# Patient Record
Sex: MALE | Race: BLACK OR AFRICAN AMERICAN | URBAN - METROPOLITAN AREA
[De-identification: names, ages, dates, MRNs, and addresses within clinical notes are randomized per-mention and may not be internally consistent; named-entity substitution may affect disease eponyms.]

---

## 2017-02-17 ENCOUNTER — DOCTOR'S OFFICE (OUTPATIENT)
Dept: URBAN - METROPOLITAN AREA CLINIC 127 | Facility: CLINIC | Age: 47
Setting detail: OPHTHALMOLOGY
End: 2017-02-17
Payer: COMMERCIAL

## 2017-02-17 ENCOUNTER — RX ONLY (RX ONLY)
Age: 47
End: 2017-02-17

## 2017-02-17 DIAGNOSIS — H50.15: ICD-10-CM

## 2017-02-17 DIAGNOSIS — E11.9: ICD-10-CM

## 2017-02-17 DIAGNOSIS — H04.123: ICD-10-CM

## 2017-02-17 DIAGNOSIS — H01.001: ICD-10-CM

## 2017-02-17 DIAGNOSIS — H40.1232: ICD-10-CM

## 2017-02-17 PROCEDURE — 65855 TRABECULOPLASTY LASER SURG: CPT | Performed by: OPHTHALMOLOGY

## 2017-02-17 PROCEDURE — 92133 CPTRZD OPH DX IMG PST SGM ON: CPT | Performed by: OPHTHALMOLOGY

## 2017-02-17 PROCEDURE — 92014 COMPRE OPH EXAM EST PT 1/>: CPT | Performed by: OPHTHALMOLOGY

## 2017-02-17 PROCEDURE — 92083 EXTENDED VISUAL FIELD XM: CPT | Performed by: OPHTHALMOLOGY

## 2017-02-17 PROCEDURE — 92020 GONIOSCOPY: CPT | Performed by: OPHTHALMOLOGY

## 2017-02-17 ASSESSMENT — REFRACTION_MANIFEST
OD_VA1: 20/
OD_VA3: 20/
OS_VA1: 20/
OU_VA: 20/
OD_VA2: 20/
OD_VA2: 20/
OS_VA1: 20/
OD_VA1: 20/
OS_VA2: 20/
OD_VA3: 20/
OS_VA1: 20/
OU_VA: 20/
OS_VA3: 20/
OU_VA: 20/
OS_VA3: 20/
OD_VA3: 20/
OS_VA2: 20/
OD_VA2: 20/
OS_VA3: 20/
OS_VA2: 20/
OD_VA1: 20/

## 2017-02-17 ASSESSMENT — SUPERFICIAL PUNCTATE KERATITIS (SPK)
OD_SPK: 1+
OS_SPK: 1+

## 2017-02-17 ASSESSMENT — REFRACTION_CURRENTRX
OS_AXIS: 93
OS_OVR_VA: 20/
OS_OVR_VA: 20/
OS_SPHERE: +1.25
OD_OVR_VA: 20/
OD_SPHERE: +0.75
OS_OVR_VA: 20/
OD_OVR_VA: 20/
OD_AXIS: 175
OD_CYLINDER: +0.75
OD_ADD: +1.50
OS_CYLINDER: +0.75
OS_ADD: +1.50
OD_OVR_VA: 20/

## 2017-02-17 ASSESSMENT — TEAR BREAK UP TIME (TBUT)
OS_TBUT: 1+
OD_TBUT: 1+

## 2017-02-17 ASSESSMENT — CONFRONTATIONAL VISUAL FIELD TEST (CVF)
OS_FINDINGS: FULL
OD_FINDINGS: FULL

## 2017-02-17 ASSESSMENT — LID EXAM ASSESSMENTS
OS_BLEPHARITIS: 3+
OD_BLEPHARITIS: 3+

## 2017-02-17 ASSESSMENT — VISUAL ACUITY
OS_BCVA: 20/30
OD_BCVA: 20/30

## 2017-04-14 ENCOUNTER — RX ONLY (RX ONLY)
Age: 47
End: 2017-04-14

## 2017-04-14 ENCOUNTER — DOCTOR'S OFFICE (OUTPATIENT)
Dept: URBAN - METROPOLITAN AREA CLINIC 127 | Facility: CLINIC | Age: 47
Setting detail: OPHTHALMOLOGY
End: 2017-04-14
Payer: COMMERCIAL

## 2017-04-14 DIAGNOSIS — E11.9: ICD-10-CM

## 2017-04-14 DIAGNOSIS — H50.15: ICD-10-CM

## 2017-04-14 DIAGNOSIS — H01.001: ICD-10-CM

## 2017-04-14 DIAGNOSIS — H40.1232: ICD-10-CM

## 2017-04-14 DIAGNOSIS — H04.123: ICD-10-CM

## 2017-04-14 PROCEDURE — 92250 FUNDUS PHOTOGRAPHY W/I&R: CPT | Performed by: OPHTHALMOLOGY

## 2017-04-14 PROCEDURE — 65855 TRABECULOPLASTY LASER SURG: CPT | Performed by: OPHTHALMOLOGY

## 2017-04-14 PROCEDURE — 92014 COMPRE OPH EXAM EST PT 1/>: CPT | Performed by: OPHTHALMOLOGY

## 2017-04-14 ASSESSMENT — REFRACTION_MANIFEST
OS_VA1: 20/
OS_VA2: 20/
OU_VA: 20/
OD_VA2: 20/
OD_VA1: 20/
OD_VA3: 20/
OU_VA: 20/
OS_VA3: 20/
OD_VA3: 20/
OS_VA1: 20/
OD_VA2: 20/
OS_VA3: 20/
OS_VA2: 20/
OD_VA1: 20/

## 2017-04-14 ASSESSMENT — REFRACTION_CURRENTRX
OD_OVR_VA: 20/
OS_ADD: +1.50
OD_SPHERE: +0.75
OS_OVR_VA: 20/
OS_OVR_VA: 20/
OS_SPHERE: +1.25
OS_AXIS: 93
OD_AXIS: 175
OS_CYLINDER: +0.75
OD_CYLINDER: +0.75
OD_OVR_VA: 20/
OS_OVR_VA: 20/
OD_OVR_VA: 20/
OD_ADD: +1.50

## 2017-04-14 ASSESSMENT — LID EXAM ASSESSMENTS
OD_BLEPHARITIS: 3+
OS_BLEPHARITIS: 3+

## 2017-04-14 ASSESSMENT — REFRACTION_OUTSIDERX
OS_CYLINDER: +0.75
OD_VA1: 20/20
OD_ADD: +2.00
OS_VA2: 20/
OU_VA: 20/
OS_SPHERE: +1.00
OS_ADD: +2.00
OD_VA3: 20/
OS_VA1: 20/25
OD_VA2: 20/
OD_SPHERE: +0.50
OD_AXIS: 180
OS_VA3: 20/
OD_CYLINDER: +0.25
OS_AXIS: 105

## 2017-04-14 ASSESSMENT — VISUAL ACUITY
OS_BCVA: 20/30
OD_BCVA: 20/30

## 2017-04-14 ASSESSMENT — SUPERFICIAL PUNCTATE KERATITIS (SPK)
OS_SPK: 1+
OD_SPK: 1+

## 2017-04-14 ASSESSMENT — TEAR BREAK UP TIME (TBUT)
OD_TBUT: 1+
OS_TBUT: 1+

## 2017-04-14 ASSESSMENT — CONFRONTATIONAL VISUAL FIELD TEST (CVF)
OS_FINDINGS: FULL
OD_FINDINGS: FULL

## 2017-08-11 ENCOUNTER — DOCTOR'S OFFICE (OUTPATIENT)
Dept: URBAN - METROPOLITAN AREA CLINIC 127 | Facility: CLINIC | Age: 47
Setting detail: OPHTHALMOLOGY
End: 2017-08-11
Payer: COMMERCIAL

## 2017-08-11 DIAGNOSIS — H01.001: ICD-10-CM

## 2017-08-11 DIAGNOSIS — H50.15: ICD-10-CM

## 2017-08-11 DIAGNOSIS — H40.1232: ICD-10-CM

## 2017-08-11 DIAGNOSIS — E11.9: ICD-10-CM

## 2017-08-11 DIAGNOSIS — D23.11: ICD-10-CM

## 2017-08-11 PROCEDURE — 67840 REMOVE EYELID LESION: CPT | Performed by: OPHTHALMOLOGY

## 2017-08-11 PROCEDURE — 92014 COMPRE OPH EXAM EST PT 1/>: CPT | Performed by: OPHTHALMOLOGY

## 2017-08-11 ASSESSMENT — CONFRONTATIONAL VISUAL FIELD TEST (CVF)
OS_FINDINGS: FULL
OD_FINDINGS: FULL

## 2017-08-11 ASSESSMENT — VISUAL ACUITY
OD_BCVA: 20/30
OS_BCVA: 20/30

## 2017-08-11 ASSESSMENT — SUPERFICIAL PUNCTATE KERATITIS (SPK)
OS_SPK: 1+
OD_SPK: 1+

## 2017-08-11 ASSESSMENT — REFRACTION_MANIFEST
OD_VA2: 20/
OS_VA3: 20/
OS_VA2: 20/
OD_VA2: 20/
OS_VA1: 20/
OU_VA: 20/
OS_VA1: 20/
OD_VA1: 20/
OD_VA1: 20/
OD_VA3: 20/
OS_VA3: 20/
OU_VA: 20/
OD_VA3: 20/
OS_VA2: 20/

## 2017-08-11 ASSESSMENT — REFRACTION_OUTSIDERX
OS_CYLINDER: +0.75
OD_SPHERE: +0.50
OS_VA1: 20/25
OD_VA1: 20/20
OU_VA: 20/
OS_AXIS: 105
OS_VA3: 20/
OS_ADD: +2.00
OD_ADD: +2.00
OD_VA2: 20/
OD_AXIS: 180
OS_SPHERE: +1.00
OD_VA3: 20/
OD_CYLINDER: +0.25
OS_VA2: 20/

## 2017-08-11 ASSESSMENT — REFRACTION_CURRENTRX
OS_OVR_VA: 20/
OS_OVR_VA: 20/
OD_SPHERE: +0.75
OD_OVR_VA: 20/
OD_AXIS: 175
OS_ADD: +1.50
OS_CYLINDER: +0.75
OD_OVR_VA: 20/
OD_OVR_VA: 20/
OS_AXIS: 93
OS_OVR_VA: 20/
OS_SPHERE: +1.25
OD_ADD: +1.50
OD_CYLINDER: +0.75

## 2017-08-11 ASSESSMENT — LID EXAM ASSESSMENTS
OS_BLEPHARITIS: 3+
OD_BLEPHARITIS: 3+

## 2017-08-11 ASSESSMENT — TEAR BREAK UP TIME (TBUT)
OD_TBUT: 1+
OS_TBUT: 1+

## 2017-12-15 ENCOUNTER — DOCTOR'S OFFICE (OUTPATIENT)
Dept: URBAN - METROPOLITAN AREA CLINIC 127 | Facility: CLINIC | Age: 47
Setting detail: OPHTHALMOLOGY
End: 2017-12-15
Payer: COMMERCIAL

## 2017-12-15 DIAGNOSIS — H04.123: ICD-10-CM

## 2017-12-15 DIAGNOSIS — H50.15: ICD-10-CM

## 2017-12-15 DIAGNOSIS — H01.001: ICD-10-CM

## 2017-12-15 DIAGNOSIS — H40.1232: ICD-10-CM

## 2017-12-15 DIAGNOSIS — E11.9: ICD-10-CM

## 2017-12-15 DIAGNOSIS — D31.11: ICD-10-CM

## 2017-12-15 PROCEDURE — 92014 COMPRE OPH EXAM EST PT 1/>: CPT | Performed by: OPHTHALMOLOGY

## 2017-12-15 PROCEDURE — 65450 TREATMENT OF CORNEAL LESION: CPT | Performed by: OPHTHALMOLOGY

## 2017-12-15 ASSESSMENT — LID EXAM ASSESSMENTS
OS_BLEPHARITIS: 3+
OD_BLEPHARITIS: 3+

## 2017-12-15 ASSESSMENT — REFRACTION_CURRENTRX
OD_CYLINDER: +0.75
OS_OVR_VA: 20/
OD_SPHERE: +0.75
OS_OVR_VA: 20/
OS_SPHERE: +1.25
OS_CYLINDER: +0.75
OD_AXIS: 175
OS_OVR_VA: 20/
OD_OVR_VA: 20/
OD_ADD: +1.50
OS_ADD: +1.50
OD_OVR_VA: 20/
OD_OVR_VA: 20/
OS_AXIS: 93

## 2017-12-15 ASSESSMENT — REFRACTION_MANIFEST
OD_SPHERE: +2.50
OD_VA1: 20/
OS_VA1: 20/
OD_VA2: 20/
OD_VA2: 20/
OS_VA3: 20/
OS_VA2: 20/
OS_VA2: 20/
OS_SPHERE: +2.50
OD_VA3: 20/
OU_VA: 20/
OS_VA1: 20/
OU_VA: 20/
OD_VA3: 20/
OS_VA3: 20/
OD_VA1: 20/

## 2017-12-15 ASSESSMENT — REFRACTION_OUTSIDERX
OD_VA1: 20/20
OD_CYLINDER: +0.25
OS_VA2: 20/
OD_AXIS: 180
OS_CYLINDER: +0.75
OS_VA1: 20/25
OS_SPHERE: +1.00
OU_VA: 20/
OD_SPHERE: +0.50
OS_VA3: 20/
OD_VA2: 20/
OD_ADD: +2.00
OS_ADD: +2.00
OD_VA3: 20/
OS_AXIS: 105

## 2017-12-15 ASSESSMENT — CONFRONTATIONAL VISUAL FIELD TEST (CVF)
OD_FINDINGS: FULL
OS_FINDINGS: FULL

## 2017-12-15 ASSESSMENT — VISUAL ACUITY
OD_BCVA: 20/20
OS_BCVA: 20/20

## 2017-12-15 ASSESSMENT — SUPERFICIAL PUNCTATE KERATITIS (SPK)
OD_SPK: 1+
OS_SPK: 1+

## 2017-12-15 ASSESSMENT — TEAR BREAK UP TIME (TBUT)
OD_TBUT: 1+
OS_TBUT: 1+

## 2018-04-13 ENCOUNTER — DOCTOR'S OFFICE (OUTPATIENT)
Dept: URBAN - METROPOLITAN AREA CLINIC 127 | Facility: CLINIC | Age: 48
Setting detail: OPHTHALMOLOGY
End: 2018-04-13
Payer: COMMERCIAL

## 2018-04-13 DIAGNOSIS — E11.9: ICD-10-CM

## 2018-04-13 DIAGNOSIS — H04.123: ICD-10-CM

## 2018-04-13 DIAGNOSIS — H50.15: ICD-10-CM

## 2018-04-13 DIAGNOSIS — H40.1232: ICD-10-CM

## 2018-04-13 DIAGNOSIS — H01.001: ICD-10-CM

## 2018-04-13 PROCEDURE — 92014 COMPRE OPH EXAM EST PT 1/>: CPT | Performed by: OPHTHALMOLOGY

## 2018-04-13 PROCEDURE — 65855 TRABECULOPLASTY LASER SURG: CPT | Performed by: OPHTHALMOLOGY

## 2018-04-13 PROCEDURE — 92133 CPTRZD OPH DX IMG PST SGM ON: CPT | Performed by: OPHTHALMOLOGY

## 2018-04-13 PROCEDURE — 92083 EXTENDED VISUAL FIELD XM: CPT | Performed by: OPHTHALMOLOGY

## 2018-04-13 ASSESSMENT — REFRACTION_OUTSIDERX
OD_CYLINDER: +0.25
OD_SPHERE: +0.50
OS_SPHERE: +1.00
OS_VA3: 20/
OS_VA2: 20/
OD_VA3: 20/
OS_VA1: 20/25
OS_AXIS: 105
OS_ADD: +2.00
OS_CYLINDER: +0.75
OU_VA: 20/
OD_VA2: 20/
OD_AXIS: 180
OD_ADD: +2.00
OD_VA1: 20/20

## 2018-04-13 ASSESSMENT — REFRACTION_CURRENTRX
OS_ADD: +1.50
OS_OVR_VA: 20/
OD_SPHERE: +0.75
OD_CYLINDER: +0.75
OS_OVR_VA: 20/
OD_OVR_VA: 20/
OD_ADD: +1.50
OD_OVR_VA: 20/
OD_OVR_VA: 20/
OD_AXIS: 175
OS_OVR_VA: 20/
OS_CYLINDER: +0.75
OS_SPHERE: +1.25
OS_AXIS: 93

## 2018-04-13 ASSESSMENT — REFRACTION_MANIFEST
OS_VA1: 20/
OD_VA3: 20/
OS_VA1: 20/
OS_VA2: 20/
OS_SPHERE: +2.50
OD_VA1: 20/
OS_VA3: 20/
OU_VA: 20/
OD_SPHERE: +2.50
OD_VA1: 20/
OU_VA: 20/
OS_VA2: 20/
OD_VA2: 20/
OS_VA3: 20/
OD_VA3: 20/
OD_VA2: 20/

## 2018-04-13 ASSESSMENT — LID EXAM ASSESSMENTS
OD_BLEPHARITIS: 2+
OS_BLEPHARITIS: 2+

## 2018-04-13 ASSESSMENT — VISUAL ACUITY
OS_BCVA: 20/20
OD_BCVA: 20/25+2

## 2018-04-13 ASSESSMENT — CONFRONTATIONAL VISUAL FIELD TEST (CVF)
OD_FINDINGS: FULL
OS_FINDINGS: FULL

## 2018-04-13 ASSESSMENT — SUPERFICIAL PUNCTATE KERATITIS (SPK)
OD_SPK: 1+
OS_SPK: 1+

## 2018-04-13 ASSESSMENT — TEAR BREAK UP TIME (TBUT)
OS_TBUT: 1+
OD_TBUT: 1+

## 2018-08-24 ENCOUNTER — DOCTOR'S OFFICE (OUTPATIENT)
Dept: URBAN - METROPOLITAN AREA CLINIC 127 | Facility: CLINIC | Age: 48
Setting detail: OPHTHALMOLOGY
End: 2018-08-24
Payer: COMMERCIAL

## 2018-08-24 DIAGNOSIS — E11.9: ICD-10-CM

## 2018-08-24 DIAGNOSIS — H40.1232: ICD-10-CM

## 2018-08-24 DIAGNOSIS — H50.15: ICD-10-CM

## 2018-08-24 DIAGNOSIS — H01.001: ICD-10-CM

## 2018-08-24 DIAGNOSIS — H04.123: ICD-10-CM

## 2018-08-24 PROCEDURE — 65855 TRABECULOPLASTY LASER SURG: CPT | Performed by: OPHTHALMOLOGY

## 2018-08-24 PROCEDURE — 92014 COMPRE OPH EXAM EST PT 1/>: CPT | Performed by: OPHTHALMOLOGY

## 2018-08-24 ASSESSMENT — REFRACTION_MANIFEST
OD_VA1: 20/
OD_VA1: 20/
OS_VA1: 20/
OD_VA2: 20/
OD_VA3: 20/
OS_VA1: 20/
OS_VA3: 20/
OS_SPHERE: +2.50
OS_VA3: 20/
OS_VA2: 20/
OD_SPHERE: +2.50
OS_VA2: 20/
OU_VA: 20/
OD_VA2: 20/
OD_VA3: 20/
OU_VA: 20/

## 2018-08-24 ASSESSMENT — REFRACTION_OUTSIDERX
OU_VA: 20/
OS_ADD: +2.00
OS_VA2: 20/
OD_CYLINDER: +0.25
OS_VA3: 20/
OD_VA3: 20/
OS_SPHERE: +1.00
OD_VA1: 20/20
OS_CYLINDER: +0.75
OD_SPHERE: +0.50
OD_VA2: 20/
OD_AXIS: 180
OD_ADD: +2.00
OS_VA1: 20/25
OS_AXIS: 105

## 2018-08-24 ASSESSMENT — REFRACTION_CURRENTRX
OD_OVR_VA: 20/
OD_OVR_VA: 20/
OS_OVR_VA: 20/
OD_SPHERE: +0.75
OS_CYLINDER: +0.75
OD_CYLINDER: +0.75
OD_ADD: +1.50
OS_OVR_VA: 20/
OS_SPHERE: +1.25
OS_ADD: +1.50
OS_AXIS: 93
OD_OVR_VA: 20/
OS_OVR_VA: 20/
OD_AXIS: 175

## 2018-08-24 ASSESSMENT — CONFRONTATIONAL VISUAL FIELD TEST (CVF)
OD_FINDINGS: FULL
OS_FINDINGS: FULL

## 2018-08-24 ASSESSMENT — SUPERFICIAL PUNCTATE KERATITIS (SPK)
OS_SPK: 1+
OD_SPK: 1+

## 2018-08-24 ASSESSMENT — LID EXAM ASSESSMENTS
OS_BLEPHARITIS: 2+
OD_BLEPHARITIS: 2+

## 2018-08-24 ASSESSMENT — TEAR BREAK UP TIME (TBUT)
OS_TBUT: 1+
OD_TBUT: 1+

## 2018-08-24 ASSESSMENT — VISUAL ACUITY
OS_BCVA: 20/20-2
OD_BCVA: 20/25+2

## 2019-02-15 ENCOUNTER — DOCTOR'S OFFICE (OUTPATIENT)
Dept: URBAN - METROPOLITAN AREA CLINIC 127 | Facility: CLINIC | Age: 49
Setting detail: OPHTHALMOLOGY
End: 2019-02-15
Payer: COMMERCIAL

## 2019-02-15 DIAGNOSIS — D23.122: ICD-10-CM

## 2019-02-15 DIAGNOSIS — H50.15: ICD-10-CM

## 2019-02-15 DIAGNOSIS — H01.001: ICD-10-CM

## 2019-02-15 DIAGNOSIS — E11.9: ICD-10-CM

## 2019-02-15 DIAGNOSIS — H04.123: ICD-10-CM

## 2019-02-15 DIAGNOSIS — H40.1232: ICD-10-CM

## 2019-02-15 PROCEDURE — 92020 GONIOSCOPY: CPT | Performed by: OPHTHALMOLOGY

## 2019-02-15 PROCEDURE — 67840 REMOVE EYELID LESION: CPT | Performed by: OPHTHALMOLOGY

## 2019-02-15 PROCEDURE — 92014 COMPRE OPH EXAM EST PT 1/>: CPT | Performed by: OPHTHALMOLOGY

## 2019-02-15 ASSESSMENT — REFRACTION_MANIFEST
OS_VA2: 20/
OS_SPHERE: +2.50
OD_CYLINDER: +0.25
OD_SPHERE: +2.50
OD_ADD: +2.00
OS_SPHERE: +1.00
OS_VA3: 20/
OS_CYLINDER: +0.75
OU_VA: 20/
OS_VA3: 20/
OS_ADD: +2.00
OD_AXIS: 180
OS_AXIS: 105
OD_VA3: 20/
OD_VA2: 20/
OS_VA2: 20/
OD_SPHERE: +0.50
OD_VA1: 20/20
OS_VA1: 20/25
OD_VA3: 20/
OD_VA1: 20/
OS_VA1: 20/
OD_VA2: 20/
OU_VA: 20/

## 2019-02-15 ASSESSMENT — REFRACTION_CURRENTRX
OD_OVR_VA: 20/
OS_OVR_VA: 20/
OD_ADD: +1.50
OS_ADD: +1.50
OD_OVR_VA: 20/
OS_OVR_VA: 20/
OS_AXIS: 93
OS_OVR_VA: 20/
OD_SPHERE: +0.75
OS_SPHERE: +1.25
OD_OVR_VA: 20/
OD_AXIS: 175
OS_CYLINDER: +0.75
OD_CYLINDER: +0.75

## 2019-02-15 ASSESSMENT — SPHEQUIV_DERIVED
OS_SPHEQUIV: 1.375
OD_SPHEQUIV: 0.625

## 2019-02-15 ASSESSMENT — SUPERFICIAL PUNCTATE KERATITIS (SPK)
OD_SPK: 1+
OS_SPK: 1+

## 2019-02-15 ASSESSMENT — LID EXAM ASSESSMENTS
OS_BLEPHARITIS: 2+
OD_BLEPHARITIS: 2+

## 2019-02-15 ASSESSMENT — TEAR BREAK UP TIME (TBUT)
OS_TBUT: 1+
OD_TBUT: 1+

## 2019-02-15 ASSESSMENT — CONFRONTATIONAL VISUAL FIELD TEST (CVF)
OS_FINDINGS: FULL
OD_FINDINGS: FULL

## 2019-02-15 ASSESSMENT — VISUAL ACUITY
OS_BCVA: 20/20
OD_BCVA: 20/25

## 2019-08-09 ENCOUNTER — DOCTOR'S OFFICE (OUTPATIENT)
Dept: URBAN - METROPOLITAN AREA CLINIC 127 | Facility: CLINIC | Age: 49
Setting detail: OPHTHALMOLOGY
End: 2019-08-09
Payer: COMMERCIAL

## 2019-08-09 DIAGNOSIS — E11.9: ICD-10-CM

## 2019-08-09 DIAGNOSIS — H50.15: ICD-10-CM

## 2019-08-09 DIAGNOSIS — H40.1232: ICD-10-CM

## 2019-08-09 DIAGNOSIS — H40.1212: ICD-10-CM

## 2019-08-09 DIAGNOSIS — H01.001: ICD-10-CM

## 2019-08-09 DIAGNOSIS — H04.123: ICD-10-CM

## 2019-08-09 PROCEDURE — 92083 EXTENDED VISUAL FIELD XM: CPT | Performed by: OPHTHALMOLOGY

## 2019-08-09 PROCEDURE — 92133 CPTRZD OPH DX IMG PST SGM ON: CPT | Performed by: OPHTHALMOLOGY

## 2019-08-09 PROCEDURE — 92014 COMPRE OPH EXAM EST PT 1/>: CPT | Performed by: OPHTHALMOLOGY

## 2019-08-09 PROCEDURE — 65855 TRABECULOPLASTY LASER SURG: CPT | Performed by: OPHTHALMOLOGY

## 2019-08-09 ASSESSMENT — SUPERFICIAL PUNCTATE KERATITIS (SPK)
OD_SPK: 1+
OS_SPK: 1+

## 2019-08-09 ASSESSMENT — REFRACTION_CURRENTRX
OS_SPHERE: +1.25
OD_OVR_VA: 20/
OD_OVR_VA: 20/
OS_CYLINDER: +0.75
OS_AXIS: 93
OS_OVR_VA: 20/
OD_CYLINDER: +0.75
OD_OVR_VA: 20/
OD_SPHERE: +0.75
OD_ADD: +1.50
OS_OVR_VA: 20/
OS_ADD: +1.50
OD_AXIS: 175
OS_OVR_VA: 20/

## 2019-08-09 ASSESSMENT — LID EXAM ASSESSMENTS
OD_BLEPHARITIS: 2+
OS_BLEPHARITIS: 2+

## 2019-08-09 ASSESSMENT — CONFRONTATIONAL VISUAL FIELD TEST (CVF)
OS_FINDINGS: FULL
OD_FINDINGS: FULL

## 2019-08-09 ASSESSMENT — REFRACTION_MANIFEST
OU_VA: 20/
OD_CYLINDER: +0.25
OD_AXIS: 165
OS_SPHERE: +1.00
OS_VA3: 20/
OD_VA1: 20/
OD_VA2: 20/
OS_VA3: 20/
OD_SPHERE: +0.75
OS_VA1: 20/
OS_AXIS: 95
OS_ADD: +2.25
OS_VA2: 20/
OS_VA1: 20/25
OD_VA3: 20/
OD_ADD: +2.25
OU_VA: 20/
OS_CYLINDER: +0.75
OS_SPHERE: +2.50
OS_VA2: 20/
OD_SPHERE: +2.50
OD_VA2: 20/
OD_VA1: 20/20
OD_VA3: 20/

## 2019-08-09 ASSESSMENT — TEAR BREAK UP TIME (TBUT)
OD_TBUT: 1+
OS_TBUT: 1+

## 2019-08-09 ASSESSMENT — SPHEQUIV_DERIVED
OD_SPHEQUIV: 0.875
OS_SPHEQUIV: 1.375

## 2019-08-09 ASSESSMENT — VISUAL ACUITY
OS_BCVA: 20/20-1
OD_BCVA: 20/25

## 2020-02-07 ENCOUNTER — DOCTOR'S OFFICE (OUTPATIENT)
Dept: URBAN - METROPOLITAN AREA CLINIC 127 | Facility: CLINIC | Age: 50
Setting detail: OPHTHALMOLOGY
End: 2020-02-07
Payer: COMMERCIAL

## 2020-02-07 DIAGNOSIS — H40.1222: ICD-10-CM

## 2020-02-07 DIAGNOSIS — E11.9: ICD-10-CM

## 2020-02-07 DIAGNOSIS — H40.1232: ICD-10-CM

## 2020-02-07 DIAGNOSIS — H50.15: ICD-10-CM

## 2020-02-07 DIAGNOSIS — H01.001: ICD-10-CM

## 2020-02-07 DIAGNOSIS — H04.123: ICD-10-CM

## 2020-02-07 PROCEDURE — 65855 TRABECULOPLASTY LASER SURG: CPT | Performed by: OPHTHALMOLOGY

## 2020-02-07 PROCEDURE — 92014 COMPRE OPH EXAM EST PT 1/>: CPT | Performed by: OPHTHALMOLOGY

## 2020-02-07 PROCEDURE — 92250 FUNDUS PHOTOGRAPHY W/I&R: CPT | Performed by: OPHTHALMOLOGY

## 2020-02-07 ASSESSMENT — CONFRONTATIONAL VISUAL FIELD TEST (CVF)
OD_FINDINGS: FULL
OS_FINDINGS: FULL

## 2020-02-07 ASSESSMENT — SUPERFICIAL PUNCTATE KERATITIS (SPK)
OD_SPK: 1+
OS_SPK: 1+

## 2020-02-07 ASSESSMENT — REFRACTION_MANIFEST
OD_SPHERE: +2.50
OD_AXIS: 165
OS_VA3: 20/
OS_VA2: 20/
OD_CYLINDER: +0.25
OS_VA1: 20/25
OS_VA3: 20/
OD_SPHERE: +0.75
OU_VA: 20/
OD_VA3: 20/
OS_SPHERE: +1.00
OS_CYLINDER: +0.75
OS_VA1: 20/
OD_VA2: 20/
OU_VA: 20/
OS_ADD: +2.25
OD_VA3: 20/
OD_ADD: +2.25
OS_AXIS: 95
OD_VA1: 20/
OS_VA2: 20/
OD_VA2: 20/
OS_SPHERE: +2.50
OD_VA1: 20/20

## 2020-02-07 ASSESSMENT — REFRACTION_CURRENTRX
OS_AXIS: 93
OD_AXIS: 175
OS_CYLINDER: +0.75
OD_CYLINDER: +0.75
OD_ADD: +1.50
OD_SPHERE: +0.75
OS_ADD: +1.50
OS_SPHERE: +1.25
OD_OVR_VA: 20/
OS_OVR_VA: 20/

## 2020-02-07 ASSESSMENT — LID EXAM ASSESSMENTS
OS_BLEPHARITIS: 2+
OD_BLEPHARITIS: 2+

## 2020-02-07 ASSESSMENT — VISUAL ACUITY
OD_BCVA: 20/25
OS_BCVA: 20/25

## 2020-02-07 ASSESSMENT — SPHEQUIV_DERIVED
OS_SPHEQUIV: 1.375
OD_SPHEQUIV: 0.875

## 2020-02-07 ASSESSMENT — TEAR BREAK UP TIME (TBUT)
OS_TBUT: 1+
OD_TBUT: 1+

## 2020-08-14 ENCOUNTER — DOCTOR'S OFFICE (OUTPATIENT)
Dept: URBAN - METROPOLITAN AREA CLINIC 127 | Facility: CLINIC | Age: 50
Setting detail: OPHTHALMOLOGY
End: 2020-08-14
Payer: COMMERCIAL

## 2020-08-14 DIAGNOSIS — E11.9: ICD-10-CM

## 2020-08-14 DIAGNOSIS — H40.1212: ICD-10-CM

## 2020-08-14 DIAGNOSIS — H01.001: ICD-10-CM

## 2020-08-14 DIAGNOSIS — H40.1232: ICD-10-CM

## 2020-08-14 DIAGNOSIS — H50.15: ICD-10-CM

## 2020-08-14 DIAGNOSIS — H04.123: ICD-10-CM

## 2020-08-14 PROCEDURE — 92014 COMPRE OPH EXAM EST PT 1/>: CPT | Performed by: OPHTHALMOLOGY

## 2020-08-14 PROCEDURE — 92133 CPTRZD OPH DX IMG PST SGM ON: CPT | Performed by: OPHTHALMOLOGY

## 2020-08-14 PROCEDURE — 65855 TRABECULOPLASTY LASER SURG: CPT | Performed by: OPHTHALMOLOGY

## 2020-08-14 PROCEDURE — 92083 EXTENDED VISUAL FIELD XM: CPT | Performed by: OPHTHALMOLOGY

## 2020-08-14 ASSESSMENT — TEAR BREAK UP TIME (TBUT)
OS_TBUT: 1+
OD_TBUT: 1+

## 2020-08-14 ASSESSMENT — REFRACTION_MANIFEST
OD_SPHERE: +0.75
OD_VA1: 20/20
OS_AXIS: 95
OD_CYLINDER: +0.25
OS_VA1: 20/25
OD_SPHERE: +2.50
OS_CYLINDER: +0.75
OD_AXIS: 165
OD_ADD: +2.25
OS_ADD: +2.25
OS_SPHERE: +2.50
OS_SPHERE: +1.00

## 2020-08-14 ASSESSMENT — REFRACTION_CURRENTRX
OS_OVR_VA: 20/
OD_AXIS: 175
OD_SPHERE: +0.75
OS_AXIS: 93
OS_CYLINDER: +0.75
OD_CYLINDER: +0.75
OS_ADD: +1.50
OD_OVR_VA: 20/
OS_SPHERE: +1.25
OD_ADD: +1.50

## 2020-08-14 ASSESSMENT — VISUAL ACUITY
OS_BCVA: 20/20
OD_BCVA: 20/25-1

## 2020-08-14 ASSESSMENT — SUPERFICIAL PUNCTATE KERATITIS (SPK)
OS_SPK: 1+
OD_SPK: 1+

## 2020-08-14 ASSESSMENT — SPHEQUIV_DERIVED
OS_SPHEQUIV: 1.375
OD_SPHEQUIV: 0.875

## 2020-08-14 ASSESSMENT — CONFRONTATIONAL VISUAL FIELD TEST (CVF)
OS_FINDINGS: FULL
OD_FINDINGS: FULL

## 2020-08-14 ASSESSMENT — LID EXAM ASSESSMENTS
OS_BLEPHARITIS: 2+
OD_BLEPHARITIS: 2+

## 2021-02-19 ENCOUNTER — DOCTOR'S OFFICE (OUTPATIENT)
Dept: URBAN - METROPOLITAN AREA CLINIC 127 | Facility: CLINIC | Age: 51
Setting detail: OPHTHALMOLOGY
End: 2021-02-19
Payer: COMMERCIAL

## 2021-02-19 DIAGNOSIS — H35.373: ICD-10-CM

## 2021-02-19 DIAGNOSIS — H40.1222: ICD-10-CM

## 2021-02-19 DIAGNOSIS — H40.1232: ICD-10-CM

## 2021-02-19 DIAGNOSIS — H04.123: ICD-10-CM

## 2021-02-19 DIAGNOSIS — E11.9: ICD-10-CM

## 2021-02-19 DIAGNOSIS — H50.15: ICD-10-CM

## 2021-02-19 DIAGNOSIS — H01.001: ICD-10-CM

## 2021-02-19 PROCEDURE — 65855 TRABECULOPLASTY LASER SURG: CPT | Performed by: OPHTHALMOLOGY

## 2021-02-19 PROCEDURE — 92134 CPTRZ OPH DX IMG PST SGM RTA: CPT | Performed by: OPHTHALMOLOGY

## 2021-02-19 PROCEDURE — 92133 CPTRZD OPH DX IMG PST SGM ON: CPT | Performed by: OPHTHALMOLOGY

## 2021-02-19 PROCEDURE — 92014 COMPRE OPH EXAM EST PT 1/>: CPT | Performed by: OPHTHALMOLOGY

## 2021-02-19 ASSESSMENT — REFRACTION_CURRENTRX
OS_AXIS: 93
OD_SPHERE: +0.75
OS_CYLINDER: +0.75
OD_AXIS: 175
OS_ADD: +1.50
OS_OVR_VA: 20/
OD_OVR_VA: 20/
OD_ADD: +1.50
OD_CYLINDER: +0.75
OS_SPHERE: +1.25

## 2021-02-19 ASSESSMENT — REFRACTION_MANIFEST
OS_VA1: 20/25
OS_AXIS: 95
OD_SPHERE: +2.50
OS_SPHERE: +2.50
OD_VA1: 20/20
OD_SPHERE: +0.75
OD_AXIS: 165
OD_CYLINDER: +0.25
OS_ADD: +2.25
OD_ADD: +2.25
OS_SPHERE: +1.00
OS_CYLINDER: +0.75

## 2021-02-19 ASSESSMENT — PACHYMETRY
OD_CT_CORRECTION: 4
OD_CT_UM: 490
OS_CT_CORRECTION: 4
OS_CT_UM: 491

## 2021-02-19 ASSESSMENT — CONFRONTATIONAL VISUAL FIELD TEST (CVF)
OS_FINDINGS: FULL
OD_FINDINGS: FULL

## 2021-02-19 ASSESSMENT — SPHEQUIV_DERIVED
OS_SPHEQUIV: 1.375
OD_SPHEQUIV: 0.875

## 2021-02-19 ASSESSMENT — VISUAL ACUITY
OS_BCVA: 20/25
OD_BCVA: 20/30+2

## 2021-02-19 ASSESSMENT — TONOMETRY
OS_IOP_MMHG: 21
OD_IOP_MMHG: 19

## 2021-02-19 ASSESSMENT — LID EXAM ASSESSMENTS
OS_BLEPHARITIS: 2+
OD_BLEPHARITIS: 2+

## 2021-02-19 ASSESSMENT — SUPERFICIAL PUNCTATE KERATITIS (SPK)
OS_SPK: 1+
OD_SPK: 1+

## 2021-02-19 ASSESSMENT — TEAR BREAK UP TIME (TBUT)
OD_TBUT: 1+
OS_TBUT: 1+

## 2021-08-06 ENCOUNTER — HOSPITAL ENCOUNTER (OUTPATIENT)
Facility: HOSPITAL | Age: 51
Setting detail: OBSERVATION
Discharge: HOME | End: 2021-08-10
Attending: EMERGENCY MEDICINE | Admitting: STUDENT IN AN ORGANIZED HEALTH CARE EDUCATION/TRAINING PROGRAM
Payer: COMMERCIAL

## 2021-08-06 ENCOUNTER — APPOINTMENT (OUTPATIENT)
Dept: RADIOLOGY | Facility: HOSPITAL | Age: 51
Setting detail: OBSERVATION
End: 2021-08-06
Attending: EMERGENCY MEDICINE
Payer: COMMERCIAL

## 2021-08-06 ENCOUNTER — APPOINTMENT (OUTPATIENT)
Dept: RADIOLOGY | Facility: HOSPITAL | Age: 51
Setting detail: OBSERVATION
End: 2021-08-06
Attending: STUDENT IN AN ORGANIZED HEALTH CARE EDUCATION/TRAINING PROGRAM
Payer: COMMERCIAL

## 2021-08-06 ENCOUNTER — APPOINTMENT (OUTPATIENT)
Dept: RADIOLOGY | Facility: HOSPITAL | Age: 51
Setting detail: OBSERVATION
End: 2021-08-06
Attending: PSYCHIATRY & NEUROLOGY
Payer: COMMERCIAL

## 2021-08-06 ENCOUNTER — APPOINTMENT (EMERGENCY)
Dept: RADIOLOGY | Facility: HOSPITAL | Age: 51
End: 2021-08-06
Attending: EMERGENCY MEDICINE
Payer: COMMERCIAL

## 2021-08-06 ENCOUNTER — APPOINTMENT (OUTPATIENT)
Dept: NEUROLOGY | Facility: HOSPITAL | Age: 51
Setting detail: OBSERVATION
End: 2021-08-06
Attending: PSYCHIATRY & NEUROLOGY
Payer: COMMERCIAL

## 2021-08-06 DIAGNOSIS — K56.2 SIGMOID VOLVULUS (CMS/HCC): ICD-10-CM

## 2021-08-06 DIAGNOSIS — R00.0 TACHYCARDIA: ICD-10-CM

## 2021-08-06 DIAGNOSIS — R41.82 ALTERED MENTAL STATUS, UNSPECIFIED ALTERED MENTAL STATUS TYPE: Primary | ICD-10-CM

## 2021-08-06 PROBLEM — I10 ESSENTIAL HYPERTENSION: Status: ACTIVE | Noted: 2021-08-06

## 2021-08-06 PROBLEM — G93.40 ENCEPHALOPATHY: Status: ACTIVE | Noted: 2021-08-06

## 2021-08-06 PROBLEM — E11.9 TYPE 2 DIABETES MELLITUS WITHOUT COMPLICATION, WITHOUT LONG-TERM CURRENT USE OF INSULIN (CMS/HCC): Status: ACTIVE | Noted: 2021-08-06

## 2021-08-06 PROBLEM — H40.9 GLAUCOMA: Status: ACTIVE | Noted: 2021-08-06

## 2021-08-06 LAB
ALBUMIN SERPL-MCNC: 4 G/DL (ref 3.4–5)
ALP SERPL-CCNC: 58 IU/L (ref 35–126)
ALT SERPL-CCNC: 16 IU/L (ref 16–63)
AMPHET UR QL SCN: NOT DETECTED
ANION GAP SERPL CALC-SCNC: 10 MEQ/L (ref 3–15)
AST SERPL-CCNC: 17 IU/L (ref 15–41)
BACTERIA URNS QL MICRO: ABNORMAL /HPF
BARBITURATES UR QL SCN: NOT DETECTED
BASOPHILS # BLD: 0.03 K/UL (ref 0.01–0.1)
BASOPHILS NFR BLD: 0.5 %
BENZODIAZ UR QL SCN: NOT DETECTED
BILIRUB SERPL-MCNC: 0.5 MG/DL (ref 0.3–1.2)
BILIRUB UR QL STRIP.AUTO: 1 MG/DL
BUN SERPL-MCNC: 12 MG/DL (ref 8–20)
CALCIUM SERPL-MCNC: 10 MG/DL (ref 8.9–10.3)
CANNABINOIDS UR QL SCN: NOT DETECTED
CHLORIDE SERPL-SCNC: 102 MEQ/L (ref 98–109)
CK SERPL-CCNC: 61 U/L (ref 16–300)
CLARITY UR REFRACT.AUTO: CLEAR
CO2 SERPL-SCNC: 28 MEQ/L (ref 22–32)
COCAINE UR QL SCN: NOT DETECTED
COLOR UR AUTO: YELLOW
CREAT SERPL-MCNC: 1.2 MG/DL (ref 0.8–1.3)
CRP SERPL-MCNC: <6 MG/L
DIFFERENTIAL METHOD BLD: ABNORMAL
EOSINOPHIL # BLD: 0.12 K/UL (ref 0.04–0.54)
EOSINOPHIL NFR BLD: 2.2 %
ERYTHROCYTE [DISTWIDTH] IN BLOOD BY AUTOMATED COUNT: 12.5 % (ref 11.6–14.4)
EST. AVERAGE GLUCOSE BLD GHB EST-MCNC: 177 MG/DL
FERRITIN SERPL-MCNC: 21 NG/ML (ref 24–250)
FOLATE SERPL-MCNC: >20 NG/ML
GFR SERPL CREATININE-BSD FRML MDRD: >60 ML/MIN/1.73M*2
GLUCOSE BLD-MCNC: 101 MG/DL (ref 70–99)
GLUCOSE BLD-MCNC: 141 MG/DL (ref 70–99)
GLUCOSE BLD-MCNC: 168 MG/DL (ref 70–99)
GLUCOSE BLD-MCNC: 176 MG/DL (ref 70–99)
GLUCOSE SERPL-MCNC: 177 MG/DL (ref 70–99)
GLUCOSE UR STRIP.AUTO-MCNC: NEGATIVE MG/DL
HBA1C MFR BLD HPLC: 7.8 %
HCT VFR BLDCO AUTO: 46 % (ref 40.1–51)
HGB BLD-MCNC: 14.5 G/DL (ref 13.7–17.5)
HGB UR QL STRIP.AUTO: NEGATIVE
HYALINE CASTS #/AREA URNS LPF: ABNORMAL /LPF
IMM GRANULOCYTES # BLD AUTO: 0.01 K/UL (ref 0–0.08)
IMM GRANULOCYTES NFR BLD AUTO: 0.2 %
IRON SATN MFR SERPL: 29 % (ref 15–45)
IRON SERPL-MCNC: 116 UG/DL (ref 35–150)
KETONES UR STRIP.AUTO-MCNC: 2 MG/DL
LACTATE SERPL-SCNC: 1.6 MMOL/L (ref 0.4–2)
LACTATE SERPL-SCNC: 2.2 MMOL/L (ref 0.4–2)
LEUKOCYTE ESTERASE UR QL STRIP.AUTO: NEGATIVE
LYMPHOCYTES # BLD: 1.38 K/UL (ref 1.2–3.5)
LYMPHOCYTES NFR BLD: 25.1 %
MAGNESIUM SERPL-MCNC: 1.5 MG/DL (ref 1.8–2.5)
MCH RBC QN AUTO: 28 PG (ref 28–33.2)
MCHC RBC AUTO-ENTMCNC: 31.5 G/DL (ref 32.2–36.5)
MCV RBC AUTO: 88.8 FL (ref 83–98)
MONOCYTES # BLD: 0.36 K/UL (ref 0.3–1)
MONOCYTES NFR BLD: 6.5 %
MUCOUS THREADS URNS QL MICRO: 1 /LPF
NEUTROPHILS # BLD: 3.6 K/UL (ref 1.7–7)
NEUTS SEG NFR BLD: 65.5 %
NITRITE UR QL STRIP.AUTO: NEGATIVE
NRBC BLD-RTO: 0 %
OPIATES UR QL SCN: NOT DETECTED
PCP UR QL SCN: NOT DETECTED
PDW BLD AUTO: 10.2 FL (ref 9.4–12.4)
PH UR STRIP.AUTO: 6 [PH]
PLATELET # BLD AUTO: 220 K/UL (ref 150–350)
POCT TEST: ABNORMAL
POTASSIUM SERPL-SCNC: 4.4 MEQ/L (ref 3.6–5.1)
PROT SERPL-MCNC: 6.7 G/DL (ref 6–8.2)
PROT UR QL STRIP.AUTO: NEGATIVE
RBC # BLD AUTO: 5.18 M/UL (ref 4.5–5.8)
RBC #/AREA URNS HPF: ABNORMAL /HPF
SARS-COV-2 RNA RESP QL NAA+PROBE: NEGATIVE
SODIUM SERPL-SCNC: 140 MEQ/L (ref 136–144)
SP GR UR REFRACT.AUTO: 1.02
SQUAMOUS URNS QL MICRO: 1 /HPF
TIBC SERPL-MCNC: 395 UG/DL (ref 270–460)
TROPONIN I SERPL-MCNC: <0.03 NG/ML
TSH SERPL DL<=0.05 MIU/L-ACNC: 1.57 MIU/L (ref 0.34–5.6)
UIBC SERPL-MCNC: 279 UG/DL (ref 180–360)
UROBILINOGEN UR STRIP-ACNC: 1 EU/DL
VIT B12 SERPL-MCNC: 184 PG/ML (ref 180–914)
WBC # BLD AUTO: 5.5 K/UL (ref 3.8–10.5)
WBC #/AREA URNS HPF: ABNORMAL /HPF

## 2021-08-06 PROCEDURE — 71045 X-RAY EXAM CHEST 1 VIEW: CPT

## 2021-08-06 PROCEDURE — 83036 HEMOGLOBIN GLYCOSYLATED A1C: CPT | Performed by: STUDENT IN AN ORGANIZED HEALTH CARE EDUCATION/TRAINING PROGRAM

## 2021-08-06 PROCEDURE — 74177 CT ABD & PELVIS W/CONTRAST: CPT | Mod: MG

## 2021-08-06 PROCEDURE — 83605 ASSAY OF LACTIC ACID: CPT | Performed by: EMERGENCY MEDICINE

## 2021-08-06 PROCEDURE — 96361 HYDRATE IV INFUSION ADD-ON: CPT

## 2021-08-06 PROCEDURE — 99285 EMERGENCY DEPT VISIT HI MDM: CPT | Mod: 25

## 2021-08-06 PROCEDURE — 63700000 HC SELF-ADMINISTRABLE DRUG: Performed by: STUDENT IN AN ORGANIZED HEALTH CARE EDUCATION/TRAINING PROGRAM

## 2021-08-06 PROCEDURE — G0378 HOSPITAL OBSERVATION PER HR: HCPCS

## 2021-08-06 PROCEDURE — 80307 DRUG TEST PRSMV CHEM ANLYZR: CPT | Performed by: EMERGENCY MEDICINE

## 2021-08-06 PROCEDURE — G1004 CDSM NDSC: HCPCS

## 2021-08-06 PROCEDURE — 85025 COMPLETE CBC W/AUTO DIFF WBC: CPT | Performed by: EMERGENCY MEDICINE

## 2021-08-06 PROCEDURE — 84443 ASSAY THYROID STIM HORMONE: CPT | Performed by: EMERGENCY MEDICINE

## 2021-08-06 PROCEDURE — 96365 THER/PROPH/DIAG IV INF INIT: CPT | Mod: 59

## 2021-08-06 PROCEDURE — 3E0337Z INTRODUCTION OF ELECTROLYTIC AND WATER BALANCE SUBSTANCE INTO PERIPHERAL VEIN, PERCUTANEOUS APPROACH: ICD-10-PCS | Performed by: EMERGENCY MEDICINE

## 2021-08-06 PROCEDURE — 63600105 HC IODINE BASED CONTRAST: Mod: JW | Performed by: EMERGENCY MEDICINE

## 2021-08-06 PROCEDURE — 25800000 HC PHARMACY IV SOLUTIONS: Performed by: STUDENT IN AN ORGANIZED HEALTH CARE EDUCATION/TRAINING PROGRAM

## 2021-08-06 PROCEDURE — 80053 COMPREHEN METABOLIC PANEL: CPT | Performed by: EMERGENCY MEDICINE

## 2021-08-06 PROCEDURE — 95816 EEG AWAKE AND DROWSY: CPT | Mod: 26 | Performed by: PSYCHIATRY & NEUROLOGY

## 2021-08-06 PROCEDURE — 95816 EEG AWAKE AND DROWSY: CPT

## 2021-08-06 PROCEDURE — 82746 ASSAY OF FOLIC ACID SERUM: CPT | Performed by: PSYCHIATRY & NEUROLOGY

## 2021-08-06 PROCEDURE — 82550 ASSAY OF CK (CPK): CPT | Performed by: HOSPITALIST

## 2021-08-06 PROCEDURE — U0002 COVID-19 LAB TEST NON-CDC: HCPCS | Performed by: EMERGENCY MEDICINE

## 2021-08-06 PROCEDURE — 74018 RADEX ABDOMEN 1 VIEW: CPT

## 2021-08-06 PROCEDURE — 36415 COLL VENOUS BLD VENIPUNCTURE: CPT | Performed by: EMERGENCY MEDICINE

## 2021-08-06 PROCEDURE — 86140 C-REACTIVE PROTEIN: CPT | Performed by: PSYCHIATRY & NEUROLOGY

## 2021-08-06 PROCEDURE — 96365 THER/PROPH/DIAG IV INF INIT: CPT | Performed by: INTERNAL MEDICINE

## 2021-08-06 PROCEDURE — 84484 ASSAY OF TROPONIN QUANT: CPT | Performed by: EMERGENCY MEDICINE

## 2021-08-06 PROCEDURE — 96366 THER/PROPH/DIAG IV INF ADDON: CPT

## 2021-08-06 PROCEDURE — 83735 ASSAY OF MAGNESIUM: CPT | Performed by: EMERGENCY MEDICINE

## 2021-08-06 PROCEDURE — 82728 ASSAY OF FERRITIN: CPT | Performed by: HOSPITALIST

## 2021-08-06 PROCEDURE — 93005 ELECTROCARDIOGRAM TRACING: CPT | Performed by: EMERGENCY MEDICINE

## 2021-08-06 PROCEDURE — 99220 PR INITIAL OBSERVATION CARE/DAY 70 MINUTES: CPT | Performed by: STUDENT IN AN ORGANIZED HEALTH CARE EDUCATION/TRAINING PROGRAM

## 2021-08-06 PROCEDURE — 96366 THER/PROPH/DIAG IV INF ADDON: CPT | Performed by: INTERNAL MEDICINE

## 2021-08-06 PROCEDURE — A9585 GADOBUTROL INJECTION: HCPCS | Performed by: PSYCHIATRY & NEUROLOGY

## 2021-08-06 PROCEDURE — 81001 URINALYSIS AUTO W/SCOPE: CPT | Mod: 59 | Performed by: EMERGENCY MEDICINE

## 2021-08-06 PROCEDURE — 3E033GC INTRODUCTION OF OTHER THERAPEUTIC SUBSTANCE INTO PERIPHERAL VEIN, PERCUTANEOUS APPROACH: ICD-10-PCS | Performed by: EMERGENCY MEDICINE

## 2021-08-06 PROCEDURE — 63600000 HC DRUGS/DETAIL CODE: Performed by: STUDENT IN AN ORGANIZED HEALTH CARE EDUCATION/TRAINING PROGRAM

## 2021-08-06 PROCEDURE — 83550 IRON BINDING TEST: CPT | Performed by: HOSPITALIST

## 2021-08-06 PROCEDURE — 70553 MRI BRAIN STEM W/O & W/DYE: CPT | Mod: MG

## 2021-08-06 PROCEDURE — 99205 OFFICE O/P NEW HI 60 MIN: CPT | Performed by: PSYCHIATRY & NEUROLOGY

## 2021-08-06 PROCEDURE — 25800000 HC PHARMACY IV SOLUTIONS: Performed by: EMERGENCY MEDICINE

## 2021-08-06 PROCEDURE — 96361 HYDRATE IV INFUSION ADD-ON: CPT | Performed by: INTERNAL MEDICINE

## 2021-08-06 PROCEDURE — 82607 VITAMIN B-12: CPT | Performed by: PSYCHIATRY & NEUROLOGY

## 2021-08-06 RX ORDER — IBUPROFEN 200 MG
16-32 TABLET ORAL AS NEEDED
Status: DISCONTINUED | OUTPATIENT
Start: 2021-08-06 | End: 2021-08-08 | Stop reason: SDUPTHER

## 2021-08-06 RX ORDER — DEXTROSE 40 %
15-30 GEL (GRAM) ORAL AS NEEDED
Status: DISCONTINUED | OUTPATIENT
Start: 2021-08-06 | End: 2021-08-08 | Stop reason: SDUPTHER

## 2021-08-06 RX ORDER — ATORVASTATIN CALCIUM 10 MG/1
10 TABLET, FILM COATED ORAL DAILY
Status: DISCONTINUED | OUTPATIENT
Start: 2021-08-06 | End: 2021-08-10 | Stop reason: HOSPADM

## 2021-08-06 RX ORDER — NIFEDIPINE 30 MG/1
30 TABLET, FILM COATED, EXTENDED RELEASE ORAL DAILY
Status: DISCONTINUED | OUTPATIENT
Start: 2021-08-06 | End: 2021-08-10 | Stop reason: HOSPADM

## 2021-08-06 RX ORDER — TIMOLOL MALEATE 5 MG/ML
1 SOLUTION/ DROPS OPHTHALMIC DAILY
COMMUNITY
Start: 2021-07-01

## 2021-08-06 RX ORDER — GADOBUTROL 604.72 MG/ML
0.1 INJECTION INTRAVENOUS ONCE
Status: COMPLETED | OUTPATIENT
Start: 2021-08-06 | End: 2021-08-06

## 2021-08-06 RX ORDER — CYANOCOBALAMIN 1000 UG/ML
1000 INJECTION, SOLUTION INTRAMUSCULAR; SUBCUTANEOUS ONCE
Status: COMPLETED | OUTPATIENT
Start: 2021-08-07 | End: 2021-08-07

## 2021-08-06 RX ORDER — SIMVASTATIN 20 MG/1
20 TABLET, FILM COATED ORAL NIGHTLY
COMMUNITY
Start: 2021-06-15 | End: 2021-12-27 | Stop reason: SDUPTHER

## 2021-08-06 RX ORDER — LATANOPROST 50 UG/ML
1 SOLUTION/ DROPS OPHTHALMIC NIGHTLY
COMMUNITY
Start: 2021-05-05

## 2021-08-06 RX ORDER — LANOLIN ALCOHOL/MO/W.PET/CERES
400 CREAM (GRAM) TOPICAL 2 TIMES DAILY
Status: DISCONTINUED | OUTPATIENT
Start: 2021-08-06 | End: 2021-08-10 | Stop reason: HOSPADM

## 2021-08-06 RX ORDER — ACETAMINOPHEN 500 MG
2 TABLET ORAL DAILY
COMMUNITY

## 2021-08-06 RX ORDER — LATANOPROST 50 UG/ML
1 SOLUTION/ DROPS OPHTHALMIC NIGHTLY
Status: DISCONTINUED | OUTPATIENT
Start: 2021-08-06 | End: 2021-08-10 | Stop reason: HOSPADM

## 2021-08-06 RX ORDER — METFORMIN HYDROCHLORIDE 1000 MG/1
1000 TABLET ORAL
COMMUNITY
Start: 2021-05-15 | End: 2022-01-17 | Stop reason: SDUPTHER

## 2021-08-06 RX ORDER — GLIPIZIDE 5 MG/1
10 TABLET, FILM COATED, EXTENDED RELEASE ORAL 2 TIMES DAILY WITH MEALS
COMMUNITY
Start: 2021-06-15 | End: 2021-11-12 | Stop reason: ENTERED-IN-ERROR

## 2021-08-06 RX ORDER — TIMOLOL MALEATE 5 MG/ML
1 SOLUTION/ DROPS OPHTHALMIC DAILY
Status: DISCONTINUED | OUTPATIENT
Start: 2021-08-06 | End: 2021-08-10 | Stop reason: HOSPADM

## 2021-08-06 RX ORDER — INSULIN ASPART 100 [IU]/ML
3-5 INJECTION, SOLUTION INTRAVENOUS; SUBCUTANEOUS
Status: DISCONTINUED | OUTPATIENT
Start: 2021-08-06 | End: 2021-08-08

## 2021-08-06 RX ORDER — POLYETHYLENE GLYCOL 3350 17 G/17G
17 POWDER, FOR SOLUTION ORAL DAILY
Status: DISCONTINUED | OUTPATIENT
Start: 2021-08-06 | End: 2021-08-08

## 2021-08-06 RX ORDER — IBUPROFEN/PSEUDOEPHEDRINE HCL 200MG-30MG
3 TABLET ORAL NIGHTLY
Status: DISCONTINUED | OUTPATIENT
Start: 2021-08-06 | End: 2021-08-10 | Stop reason: HOSPADM

## 2021-08-06 RX ORDER — ACETAMINOPHEN 325 MG/1
650 TABLET ORAL EVERY 4 HOURS PRN
Status: DISCONTINUED | OUTPATIENT
Start: 2021-08-06 | End: 2021-08-10 | Stop reason: HOSPADM

## 2021-08-06 RX ORDER — DEXTROSE 50 % IN WATER (D50W) INTRAVENOUS SYRINGE
25 AS NEEDED
Status: DISCONTINUED | OUTPATIENT
Start: 2021-08-06 | End: 2021-08-08 | Stop reason: SDUPTHER

## 2021-08-06 RX ADMIN — MAGNESIUM OXIDE TAB 400 MG (241.3 MG ELEMENTAL MG) 400 MG: 400 (241.3 MG) TAB at 21:12

## 2021-08-06 RX ADMIN — MAGNESIUM SULFATE HEPTAHYDRATE 2 G: 40 INJECTION, SOLUTION INTRAVENOUS at 16:26

## 2021-08-06 RX ADMIN — LATANOPROST 1 DROP: 50 SOLUTION/ DROPS OPHTHALMIC at 21:45

## 2021-08-06 RX ADMIN — SODIUM CHLORIDE 1000 ML: 900 INJECTION, SOLUTION INTRAVENOUS at 18:37

## 2021-08-06 RX ADMIN — SODIUM CHLORIDE 1000 ML: 9 INJECTION, SOLUTION INTRAVENOUS at 11:08

## 2021-08-06 RX ADMIN — GADOBUTROL 7 MMOL: 604.72 INJECTION INTRAVENOUS at 20:33

## 2021-08-06 RX ADMIN — IOHEXOL 80 ML: 350 INJECTION, SOLUTION INTRAVENOUS at 12:52

## 2021-08-06 RX ADMIN — NIFEDIPINE 30 MG: 30 TABLET, FILM COATED, EXTENDED RELEASE ORAL at 21:12

## 2021-08-06 RX ADMIN — Medication 3 MG: at 21:12

## 2021-08-06 RX ADMIN — POLYETHYLENE GLYCOL 3350 17 G: 17 POWDER, FOR SOLUTION ORAL at 21:12

## 2021-08-06 RX ADMIN — ATORVASTATIN CALCIUM 10 MG: 10 TABLET, FILM COATED ORAL at 16:27

## 2021-08-06 ASSESSMENT — COGNITIVE AND FUNCTIONAL STATUS - GENERAL
HELP NEEDED FOR PERSONAL GROOMING: 4 - NONE
DRESSING REGULAR LOWER BODY CLOTHING: 4 - NONE
EATING MEALS: 4 - NONE
WALKING IN HOSPITAL ROOM: 4 - NONE
HELP NEEDED FOR BATHING: 4 - NONE
TOILETING: 4 - NONE
STANDING UP FROM CHAIR USING ARMS: 4 - NONE
DRESSING REGULAR UPPER BODY CLOTHING: 4 - NONE
CLIMB 3 TO 5 STEPS WITH RAILING: 4 - NONE
MOVING TO AND FROM BED TO CHAIR: 4 - NONE

## 2021-08-06 ASSESSMENT — ENCOUNTER SYMPTOMS
DECREASED APPETITE: 1
ABDOMINAL PAIN: 0
CHILLS: 0
FREQUENCY: 0
CHEST TIGHTNESS: 0
CONFUSION: 1
VISUAL CHANGE: 0
DYSURIA: 0
ALTERED MENTAL STATUS: 1
HEADACHES: 0
EYE DEVIATION: 0
NAUSEA: 0
SORE THROAT: 0
WEAKNESS: 0
FEVER: 0
RHINORRHEA: 0
PALPITATIONS: 0
VOMITING: 0
LIGHT-HEADEDNESS: 0
SLURRED SPEECH: 1
SPEECH DIFFICULTY: 1
SHORTNESS OF BREATH: 0

## 2021-08-06 ASSESSMENT — PATIENT HEALTH QUESTIONNAIRE - PHQ9: SUM OF ALL RESPONSES TO PHQ9 QUESTIONS 1 & 2: 0

## 2021-08-06 NOTE — ASSESSMENT & PLAN NOTE
- Intial CT with possible volvulus. However, low concern per GI. Pt with only mild abdominal pain. +BM  - GI following - Flex/sig 8/10 was normal  - Ok to resume regular diet.
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
## 2021-08-06 NOTE — ASSESSMENT & PLAN NOTE
A1c 7.8  Home regimen is metformin and glipizide - hold as inpatient and restart at time of discharge   ISS while inpatient
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
## 2021-08-06 NOTE — PROGRESS NOTES
Spoke with patient and confirmed with medication list from [facility] and/or patient's own pharmacy to complete the medication reconciliation.     Prior to admission medication list:    Current Outpatient Medications:   •  Lactobacillus acidophilus (PROBIOTIC) 10 billion cell capsule, Take 2 capsules by mouth daily., Disp: , Rfl:   •  glipiZIDE (GLUCOTROL XL) 5 mg 24 hr tablet, Take 10 mg by mouth 2 (two) times a day with meals.  , Disp: , Rfl:   •  latanoprost (XALATAN) 0.005 % ophthalmic solution, Administer 1 drop into both eyes nightly., Disp: , Rfl:   •  metFORMIN (GLUCOPHAGE) 1,000 mg tablet, Take 1,000 mg by mouth 2 (two) times a day., Disp: , Rfl:   •  simvastatin (ZOCOR) 20 mg tablet, Take 20 mg by mouth nightly.  , Disp: , Rfl:   •  timolol (TIMOPTIC) 0.5 % ophthalmic solution, Administer 1 drop into both eyes daily.  , Disp: , Rfl:         Compliance:   No compliance concerns

## 2021-08-06 NOTE — ASSESSMENT & PLAN NOTE
51M with NIDDM2, former smoker (1ppd x4yrs, quit age 19), HLD, glaucoma, [place] descent, GERD not on long-acting acid suppression, who presents with three weeks of worsening confusion/memory/speech issues, early satiety/abd bloating, in the setting of 20lbs unintentional WL and intermittent (2x/wk) night sweats over 6 months, found to have a markedly redundant relatively distended sigmoid colon with some twisting of the mesentery on CT concerning for sigmoid volvulus.    # Possible sigmoid volvulus on CT imaging (+mesenteric swirling)  # Relative obstipation + constipation, now self-resolved  # Heme-negative, semi-liquid yellow stool  # Abnormal imaging of GI tract   # Unintentional 20lbs WL (6mo), intermittent nondrenching night sweats (2x/wk), anorexia, early satiety, abd bloating, generalized/suprapubic pain  # Lactic acidosis, resolved with IVF    Impression: Overall he auto-decompressed himself with a markedly improved exam following his voluminous rectal output of gas/stool in the ED, suggesting that the sigmoid volvulus was a self-limited phenomenon likely secondary to pseudo-obstruction, possibly as a paraneoplastic phenomenon or secondary effect of whatever primary hypermetabolic systemic process (intracranial/CNS vs malignancy vs infection vs autoimmune/rheumatologic) could be occurring. Clinically he remains vitally and biochemically stable without evidence of systemic toxicity, and there is minimal concern for evolving bowel ischemia at this time, especially with resolution of mild lactic acidosis following fluid resuscitation. He does merit further non-urgent colonoscopic evaluation to rule out a mechanical obstructing lesion that could have precipitated this episode of sigmoid volvulus. However, with his heme-negative stool, and more pressing neurologic / encephalopathic issues at this time, this can occur in the outpatient setting once patient has recovered from his acute phase  illness.    Recommendations:  -CLD  - Bowel purge today for colonoscopy tomorrow  -Colonoscopy 8/20   -NPO MN   -Hgb >8, Plt >50, INR <1.5   -Ensure glucose is well controlled  - Avoid anticholinergic, opioids, antimotility meds

## 2021-08-06 NOTE — ASSESSMENT & PLAN NOTE
Not on home meds for this  May be partially anxiety mediated  Improving now on nifedipine started here

## 2021-08-07 LAB
ALBUMIN SERPL-MCNC: 3.4 G/DL (ref 3.4–5)
ALP SERPL-CCNC: 52 IU/L (ref 35–126)
ALT SERPL-CCNC: 12 IU/L (ref 16–63)
ANION GAP SERPL CALC-SCNC: 12 MEQ/L (ref 3–15)
AST SERPL-CCNC: 13 IU/L (ref 15–41)
ATRIAL RATE: 106
ATRIAL RATE: 81
BASOPHILS # BLD: 0.02 K/UL (ref 0.01–0.1)
BASOPHILS NFR BLD: 0.4 %
BILIRUB SERPL-MCNC: 0.9 MG/DL (ref 0.3–1.2)
BUN SERPL-MCNC: 6 MG/DL (ref 8–20)
CALCIUM SERPL-MCNC: 8.8 MG/DL (ref 8.9–10.3)
CHLORIDE SERPL-SCNC: 103 MEQ/L (ref 98–109)
CHOLEST SERPL-MCNC: 108 MG/DL
CO2 SERPL-SCNC: 24 MEQ/L (ref 22–32)
CREAT SERPL-MCNC: 1.1 MG/DL (ref 0.8–1.3)
DIFFERENTIAL METHOD BLD: ABNORMAL
EOSINOPHIL # BLD: 0.08 K/UL (ref 0.04–0.54)
EOSINOPHIL NFR BLD: 1.5 %
ERYTHROCYTE [DISTWIDTH] IN BLOOD BY AUTOMATED COUNT: 12.5 % (ref 11.6–14.4)
ERYTHROCYTE [SEDIMENTATION RATE] IN BLOOD BY WESTERGREN METHOD: 11 MM/HR
GFR SERPL CREATININE-BSD FRML MDRD: >60 ML/MIN/1.73M*2
GLUCOSE BLD-MCNC: 152 MG/DL (ref 70–99)
GLUCOSE BLD-MCNC: 186 MG/DL (ref 70–99)
GLUCOSE BLD-MCNC: 208 MG/DL (ref 70–99)
GLUCOSE BLD-MCNC: 259 MG/DL (ref 70–99)
GLUCOSE SERPL-MCNC: 137 MG/DL (ref 70–99)
HCT VFR BLDCO AUTO: 41.3 % (ref 40.1–51)
HDLC SERPL-MCNC: 30 MG/DL
HDLC SERPL: 3.6 {RATIO}
HGB BLD-MCNC: 13.5 G/DL (ref 13.7–17.5)
IMM GRANULOCYTES # BLD AUTO: 0.02 K/UL (ref 0–0.08)
IMM GRANULOCYTES NFR BLD AUTO: 0.4 %
LDLC SERPL CALC-MCNC: 69 MG/DL
LYMPHOCYTES # BLD: 1.55 K/UL (ref 1.2–3.5)
LYMPHOCYTES NFR BLD: 29.2 %
MCH RBC QN AUTO: 28.2 PG (ref 28–33.2)
MCHC RBC AUTO-ENTMCNC: 32.7 G/DL (ref 32.2–36.5)
MCV RBC AUTO: 86.4 FL (ref 83–98)
MONOCYTES # BLD: 0.44 K/UL (ref 0.3–1)
MONOCYTES NFR BLD: 8.3 %
NEUTROPHILS # BLD: 3.2 K/UL (ref 1.7–7)
NEUTS SEG NFR BLD: 60.2 %
NONHDLC SERPL-MCNC: 78 MG/DL
NRBC BLD-RTO: 0 %
P AXIS: 42
P AXIS: 62
PDW BLD AUTO: 10.2 FL (ref 9.4–12.4)
PLATELET # BLD AUTO: 189 K/UL (ref 150–350)
POCT TEST: ABNORMAL
POTASSIUM SERPL-SCNC: 3.9 MEQ/L (ref 3.6–5.1)
PR INTERVAL: 164
PR INTERVAL: 180
PROT SERPL-MCNC: 5.4 G/DL (ref 6–8.2)
QRS DURATION: 102
QRS DURATION: 98
QT INTERVAL: 352
QT INTERVAL: 362
QTC CALCULATION(BAZETT): 420
QTC CALCULATION(BAZETT): 467
R AXIS: 34
R AXIS: 57
RBC # BLD AUTO: 4.78 M/UL (ref 4.5–5.8)
SODIUM SERPL-SCNC: 139 MEQ/L (ref 136–144)
T WAVE AXIS: 14
T WAVE AXIS: 39
TRIGL SERPL-MCNC: 45 MG/DL (ref 30–149)
VENTRICULAR RATE: 106
VENTRICULAR RATE: 81
WBC # BLD AUTO: 5.31 K/UL (ref 3.8–10.5)

## 2021-08-07 PROCEDURE — 63700000 HC SELF-ADMINISTRABLE DRUG: Performed by: STUDENT IN AN ORGANIZED HEALTH CARE EDUCATION/TRAINING PROGRAM

## 2021-08-07 PROCEDURE — 84155 ASSAY OF PROTEIN SERUM: CPT | Performed by: STUDENT IN AN ORGANIZED HEALTH CARE EDUCATION/TRAINING PROGRAM

## 2021-08-07 PROCEDURE — 80061 LIPID PANEL: CPT | Performed by: STUDENT IN AN ORGANIZED HEALTH CARE EDUCATION/TRAINING PROGRAM

## 2021-08-07 PROCEDURE — 85652 RBC SED RATE AUTOMATED: CPT | Performed by: PSYCHIATRY & NEUROLOGY

## 2021-08-07 PROCEDURE — 93010 ELECTROCARDIOGRAM REPORT: CPT | Mod: 76 | Performed by: INTERNAL MEDICINE

## 2021-08-07 PROCEDURE — 96372 THER/PROPH/DIAG INJ SC/IM: CPT

## 2021-08-07 PROCEDURE — 82374 ASSAY BLOOD CARBON DIOXIDE: CPT | Performed by: STUDENT IN AN ORGANIZED HEALTH CARE EDUCATION/TRAINING PROGRAM

## 2021-08-07 PROCEDURE — 93010 ELECTROCARDIOGRAM REPORT: CPT | Performed by: INTERNAL MEDICINE

## 2021-08-07 PROCEDURE — 99226 PR SBSQ OBSERVATION CARE/DAY 35 MINUTES: CPT | Performed by: STUDENT IN AN ORGANIZED HEALTH CARE EDUCATION/TRAINING PROGRAM

## 2021-08-07 PROCEDURE — G0378 HOSPITAL OBSERVATION PER HR: HCPCS

## 2021-08-07 PROCEDURE — 93005 ELECTROCARDIOGRAM TRACING: CPT | Performed by: STUDENT IN AN ORGANIZED HEALTH CARE EDUCATION/TRAINING PROGRAM

## 2021-08-07 PROCEDURE — 63600000 HC DRUGS/DETAIL CODE: Performed by: STUDENT IN AN ORGANIZED HEALTH CARE EDUCATION/TRAINING PROGRAM

## 2021-08-07 PROCEDURE — 99221 1ST HOSP IP/OBS SF/LOW 40: CPT | Performed by: PSYCHIATRY & NEUROLOGY

## 2021-08-07 PROCEDURE — 99214 OFFICE O/P EST MOD 30 MIN: CPT | Performed by: PSYCHIATRY & NEUROLOGY

## 2021-08-07 PROCEDURE — 36415 COLL VENOUS BLD VENIPUNCTURE: CPT | Performed by: PSYCHIATRY & NEUROLOGY

## 2021-08-07 PROCEDURE — 85025 COMPLETE CBC W/AUTO DIFF WBC: CPT | Performed by: STUDENT IN AN ORGANIZED HEALTH CARE EDUCATION/TRAINING PROGRAM

## 2021-08-07 PROCEDURE — 83921 ORGANIC ACID SINGLE QUANT: CPT | Performed by: STUDENT IN AN ORGANIZED HEALTH CARE EDUCATION/TRAINING PROGRAM

## 2021-08-07 PROCEDURE — 96372 THER/PROPH/DIAG INJ SC/IM: CPT | Mod: 59 | Performed by: INTERNAL MEDICINE

## 2021-08-07 PROCEDURE — 63600000 HC DRUGS/DETAIL CODE: Performed by: PSYCHIATRY & NEUROLOGY

## 2021-08-07 RX ORDER — CYANOCOBALAMIN 1000 UG/ML
1000 INJECTION, SOLUTION INTRAMUSCULAR; SUBCUTANEOUS ONCE
Status: COMPLETED | OUTPATIENT
Start: 2021-08-08 | End: 2021-08-08

## 2021-08-07 RX ORDER — MIRTAZAPINE 15 MG/1
15 TABLET, FILM COATED ORAL NIGHTLY
Status: DISCONTINUED | OUTPATIENT
Start: 2021-08-07 | End: 2021-08-10 | Stop reason: HOSPADM

## 2021-08-07 RX ADMIN — INSULIN ASPART 3 UNITS: 100 INJECTION, SOLUTION INTRAVENOUS; SUBCUTANEOUS at 17:29

## 2021-08-07 RX ADMIN — MAGNESIUM OXIDE TAB 400 MG (241.3 MG ELEMENTAL MG) 400 MG: 400 (241.3 MG) TAB at 20:21

## 2021-08-07 RX ADMIN — INSULIN ASPART 3 UNITS: 100 INJECTION, SOLUTION INTRAVENOUS; SUBCUTANEOUS at 12:48

## 2021-08-07 RX ADMIN — POLYETHYLENE GLYCOL 3350 17 G: 17 POWDER, FOR SOLUTION ORAL at 08:30

## 2021-08-07 RX ADMIN — Medication 3 MG: at 21:45

## 2021-08-07 RX ADMIN — MAGNESIUM OXIDE TAB 400 MG (241.3 MG ELEMENTAL MG) 400 MG: 400 (241.3 MG) TAB at 08:29

## 2021-08-07 RX ADMIN — MIRTAZAPINE 15 MG: 15 TABLET, FILM COATED ORAL at 21:45

## 2021-08-07 RX ADMIN — INSULIN ASPART 3 UNITS: 100 INJECTION, SOLUTION INTRAVENOUS; SUBCUTANEOUS at 21:45

## 2021-08-07 RX ADMIN — NIFEDIPINE 30 MG: 30 TABLET, FILM COATED, EXTENDED RELEASE ORAL at 08:29

## 2021-08-07 RX ADMIN — LATANOPROST 1 DROP: 50 SOLUTION/ DROPS OPHTHALMIC at 21:45

## 2021-08-07 RX ADMIN — TIMOLOL MALEATE 1 DROP: 5 SOLUTION/ DROPS OPHTHALMIC at 08:31

## 2021-08-07 RX ADMIN — ATORVASTATIN CALCIUM 10 MG: 10 TABLET, FILM COATED ORAL at 08:29

## 2021-08-07 RX ADMIN — CYANOCOBALAMIN 1000 MCG: 1000 INJECTION, SOLUTION INTRAMUSCULAR; SUBCUTANEOUS at 08:34
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
## 2021-08-07 NOTE — PROGRESS NOTES
NEUROLOGY PROGRESS NOTE    Interval History: No acute events overnight.  The patient remained clinically stable, without any new or worsening neurological symptoms overnight.    In fact, the patient has had significant improvement in his overall neurological/psychiatric condition, and reports having significant improvement in his ongoing cognitive symptoms.  He reports feeling much more reassured in his cognitive abilities  with the ongoing medical management, and notes having had a detailed conversation about his ongoing symptoms and stressors with his wife - which has also provided significant symptomatic benefit.      Review of Systems: Pertinent items are noted in HPI.      Current Facility-Administered Medications   Medication Dose Route Frequency Provider Last Rate Last Admin   • acetaminophen (TYLENOL) tablet 650 mg  650 mg oral q4h PRN Lesli Aguilar MD       • atorvastatin (LIPITOR) tablet 10 mg  10 mg oral Daily Lesli Aguilar MD   10 mg at 08/07/21 0829   • [START ON 8/8/2021] cyanocobalamin (VITAMIN B-12) injection 1,000 mcg  1,000 mcg intramuscular Once Efrain Gore MD       • glucose chewable tablet 16-32 g of dextrose  16-32 g of dextrose oral PRN Lesli Aguilar MD        Or   • dextrose 40 % oral gel 15-30 g of dextrose  15-30 g of dextrose oral PRN Lesli Aguilar MD        Or   • glucagon (GLUCAGEN) injection 1 mg  1 mg intramuscular PRN Lesli Aguilar MD        Or   • dextrose in water injection 12.5 g  25 mL intravenous PRN Lesli Aguilar MD       • insulin aspart U-100 (NovoLOG) pen 3-5 Units  3-5 Units subcutaneous With meals & nightly Lesli Aguilar MD   3 Units at 08/07/21 1729   • latanoprost (XALATAN) 0.005 % ophthalmic solution 1 drop  1 drop Both Eyes Nightly Lesli Aguilar MD   1 drop at 08/06/21 2145   • magnesium oxide (MAG-OX) tablet 400 mg  400 mg oral BID Lesli Aguilar MD   400 mg at 08/07/21 0829   • melatonin ODT 3 mg  3 mg oral Nightly Lesli Aguilar MD   3 mg at 08/06/21 2112   •  "mirtazapine (REMERON) tablet 15 mg  15 mg oral Nightly Lesli Aguilar MD       • NIFEdipine (ADALAT CC, PROCARDIA XL) tablet extended release 30 mg  30 mg oral Daily Lesli Aguilar MD   30 mg at 08/07/21 0829   • polyethylene glycol (MIRALAX) 17 gram packet 17 g  17 g oral Daily Lesli Aguilar MD   17 g at 08/07/21 0830   • timolol (TIMOPTIC) 0.5 % ophthalmic solution 1 drop  1 drop Both Eyes Daily Lesli Aguilar MD   1 drop at 08/07/21 0831       Patient has no known allergies.       Objective     Vital signs in last 24 hours:  Temp:  [36.6 °C (97.8 °F)-37.2 °C (99 °F)] 37 °C (98.6 °F)  Heart Rate:  [] 84  Resp:  [16-18] 16  BP: (143-192)/() 144/85      Physical Exam  General Appearance: Middle-aged man, age appropriate appearance without any acute clinical distress  Cardiac:  Borderline tachycardia, with heart rate ~100s;  regular rhythm, no peripheral edema  Skin: Warm; anterior chest keloid lesion is noted    Neurologic Exam:  Head/Neck: No scalp tenderness on palpation  Face: Slightly up slanted palpebral fissures are noted; otherwise no facial dysmorphic features are evident    Mental Status: The patient's detailed neurocognitive assessment revealed no significant abnormalities today, with complete resolution of all cognitive/psychiatric dysfunction seen yesterday.  Details are noted as following:    - Level of Consciousness: Well Awake  - Alertness: Alert in response to voice with appropriately timed responses  - Attention: Normal - Able to spell JV backwards quickly and without any problems; able to recite months of the year backwards.  - Orientation: Intact to person/self; Intact to time (day/date/month/year); Intact to location; Intact to situation appropriately  - Neglect - No unilateral visual or spatial neglect noted on exam  - Mood/Affect -\"better\" mood with much more quintero, euthymic appearing affect compared to yesterday  - Behavior/Cooperation -  Pleasant and calmer in his overall " demeanor; remains very polite and respectful of the examiner; cooperates well with the current examination.    - Language: Speech is fluent with normal prosody, without any abnormalities noted today   1. Naming - Names all simple objects to confrontation; able to name parts of objects  2. Comprehension - Able to follow simple commands well; Able to follow complex 4 step commands; able to perform commands crossing Right-Left without any difficulties  3. Repetition - Able to repeat very complex  sentences without difficulty  - Calculations: Able to perform simple calculations - Able to calculate quarters in $3.75 correctly after prolonged pauses  - No Ideomotor apraxia  - No left-right confusion    Cranial Nerves:  CN II: Visual Fields: Intact to confrontation in all quadrants bilaterally without any neglect on double simultaneous stimulation; Pupils are equal, round and reactive to light bilaterally  CN III/IV/VI: Extraocular movements are intact bilaterally in all directions without any nystagmus; Normal smooth pursuit; No ptosis bilaterally  CN V: Normal facial sensation bilaterally  CN VII: Normal symmetric facial movement at rest, with smiling and with speech in both upper and lower facial distributions  CN IX/X: Symmetric palate elevation, No uvular deviation, No dysarthria noted  CN XI: Normal 5/5 shoulder shrug bilaterally  CN XII: Normal/midline tongue protrusion    Motor:  Bulk: No focal atrophy noted on visual inspection of the extremities  Tone: Normal tone without any spasticity or hypotonia noted on exam  Bradykinesia: Absent  Abnormal movements: Absent    Strength:  Action/Muscle  Shoulder abduction/Deltoids - 5/5 bilaterally  Elbow Flexion/Biceps - 5/5 bilaterally  Elbow Extension/Triceps - 5/5 bilaterally  Wrist Flexion - 5/5 bilaterally  Wrist Extension - 5/5 bilaterally   strength/Interosseous - 5/5 bilaterally  Hip Flexion/Iliopsoas - 5/5 bilaterally  Hip Abduction - 5/5 bilaterally  Hip  Adduction - 5/5 bilaterally  Knee Flexion/Hamstrings - 5/5 bilaterally  Knee Extension/Quadriceps - 5/5 bilaterally  Ankle Dorsiflexion/Tibialis Ant - 5/5 bilaterally  Ankle Planter flexion/Gastroc - 5/5 bilaterally      Sensory:  Sensation intact to light touch, pinprick in both upper and lower extremities bilaterally  No neglect on double simultaneous stimulation    Reflexes:   Reflex Name: Right Left  Biceps                2+   2+  Brachioradialis   2+   2+  Knee Jerk          2+   2+  Ankle Jerk         2+   2+   Degroot     Absent Absent  Babinski     Absent   Absent  Clonus          None   None     Frontal release signs:  Grasp reflex: Absent  Palmomental reflex: Absent  Snout reflex: Absent  Glabellar response: Normal      Coordination:  No truncal ataxia  No dysmetria on Finger to nose bilaterally          LABS:  I have reviewed the patient's lab results, and noted pertinent findings. I have reviewed the labs as noted below:    Admission on 08/06/2021   Component Date Value   • POCT Bedside Glucose 08/06/2021 168*   • POC Test 08/06/2021 POC    • Lactate 08/06/2021 2.2*   • WBC 08/06/2021 5.50    • RBC 08/06/2021 5.18    • Hemoglobin 08/06/2021 14.5    • Hematocrit 08/06/2021 46.0    • MCV 08/06/2021 88.8    • MCH 08/06/2021 28.0    • MCHC 08/06/2021 31.5*   • RDW 08/06/2021 12.5    • Platelets 08/06/2021 220    • MPV 08/06/2021 10.2    • Differential Type 08/06/2021 Auto    • nRBC 08/06/2021 0.0    • Immature Granulocytes 08/06/2021 0.2    • Neutrophils 08/06/2021 65.5    • Lymphocytes 08/06/2021 25.1    • Monocytes 08/06/2021 6.5    • Eosinophils 08/06/2021 2.2    • Basophils 08/06/2021 0.5    • Immature Granulocytes, A* 08/06/2021 0.01    • Neutrophils, Absolute 08/06/2021 3.60    • Lymphocytes, Absolute 08/06/2021 1.38    • Monocytes, Absolute 08/06/2021 0.36    • Eosinophils, Absolute 08/06/2021 0.12    • Basophils, Absolute 08/06/2021 0.03    • Sodium 08/06/2021 140    • Potassium 08/06/2021 4.4    •  Chloride 08/06/2021 102    • CO2 08/06/2021 28    • BUN 08/06/2021 12    • Creatinine 08/06/2021 1.2    • Glucose 08/06/2021 177*   • Calcium 08/06/2021 10.0    • AST (SGOT) 08/06/2021 17    • ALT (SGPT) 08/06/2021 16    • Alkaline Phosphatase 08/06/2021 58    • Total Protein 08/06/2021 6.7    • Albumin 08/06/2021 4.0    • Bilirubin, Total 08/06/2021 0.5    • eGFR 08/06/2021 >60.0    • Anion Gap 08/06/2021 10    • Troponin I 08/06/2021 <0.03    • Ventricular rate 08/06/2021 81    • Atrial rate 08/06/2021 81    • SD Interval 08/06/2021 164    • QRS duration 08/06/2021 98    • QT Interval 08/06/2021 362    • QTC Calculation(Bazett) 08/06/2021 420    • P Axis 08/06/2021 42    • R Axis 08/06/2021 34    • T Wave Axis 08/06/2021 14    • Color, Urine 08/06/2021 Yellow    • Clarity, Urine 08/06/2021 Clear    • Specific Gravity, Urine 08/06/2021 1.025    • pH, Urine 08/06/2021 6.0    • Leukocyte Esterase 08/06/2021 Negative    • Nitrite, Urine 08/06/2021 Negative    • Protein, Urine 08/06/2021 Negative    • Glucose, Urine 08/06/2021 Negative    • Ketones, Urine 08/06/2021 +2*   • Urobilinogen, Urine 08/06/2021 1.0    • Bilirubin, Urine 08/06/2021 +1*   • Blood, Urine 08/06/2021 Negative    • Lactate 08/06/2021 1.6    • Magnesium 08/06/2021 1.5*   • TSH 08/06/2021 1.57    • PCP Scrn, Ur 08/06/2021 Not Detected    • Benzodiazepine Ur Qual 08/06/2021 Not Detected    • Cocaine Screen, Urine 08/06/2021 Not Detected    • Amphetamine+Methamphetam* 08/06/2021 Not Detected    • Cannabinoid Screen, Urine 08/06/2021 Not Detected    • Opiate Scrn, Ur 08/06/2021 Not Detected    • Barbiturate Screen, Ur 08/06/2021 Not Detected    • SARS-CoV-2 (COVID-19) 08/06/2021 Negative    • Hemoglobin A1C 08/06/2021 7.8*   • Estimated Ave Glucose 08/06/2021 177    • Iron 08/06/2021 116    • TIBC 08/06/2021 395    • UIBC 08/06/2021 279    • Iron Saturation 08/06/2021 29    • Ferritin 08/06/2021 21*   • RBC, Urine 08/06/2021 0 TO 4    • WBC, Urine  08/06/2021 0 TO 3    • Squamous Epithelial 08/06/2021 +1*   • Hyaline Cast 08/06/2021 0 TO 2*   • Bacteria, Urine 08/06/2021 None Seen    • MUCUSUA 08/06/2021 +1*   • Total CK 08/06/2021 61    • POCT Bedside Glucose 08/06/2021 101*   • POC Test 08/06/2021 POC    • POCT Bedside Glucose 08/06/2021 176*   • POC Test 08/06/2021 POC    • POCT Bedside Glucose 08/06/2021 141*   • POC Test 08/06/2021 POC    • CRP 08/06/2021 <6.00    • Vitamin B-12 08/06/2021 184    • Folate 08/06/2021 >20.0    • Sodium 08/07/2021 139    • Potassium 08/07/2021 3.9    • Chloride 08/07/2021 103    • CO2 08/07/2021 24    • BUN 08/07/2021 6*   • Creatinine 08/07/2021 1.1    • Glucose 08/07/2021 137*   • Calcium 08/07/2021 8.8*   • AST (SGOT) 08/07/2021 13*   • ALT (SGPT) 08/07/2021 12*   • Alkaline Phosphatase 08/07/2021 52    • Total Protein 08/07/2021 5.4*   • Albumin 08/07/2021 3.4    • Bilirubin, Total 08/07/2021 0.9    • eGFR 08/07/2021 >60.0    • Anion Gap 08/07/2021 12    • WBC 08/07/2021 5.31    • RBC 08/07/2021 4.78    • Hemoglobin 08/07/2021 13.5*   • Hematocrit 08/07/2021 41.3    • MCV 08/07/2021 86.4    • MCH 08/07/2021 28.2    • MCHC 08/07/2021 32.7    • RDW 08/07/2021 12.5    • Platelets 08/07/2021 189    • MPV 08/07/2021 10.2    • Differential Type 08/07/2021 Auto    • nRBC 08/07/2021 0.0    • Immature Granulocytes 08/07/2021 0.4    • Neutrophils 08/07/2021 60.2    • Lymphocytes 08/07/2021 29.2    • Monocytes 08/07/2021 8.3    • Eosinophils 08/07/2021 1.5    • Basophils 08/07/2021 0.4    • Immature Granulocytes, A* 08/07/2021 0.02    • Neutrophils, Absolute 08/07/2021 3.20    • Lymphocytes, Absolute 08/07/2021 1.55    • Monocytes, Absolute 08/07/2021 0.44    • Eosinophils, Absolute 08/07/2021 0.08    • Basophils, Absolute 08/07/2021 0.02    • Triglycerides 08/07/2021 45    • Cholesterol 08/07/2021 108    • HDL 08/07/2021 30*   • LDL Calculated 08/07/2021 69    • Non-HDL, Calculated 08/07/2021 78    • RISK 08/07/2021 3.6    • Sed  Rate 08/07/2021 11    • POCT Bedside Glucose 08/07/2021 152*   • POC Test 08/07/2021 POC    • Ventricular rate 08/07/2021 106    • Atrial rate 08/07/2021 106    • SD Interval 08/07/2021 180    • QRS duration 08/07/2021 102    • QT Interval 08/07/2021 352    • QTC Calculation(Bazett) 08/07/2021 467    • P Axis 08/07/2021 62    • R Axis 08/07/2021 57    • T Wave Axis 08/07/2021 39    • POCT Bedside Glucose 08/07/2021 259*   • POC Test 08/07/2021 POC    • POCT Bedside Glucose 08/07/2021 186*   • POC Test 08/07/2021 POC               IMAGING:  I have personally reviewed the images for the NeuroImaging studies listed below. My interpretation is noted as following:     MRI Brain with and without contrast: 8/6/2021  No significant intracranial abnormalities identified, including no evidence of acute intracranial ischemia.  No abnormal contrast-enhancement to suggest any acute intracranial inflammatory, malignant/neoplastic or infectious abnormalities.        Assessment:  The patient's overall clinical presentation, with detailed serial neurological examinations, contrast enhanced MRI Brain as well as EEG monitoring, has failed to reveal any significant primary neurological abnormalities.  There is no clear primary neurological abnormality evident at this time.     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        Recommendations:  --- The patient is noted to have severe B12 deficiency, with B12 level 184, potentially related to his ongoing GI dysfunction. The patient is strongly recommended to supplement B12 with 1000 mcg IM injections monthly, for 6 months with his Primary care provider, with repeat B12 level there after. Long term B12 supplementation can be determined based on his clinical course, with suggested goal B12 level > 500.    --- Otherwise, the patient does not  require any further neurological work-up or management at this time. He is encouraged to continue optimal psychiatric management for long term management.    Other acute medical/GI management is deferred to the patient's Primary team.     The patient has been counseled extensively on the Impression/Recommendations noted above.  All questions are answered.  The patient's case/recommendations have been discussed with Dr. Aguilar/primary hospitalist team.    The Inpatient Neurology team will sign off at this time, but remains available in case any new/worsening neurologic symptoms or concerns arise. Please contact us whenever we can be of any assistance.

## 2021-08-07 NOTE — CONSULTS
"  Neurology Consult Note    Reason for Consultation: Progressive cognitive dysfunction  Chief Complaint: \" I had trouble working\"    History of Presenting Illness:  Stephane Kirby is a 51 y.o. Right-handed male with medical history most notable for Diabetes, who presented to the ER today with ~3-week history of progressively worsening cognitive complaints.    History is obtained from the patient.  In addition, clinical information is obtained from Dr. Muñiz/ER team and from Dr. Aguilar/admitting Medicine team.    The patient describes having difficulty while working in the past 3 weeks or so, with his symptoms reportedly becoming more intense over the past few days.  He describes his symptoms as a difficulty with keeping track of his work-related tasks, especially with tasks involving numbers, making frequent mistakes and requiring supervision from other colleagues.  The patient notes being \"hypervigilant\" about his symptoms, which has allowed him to keep working for the most part, despite significant difficulties.  Outside of work, the patient reports having periods of confusion and difficulty with speech, which can occur randomly.  The patient describes his speech symptoms as difficulty with saying a particular word, eventually leading to significant stuttering in the process. The patient eventually sought medical care earlier today, when he reportedly visited his Primary Care, but was in turn referred to the ER.    The patient describes having abdominal bloating with pain, difficulty with constipation, loss of appetite, and significant weight loss (as much as 20 pounds in the past few weeks), along with his cognitive complaints noted above.    The patient reports that his cognitive symptoms appear to have started in the aftermath of an accident, which resulted in the death of one of his coworkers, with whom he was friends with and had worked for \"19 years together\".  The patient acknowledges being \"extremely " "stressed\" after the sudden death of his friend, while also noting that he usually does not share the stress with his family or others.    The patient denies having any significant history of neurological or psychiatric disease in the past.  He denies any recent infectious symptoms.  He denies any changes in his medications.  He denies any illicit drug use.  He denies any significant alcohol use.  He denies any recent travel.    Review of Systems  All other systems reviewed and negative except as noted in the HPI.    Allergies: Patient has no known allergies.    Current Inpatient Medications   Medication Dose Route Frequency Provider Last Rate Last Admin   • acetaminophen (TYLENOL) tablet 650 mg  650 mg oral q4h PRN Lesli Aguilar MD       • atorvastatin (LIPITOR) tablet 10 mg  10 mg oral Daily Lesli Aguilar MD   10 mg at 08/06/21 1627   • glucose chewable tablet 16-32 g of dextrose  16-32 g of dextrose oral PRN Lesli Aguilar MD        Or   • dextrose 40 % oral gel 15-30 g of dextrose  15-30 g of dextrose oral PRN Lesli Aguilar MD        Or   • glucagon (GLUCAGEN) injection 1 mg  1 mg intramuscular PRN Lesli Aguilar MD        Or   • dextrose in water injection 12.5 g  25 mL intravenous PRN Lesli Aguilar MD       • insulin aspart U-100 (NovoLOG) pen 3-5 Units  3-5 Units subcutaneous With meals & nightly Lesli Aguilar MD       • latanoprost (XALATAN) 0.005 % ophthalmic solution 1 drop  1 drop Both Eyes Nightly Lesli Aguilar MD   1 drop at 08/06/21 2145   • magnesium oxide (MAG-OX) tablet 400 mg  400 mg oral BID Lesli Aguilar MD   400 mg at 08/06/21 2112   • melatonin ODT 3 mg  3 mg oral Nightly Lesli Aguilar MD   3 mg at 08/06/21 2112   • NIFEdipine (ADALAT CC, PROCARDIA XL) tablet extended release 30 mg  30 mg oral Daily Lesli Aguilar MD   30 mg at 08/06/21 2112   • polyethylene glycol (MIRALAX) 17 gram packet 17 g  17 g oral Daily Lesli Aguilar MD   17 g at 08/06/21 2112   • timolol (TIMOPTIC) 0.5 % ophthalmic " solution 1 drop  1 drop Both Eyes Daily Lesli Aguilar MD           Medical History:   Past Medical History:   Diagnosis Date   • Glaucoma    • Lipid disorder    • Type 2 diabetes mellitus (CMS/HCC)        Surgical History: History reviewed. No pertinent surgical history.    Social History:   Social History     Socioeconomic History   • Marital status: Single     Spouse name: None   • Number of children: None   • Years of education: None   • Highest education level: None   Occupational History   • None   Tobacco Use   • Smoking status: Former Smoker   • Smokeless tobacco: Never Used   Substance and Sexual Activity   • Alcohol use: Yes     Comment: rarely   • Drug use: Not Currently   • Sexual activity: None   Other Topics Concern   • None   Social History Narrative   • None     Social Determinants of Health     Financial Resource Strain:    • Difficulty of Paying Living Expenses:    Food Insecurity: No Food Insecurity   • Worried About Running Out of Food in the Last Year: Never true   • Ran Out of Food in the Last Year: Never true   Transportation Needs:    • Lack of Transportation (Medical):    • Lack of Transportation (Non-Medical):    Physical Activity:    • Days of Exercise per Week:    • Minutes of Exercise per Session:    Stress:    • Feeling of Stress :    Social Connections:    • Frequency of Communication with Friends and Family:    • Frequency of Social Gatherings with Friends and Family:    • Attends [employer]ism Services:    • Active Member of Clubs or Organizations:    • Attends Club or Organization Meetings:    • Marital Status:    Intimate Partner Violence:    • Fear of Current or Ex-Partner:    • Emotionally Abused:    • Physically Abused:    • Sexually Abused:        Family History: History reviewed. No pertinent family history.    Objective     Temp:  [35.8 °C (96.4 °F)-37.1 °C (98.8 °F)] 37.1 °C (98.8 °F)  Heart Rate:  [] 100  Resp:  [16-20] 16  BP: (150-192)/() 162/92  SpO2:  [98 %-100  "%] 98 %  Oxygen Therapy: None (Room air)    Physical Exam  General Appearance: Middle-aged man, age appropriate appearance without any acute clinical distress  Cardiac: Tachycardia with heart rate 110s to 120s, with a regular rhythm, no peripheral edema  Skin: Warm; anterior chest keloid lesion is noted    Neurologic Exam:  Head/Neck: No scalp tenderness on palpation  Face: Slightly up slanted palpebral fissures are noted; otherwise no facial dysmorphic features are evident    Mental Status: The patient's detailed neurocognitive assessment revealed an overarching component of internal inconsistencies in his performance on different tasks checking for the same neurocognitive function, while lacking any significant objective deficits on examination.    - Level of Consciousness: Well Awake  - Alertness: Alert in response to voice with appropriately timed responses  - Attention: Overall intact, but with internal inconsistencies between different tasks to assess complex Attention.  For example, the patient is able to spell JOS quickly and without any problems; however, when reciting months of the year backwards, he made frequent prolonged pauses and required a lot of reassurance to continue the task.  It is noted that he had excellent attention span and never made a mistake during this task even though he had prolonged periods of delay in this task, where he would complain about not being able to do the said task to the examiner.    - Orientation: Intact to person/self; Intact to time (day/date/month/year); Intact to location; Intact to situation appropriately  - Neglect - No unilateral visual or spatial neglect noted on exam  - Mood/Affect -\"stressed\" mood with somewhat restricted, dysthymic appearing affect  - Behavior/Cooperation - At times, odd in his behavior while appearing excessively anxious; however he is noted to be very polite and respectful of the examiner, and was able to cooperate with the entire exam " with periodic reassurance and repetitive directions    - Language: Speech is fluent with normal prosody; intermittent psychogenic stuttering is noted, without any actual aphasia  1. Naming - Names all simple objects to confrontation; able to name parts of objects  2. Comprehension - Able to follow simple commands well; Able to follow complex 3 step commands crossing Right-Left without any difficulties  3. Repetition - Able to repeat simple and complex without difficulty  - Calculations: Able to perform simple calculations - Able to calculate quarters in $1.75 correctly after prolonged pauses  - No Ideomotor apraxia  - No left-right confusion    Cranial Nerves:  CN II: Visual Fields: Intact to confrontation in all quadrants bilaterally without any neglect on double simultaneous stimulation; Pupils are equal, round and reactive to light bilaterally; Fundus exam: Optic discs poorly visualized   CN III/IV/VI: Extraocular movements are intact bilaterally in all directions without any nystagmus; Normal smooth pursuit; No ptosis bilaterally  CN V: Normal facial sensation in V1, V2 and V3 distributions bilaterally; Good bite strength bilaterally  CN VII: Normal symmetric facial movement at rest, with smiling and with speech in both upper and lower facial distributions  CN VIII: Hearing intact to finger rubbing on both sides  CN IX/X: Symmetric palate elevation, No uvular deviation, No dysarthria noted  CN XI: Normal/strong head turning bilaterally; Normal 5/5 shoulder shrug bilaterally  CN XII: Normal/midline tongue protrusion    Motor:  Bulk: No focal atrophy noted on visual inspection of the extremities  Tone: Normal tone without any spasticity or hypotonia noted on exam  Bradykinesia: Absent  Abnormal movements: Absent    Strength:  Action/Muscle  Shoulder abduction/Deltoids - 5/5 bilaterally  Elbow Flexion/Biceps - 5/5 bilaterally  Elbow Extension/Triceps - 5/5 bilaterally  Wrist Flexion - 5/5 bilaterally  Wrist  Extension - 5/5 bilaterally   strength/Interosseous - 5/5 bilaterally  Hip Flexion/Iliopsoas - 5/5 bilaterally  Hip Abduction - 5/5 bilaterally  Hip Adduction - 5/5 bilaterally  Knee Flexion/Hamstrings - 5/5 bilaterally  Knee Extension/Quadriceps - 5/5 bilaterally  Ankle Dorsiflexion/Tibialis Ant - 5/5 bilaterally  Ankle Planter flexion/Gastroc - 5/5 bilaterally      Sensory:  Sensation intact to light touch, pinprick in both upper and lower extremities bilaterally  No neglect on double simultaneous stimulation    Reflexes: The patient's reflexes are inconsistent between various attempts, with the patient having dramatic clonic responses on some attempts, while having completely normal responses on repeat attempts.  The patient tends to have more normal responses when he is distracted, but the degree of provoked clonic responses do not appear very typical for voluntary movement either.  The intermittent clonic responses are mostly seen in the arms, and rarely in the legs.  Regardless of the findings, no asymmetry is seen.    Reflex Name: Right Left  Biceps                3+   3+  Brachioradialis   3+   3+  Knee Jerk          2+   2+  Ankle Jerk         2+   2+   Degroot     Absent Absent  Babinski     Absent   Absent  Clonus          None   None    Frontal release signs:  Grasp reflex: Absent  Palmomental reflex: Absent  Snout reflex: Absent  Glabellar response: Abnormal, with the patient continuously blinking for as long as 30 seconds      Coordination:  No truncal ataxia  No dysmetria on Finger to nose or Heel to shin testing bilaterally    Gait:  No significant ataxia or imbalance is noted              Labs  I have reviewed the following lab results:  Admission on 08/06/2021   Component Date Value   • POCT Bedside Glucose 08/06/2021 168*   • POC Test 08/06/2021 POC    • Lactate 08/06/2021 2.2*   • WBC 08/06/2021 5.50    • RBC 08/06/2021 5.18    • Hemoglobin 08/06/2021 14.5    • Hematocrit 08/06/2021 46.0    •  MCV 08/06/2021 88.8    • MCH 08/06/2021 28.0    • MCHC 08/06/2021 31.5*   • RDW 08/06/2021 12.5    • Platelets 08/06/2021 220    • MPV 08/06/2021 10.2    • Differential Type 08/06/2021 Auto    • nRBC 08/06/2021 0.0    • Immature Granulocytes 08/06/2021 0.2    • Neutrophils 08/06/2021 65.5    • Lymphocytes 08/06/2021 25.1    • Monocytes 08/06/2021 6.5    • Eosinophils 08/06/2021 2.2    • Basophils 08/06/2021 0.5    • Immature Granulocytes, A* 08/06/2021 0.01    • Neutrophils, Absolute 08/06/2021 3.60    • Lymphocytes, Absolute 08/06/2021 1.38    • Monocytes, Absolute 08/06/2021 0.36    • Eosinophils, Absolute 08/06/2021 0.12    • Basophils, Absolute 08/06/2021 0.03    • Sodium 08/06/2021 140    • Potassium 08/06/2021 4.4    • Chloride 08/06/2021 102    • CO2 08/06/2021 28    • BUN 08/06/2021 12    • Creatinine 08/06/2021 1.2    • Glucose 08/06/2021 177*   • Calcium 08/06/2021 10.0    • AST (SGOT) 08/06/2021 17    • ALT (SGPT) 08/06/2021 16    • Alkaline Phosphatase 08/06/2021 58    • Total Protein 08/06/2021 6.7    • Albumin 08/06/2021 4.0    • Bilirubin, Total 08/06/2021 0.5    • eGFR 08/06/2021 >60.0    • Anion Gap 08/06/2021 10    • Troponin I 08/06/2021 <0.03    • Ventricular rate 08/06/2021 81    • Atrial rate 08/06/2021 81    • ND Interval 08/06/2021 164    • QRS duration 08/06/2021 98    • QT Interval 08/06/2021 362    • QTC Calculation(Bazett) 08/06/2021 420    • P Axis 08/06/2021 42    • R Axis 08/06/2021 34    • T Wave Axis 08/06/2021 14    • Color, Urine 08/06/2021 Yellow    • Clarity, Urine 08/06/2021 Clear    • Specific Gravity, Urine 08/06/2021 1.025    • pH, Urine 08/06/2021 6.0    • Leukocyte Esterase 08/06/2021 Negative    • Nitrite, Urine 08/06/2021 Negative    • Protein, Urine 08/06/2021 Negative    • Glucose, Urine 08/06/2021 Negative    • Ketones, Urine 08/06/2021 +2*   • Urobilinogen, Urine 08/06/2021 1.0    • Bilirubin, Urine 08/06/2021 +1*   • Blood, Urine 08/06/2021 Negative    • Lactate  "2021 1.6    • Magnesium 2021 1.5*   • TSH 2021 1.57    • PCP Scrn, Ur 2021 Not Detected    • Benzodiazepine Ur Qual 2021 Not Detected    • Cocaine Screen, Urine 2021 Not Detected    • Amphetamine+Methamphetam* 2021 Not Detected    • Cannabinoid Screen, Urine 2021 Not Detected    • Opiate Scrn, Ur 2021 Not Detected    • Barbiturate Screen, Ur 2021 Not Detected    • SARS-CoV-2 (COVID-19) 2021 Negative    • Hemoglobin A1C 2021 7.8*   • Estimated Ave Glucose 2021 177    • Iron 2021 116    • TIBC 2021 395    • UIBC 2021 279    • Iron Saturation 2021 29    • Ferritin 2021 21*   • RBC, Urine 2021 0 TO 4    • WBC, Urine 2021 0 TO 3    • Squamous Epithelial 2021 +1*   • Hyaline Cast 2021 0 TO 2*   • Bacteria, Urine 2021 None Seen    • MUCUSUA 2021 +1*   • Total CK 2021 61    • POCT Bedside Glucose 2021 101*   • POC Test 2021 POC    • POCT Bedside Glucose 2021 176*   • POC Test 2021 POC    • POCT Bedside Glucose 2021 141*   • POC Test 2021 POC    • CRP 2021 <6.00    • Vitamin B-12 2021 184    • Folate 2021 >20.0          Imaging  I have personally reviewed the images for the NeuroImaging studies listed below. My interpretation is noted as following:    CT head without contrast: 2021  No significant intracranial abnormality identified.    Other pertinent results:  EEG monitorin2021  Routine EEG is performed, revealing normal 10 Hz posterior dominant rhythm.  Normal awake and drowsy states were captured.  There were no focal or generalized electrographic abnormalities, epileptiform or otherwise.    CT abdomen/pelvis with IV contrast: 2021  \"Markedly redundant sigmoid colon.  There is some twisting of the mesentery with a relative distention of the sigmoid colon in the upper abdomen.\"  Question sigmoid " volvulus.    EKG at presentation: Normal sinus rhythm, HR 81          Assessment:  At this time, the patient's detailed neurological examination has failed to reveal any significant neuropathological abnormalities.  On the other hand, there is suggestion of psychiatrically mediated dysfunction in the patient's speech as well as other cognitive performance on my examination. In addition, his EEG monitoring has revealed normal cortical activity throughout.    From an isolated neurological perspective, no clear primary neurological abnormality is evident at this time, with the patient's presentation possibly representing a psychiatric etiology, especially given the extreme stressor immediately preceding the patient's symptom onset ~3 weeks ago.    However, the patient clearly has very significant organ dysfunction, albeit nonneurological, on his current assessment, including the ongoing concern for large intestinal volvulus, as well as ongoing sinus tachycardia of unknown cause.  He also reports significant B symptoms, including loss of appetite and rapid weight loss. It is unclear if these abnormalities are related to the patient's neuropsychiatric dysfunction at all, but certainly raise concern for the possibility of an atypical diagnosis.    Recommendations:  - The patient will undergo MRI Brain with and without contrast later tonight for further evaluation of any acute intracranial structural pathology, especially concentrating on any potential mesial temporal lobe abnormalities.  - Will obtain B12/Folate to screen for common nutritional causes potentially contributing to the patient's presentation; will also obtain ESR/CRP to screen for systemic inflammatory response    ---- Please continue to monitor the patient's clinical symptoms, and adjust his ongoing medical management accordingly.  ---- Will determine further neurological work-up/management based on the patient's MRI results, repeat examination tomorrow as  well as overall clinical course.     The patient has been counseled extensively on the Impression/Recommendations noted above.  All questions are answered.  The patient's case/recommendations have been discussed with Dr. Aguilar/primary hospitalist team in detail.

## 2021-08-07 NOTE — PLAN OF CARE
"Plan of Care Review  Plan of Care Reviewed With: patient  Progress: improving  Outcome Summary: Pt admitted to room 148 from ED. AAOx4, VSS. Despite orientation, pt states that he \"often needs to take a minute to remember what is going on.\" Frequest orientation checks obtained. No O2 needs, skin is warm, warm, dry, and intact. Ambulated with supervision within room. Bed alarm set, patient oriented to room and call bell use. Will continue to monitor.  "

## 2021-08-07 NOTE — CONSULTS
"Psychiatry Consult    Diagnosis: Encephalopathy [G93.40]  Sigmoid volvulus (CMS/HCC) [K56.2]  Altered mental status, unspecified altered mental status type [R41.82].  Chief Complaint:   Chief Complaint   Patient presents with   • Altered Mental Status   .    Stephane Kirby is a 51 y.o. is right-handed male with a medical history of diabetes and high cholesterol, presenting with complaints of difficulties concentrating.     He expressed increased anxiety and distress at work since March that has worsened in the past 3 weeks or so with difficulties keeping track of his work-related tasks, making frequent mistakes, poor sleep, and appetite.  He has been more \"paranoid\" and  \"hypervigilant\" about people at work. He expressed that he lost a good friend and did not know he was sick until he passed, and people started talking about it at the . Since then, he has been more preoccupied with others, \"taking care of people.\" He is listening to other people's problems; at first, he enjoyed it but later becoming more overwhelmed and not knowing when to take a break or say no. He denies feeling depressed or sad. he does not feel guilty but it is hard for him to stop thinking about others people's problems. \" I don't sleep thinking about others.\"  Things at home are good, but he worries that he is taking \"work home.\"  He has a significant weight loss (as much as 20 pounds in the past few weeks) due to a low appetite.      The patient denies having any significant history of neurological or psychiatric disease in the past. In addition, he denies any recent infectious symptoms or changes in his lifestyle.    He denies hopelessness, denies helplessness, denies suicidal ideation, denies homicidal ideation.  He denies hallucinations, both auditory and visual; he denies flat affect, he denies disorganized thoughts.      He denies periods of intense fear that are of sudden onset.    Denies symptoms of panic, including thoughts " "that they may die or shortness of breath.      He denies having periods when he hears voices and feeling that people are after him to get him or hurt him.  He denies other perceptual abnormalities.   He denies chest pain, shortness of breath, GI symptoms, tremors, sweating, or muscle weakness.        Pertinent lab results reviewed..    Psychiatric History:  Saw therapist when he was in  per recommendation of his mother. \"she was strict, and I got in some trouble at school.\"  No medications  Suicide Risk Factors:  Previous Suicide Attempts: No  Access to Firearms: No  Chronic Risk Factors: Chronic physical illness or pain  Proximal Risk Factors: Current interpersonal conflicts and Current social isolation/withdrawal  Protective Factors: Identified reasons for living, Responsibility to dependents/pets, Moral or Amish prohibitions against suicide and Problem-solving and conflict resolution skills  Current Psychiatrist: no  Past psychiatric Hospitalization: no  Medication Trials:  no  ECT trials: no    Substance Use History:  Substance use:  stop smoking when 18yo  No alcohol  No illicit drug use    Family History: History reviewed. No pertinent family history.    Social History:   Mother raised him in [place]. He has been in [place] for 17 years.   Father was killed when he was 9 months old.   Mother was strict.   Solid marriage.   He has two biological children and has raised 5 more.   He has two years of college.   Has worked at [employer], and most recently at the [employer] for 19 years.   Good friends and support of his wife.    No legal issues    Hobbies: cars  Social History     Socioeconomic History   • Marital status: Single     Spouse name: None   • Number of children: None   • Years of education: None   • Highest education level: None   Occupational History   • None   Tobacco Use   • Smoking status: Former Smoker   • Smokeless tobacco: Never Used   Substance and Sexual Activity   • Alcohol use: Yes     Comment: " rarely   • Drug use: Not Currently   • Sexual activity: None   Other Topics Concern   • None   Social History Narrative   • None     Social Determinants of Health     Financial Resource Strain:    • Difficulty of Paying Living Expenses:    Food Insecurity: No Food Insecurity   • Worried About Running Out of Food in the Last Year: Never true   • Ran Out of Food in the Last Year: Never true   Transportation Needs:    • Lack of Transportation (Medical):    • Lack of Transportation (Non-Medical):    Physical Activity:    • Days of Exercise per Week:    • Minutes of Exercise per Session:    Stress:    • Feeling of Stress :    Social Connections:    • Frequency of Communication with Friends and Family:    • Frequency of Social Gatherings with Friends and Family:    • Attends Zoroastrian Services:    • Active Member of Clubs or Organizations:    • Attends Club or Organization Meetings:    • Marital Status:    Intimate Partner Violence:    • Fear of Current or Ex-Partner:    • Emotionally Abused:    • Physically Abused:    • Sexually Abused:        Medical History:   Past Medical History:   Diagnosis Date   • Glaucoma    • Lipid disorder    • Type 2 diabetes mellitus (CMS/[facility])        Surgical History: History reviewed. No pertinent surgical history.    Allergies: No Known Allergies    Current Medications:  •  acetaminophen, 650 mg, oral, q4h PRN  •  atorvastatin, 10 mg, oral, Daily  •  glucose, 16-32 g of dextrose, oral, PRN **OR** dextrose, 15-30 g of dextrose, oral, PRN **OR** glucagon, 1 mg, intramuscular, PRN **OR** dextrose in water, 25 mL, intravenous, PRN  •  insulin aspart U-100, 3-5 Units, subcutaneous, With meals & nightly  •  latanoprost, 1 drop, Both Eyes, Nightly  •  magnesium oxide, 400 mg, oral, BID  •  melatonin, 3 mg, oral, Nightly  •  mirtazapine, 15 mg, oral, Nightly  •  NIFEdipine, 30 mg, oral, Daily  •  polyethylene glycol, 17 g, oral, Daily  •  timolol, 1 drop, Both Eyes, Daily    Home Medications:  •   "PROBIOTIC, Take 2 capsules by mouth daily.  •  glipiZIDE, Take 10 mg by mouth 2 (two) times a day with meals.    •  latanoprost, Administer 1 drop into both eyes nightly.  •  metFORMIN, Take 1,000 mg by mouth 2 (two) times a day.  •  simvastatin, Take 20 mg by mouth nightly.    •  timolol, Administer 1 drop into both eyes daily.      Review of Systems  Pertinent items are noted in HPI.    Objective     Vital Signs for the last 24 hours:  Temp:  [36.6 °C (97.8 °F)-37.2 °C (99 °F)] 37 °C (98.6 °F)  Heart Rate:  [] 84  Resp:  [16-18] 16  BP: (143-192)/() 144/85    Labs  No new labs.    Imaging  I have reviewed the Imaging from the last 24 hrs.    ECG   sinus rhythm    MENTAL STATUS EXAM  General appearance: tall AA male, well dressed, polite, no distressed, no tremor.   -Mood: \" I am okay.\"  -Affect: full range  -Speech: regular and euthymic tone. No echolalia.   -Thought process: linear, no thought blocking, No rambling.  -Thought Content: NO SI or HI. No insertion or thought withdrawal; no overvalued ideas or obsessions, no intrusive thoughts.  -Perception: None  -Insight/judgment:jeffry/fair  -Cognition: fair attention span and fund of information. He was oriented x3. Good recall.    Assessment     51 y.o. male being consulted for management recommendations of behavioral changes.   He is presenting as cooperative and coherent. He presented with GI discomfort and abdominal distension, now improved with auto-decompressed following his voluminous rectal output of gas/stool. No significant neuropathological abnormalities, EEG monitoring has revealed normal cortical activity, and MRI was WNL. Bio is significant for HTN and diabetes, good childhood, and solid relationships. He appears to be dealing with a lot at work. After that, he started to think more about life in general, committing to take better care of others, feeling overwhelmed and burned out. He meets the criteria for MDD, mild with anxiety. He does " not seem like a suicidal risk. No alhaji or psychosis. Good protective factors. He was educated about treatment and therapy models.     Impression: MDD with anxiety     Recommendation:   No need for 1:1  Consider starting Remeron 15mg PO QHS for mood and anxiety it might also help with sleep and appetite.   He was educated about therapy and was given written information.  No safety concerns at present.

## 2021-08-07 NOTE — PROGRESS NOTES
Gastroenterology  Daily Progress Note       SUBJECTIVE   Interval History:   - S/p significant flatus and BM with significant improvement in distension and abd pain  - Repeat KUB 8/6- still with distention/mild dilatation of sigmoid colon in upper abdomen  - Otherwise, no acute events overnight    Feeling well this morning, notes improving abdominal distension and passing flatus. No BM this morning, with last BM yesterday afternoon in ED. Tolerating diet without difficulty. Otherwise no nausea or vomiting.      OBJECTIVE      Vital signs in last 24 hours:  Temp:  [35.8 °C (96.4 °F)-37.2 °C (99 °F)] 36.6 °C (97.8 °F)  Heart Rate:  [] 80  Resp:  [16-20] 18  BP: (143-192)/() 143/78    Intake/Output Summary (Last 24 hours) at 8/7/2021 0625  Last data filed at 8/6/2021 1942  Gross per 24 hour   Intake 2050 ml   Output 160 ml   Net 1890 ml       PHYSICAL EXAMINATION      Physical Exam  Middle aged cachetic middle aged black male, wife at bedside  No scleral icterus, + glasses, + temporal wasting  Dry MMs, clear oropharynx  CTAB/L  RRR no m/r/g  Abdomen mildly distended, minimal tympany, mild-moderate suprapubic/lower mid-line abdominal tenderness without rebound/guarding, hypoactive bowel sounds, no ecchymoses, no CVAT  Rectal exam with heme-negative semi-liquid light brown stool  Poor muscle bulk  AO x self, circumferential, perseverative, tip-of-tongue phenomenon, unsteady normal gait   LABS / IMAGING / TELE        Labs  Results from last 7 days   Lab Units 08/06/21  1011   SODIUM mEQ/L 140   POTASSIUM mEQ/L 4.4   CHLORIDE mEQ/L 102   CO2 mEQ/L 28   BUN mg/dL 12   CREATININE mg/dL 1.2   CALCIUM mg/dL 10.0   ALBUMIN g/dL 4.0   BILIRUBIN TOTAL mg/dL 0.5   ALK PHOS IU/L 58   ALT IU/L 16   AST IU/L 17   GLUCOSE mg/dL 177*           Results from last 7 days   Lab Units 08/06/21  1011   MAGNESIUM mg/dL 1.5*     Results from last 7 days   Lab Units 08/06/21  1011   WBC K/uL 5.50   HEMOGLOBIN g/dL 14.5    HEMATOCRIT % 46.0   PLATELETS K/uL 220         Imaging  I have independently reviewed the pertinent imaging from the last 24 hrs.    ASSESSMENT AND PLAN      Sigmoid volvulus (CMS/HCC)  Assessment & Plan  51M with NIDDM2, former smoker (1ppd x4yrs, quit age 19), HLD, glaucoma, [place] descent, GERD not on long-acting acid suppression, who presents with three weeks of worsening confusion/memory/speech issues, early satiety/abd bloating, in the setting of 20lbs unintentional WL and intermittent (2x/wk) night sweats over 6 months, found to have a markedly redundant relatively distended sigmoid colon with some twisting of the mesentery on CT concerning for sigmoid volvulus.    Impression: Overall he auto-decompressed himself with a markedly improved exam following his voluminous rectal output of gas/stool in the ED, suggesting that the sigmoid volvulus was a self-limited phenomenon likely secondary to pseudo-obstruction, possibly as a paraneoplastic phenomenon or secondary effect of whatever primary hypermetabolic systemic process (intracranial/CNS vs malignancy vs infection vs autoimmune/rheumatologic) could be occurring. Clinically he remains vitally and biochemically stable without evidence of systemic toxicity, and there is minimal concern for evolving bowel ischemia at this time, especially with resolution of mild lactic acidosis following fluid resuscitation. He does merit further non-urgent colonoscopic evaluation to rule out a mechanical obstructing lesion that could have precipitated this episode of sigmoid volvulus. However, with his heme-negative stool, and more pressing neurologic / encephalopathic issues at this time, this can occur in the outpatient setting once patient has recovered from his acute phase illness.    # Possible sigmoid volvulus on CT imaging (+mesenteric swirling)  # Relative obstipation + constipation, now self-resolved  # Heme-negative, semi-liquid yellow stool  # Abnormal imaging of GI  tract   # Unintentional 20lbs WL (6mo), intermittent nondrenching night sweats (2x/wk), anorexia, early satiety, abd bloating, generalized/suprapubic pain  # Lactic acidosis, resolved with IVF    Recommendations:    - Repeat KUB 8/6 relatively unchanged and suspect lag from clinical picture, continue to monitor serial abdominal exams. No reason to repeat KUB unless clinically worsening  - Doubt metabolic etiologies given A1c 7.8 and TSH wnl (1.57)  - Clear liquid diet for now, consider advancing slowly as tolerated pending clinical course  - Continue aggressive maintenance bowel regimen - miralax 17gm or MgOx 400mg BID scheduled with senna 2 tabs PRN if no bowel movement in over 24 hours  - Encourage frequent mobilization in bed and ambulation to further promote colonic motility  - Maintain euvolemia, repleted electrolytes keep K > 4.0 and Mg . 2.0 to optimize bowel function  - Avoid anticholinergic, opioids, antimotility meds  - Would benefit from full diagnostic colonoscopy while inpatient to definitively rule out obstructing mechanical lesion as etiology of sigmoid volvulus - can occur once better optimized medically and neurologically    Patient seen and examined. Plan discussed with Gastroenterology attending, Dr. Alvarez.    Rudi Gilliam DO  Gastroenterology Fellow, PGY-IV  Pager [phone]                 VTE Assessment: Padua    Code Status: Full Code  Estimated discharge date: 8/7/2021

## 2021-08-07 NOTE — PLAN OF CARE
Pt continues to report passing flatus throughout the shift. No BM yet since admitted to the floor (LBM was at the ER prior to coming to the floor). AAOx4, calm and cooperative. however, conversation could be tangential as patient jumping from one topic to another topic. Early of the shift, pt HR was sustained in high 130s to 140s while resting on bed.  is made aware. While doing EKG, HR came down to 100s-110s without interventions. Pt denied chest pain, SOB or any other symptoms. Wife at bedside, update given to wife. All questions answered.

## 2021-08-07 NOTE — CONSULTS
"Nutrition Recommendations     1.  Clears + NCS advance to 2000 carbohydrate controlled as able.      2.  Blood sugar goal 140-180mg/dl       Nutrition Brief Assessment  Clinical Course: Patient is a 51 y.o. male who was admitted on 8/6/2021 with a diagnosis of Encephalopathy [G93.40]  Sigmoid volvulus (CMS/HCC) [K56.2]  Altered mental status, unspecified altered mental status type [R41.82].   CT on admission c/f volvulus  8/6 s/p multiple BMs    Past Medical History:   Diagnosis Date   • Glaucoma    • Lipid disorder    • Type 2 diabetes mellitus (CMS/HCC)      History reviewed. No pertinent surgical history.    Reason for Assessment  Reason For Assessment: identified at risk by screening criteria     MST Nutrition Screen Tool  Has patient lost weight without trying?: 0-->Yes  If yes,how much weight has been lost?: 3-->24-33 pounds  Has patient been eating poorly due to decreased appetite?: 1-->Yes  MST Nutrition Screen Score: 4          Physical Findings  Last Bowel Movement: 08/06/21     [id number] Physical Appearance  Last Bowel Movement: 08/06/21     Nutrition Order  Nutrition Order: does not meet nutritional requirements  Nutrition Order Comments: clears     Anthropometrics  Height: 177.8 cm (5' 10\")           Current Weight  Weight Method: Bed scale  Weight: 66.7 kg (147 lb 1.6 oz)     Ideal Body Weight (IBW)  Ideal Body Weight (IBW) (kg): 76.48  % Ideal Body Weight: 87.24            Body Mass Index (BMI)  BMI (Calculated): 21.1                     BMP Results       08/07/21 08/06/21                       0608 1011           140          K 3.9 4.4          Cl 103 102          CO2 24 28          Glucose 137 177          BUN 6 12          Creatinine 1.1 1.2          Calcium 8.8 10.0          Anion Gap 12 10          EGFR >60.0 >60.0          Comment for K at 1011 on 08/06/21: Results obtained on plasma. Plasma Potassium values may be up to 0.4 mEQ/L less than serum values. The differences may be greater for " patients with high platelet or white cell counts.               Medications  Pertinent Medications Reviewed: reviewed, pertinent   • atorvastatin  10 mg oral Daily   • [START ON 8/8/2021] cyanocobalamin  1,000 mcg intramuscular Once   • insulin aspart U-100  3-5 Units subcutaneous With meals & nightly   • latanoprost  1 drop Both Eyes Nightly   • magnesium oxide  400 mg oral BID   • melatonin  3 mg oral Nightly   • mirtazapine  15 mg oral Nightly   • NIFEdipine  30 mg oral Daily   • polyethylene glycol  17 g oral Daily   • timolol  1 drop Both Eyes Daily           MST 2/2 weight loss presumably 2/2 below.  Noted increased anxiety/stress r/t work and personal life.  Per Psych, no suicidal ideologies.    Goals:  Oral intake > 75% est needs      Recommendations: See above       Date: 08/07/21  Signature: Katelin Hurd RD

## 2021-08-08 PROBLEM — R41.82 ALTERED MENTAL STATUS: Status: ACTIVE | Noted: 2021-08-08

## 2021-08-08 LAB
ANION GAP SERPL CALC-SCNC: 14 MEQ/L (ref 3–15)
BUN SERPL-MCNC: <5 MG/DL (ref 8–20)
CALCIUM SERPL-MCNC: 9.5 MG/DL (ref 8.9–10.3)
CHLORIDE SERPL-SCNC: 100 MEQ/L (ref 98–109)
CO2 SERPL-SCNC: 23 MEQ/L (ref 22–32)
CREAT SERPL-MCNC: 1.1 MG/DL (ref 0.8–1.3)
ERYTHROCYTE [DISTWIDTH] IN BLOOD BY AUTOMATED COUNT: 12.6 % (ref 11.6–14.4)
GFR SERPL CREATININE-BSD FRML MDRD: >60 ML/MIN/1.73M*2
GLUCOSE BLD-MCNC: 154 MG/DL (ref 70–99)
GLUCOSE BLD-MCNC: 271 MG/DL (ref 70–99)
GLUCOSE BLD-MCNC: 326 MG/DL (ref 70–99)
GLUCOSE BLD-MCNC: 415 MG/DL (ref 70–99)
GLUCOSE BLD-MCNC: 419 MG/DL (ref 70–99)
GLUCOSE SERPL-MCNC: 318 MG/DL (ref 70–99)
HCT VFR BLDCO AUTO: 51.2 % (ref 40.1–51)
HGB BLD-MCNC: 16.1 G/DL (ref 13.7–17.5)
MCH RBC QN AUTO: 28 PG (ref 28–33.2)
MCHC RBC AUTO-ENTMCNC: 31.4 G/DL (ref 32.2–36.5)
MCV RBC AUTO: 89.2 FL (ref 83–98)
PDW BLD AUTO: 10 FL (ref 9.4–12.4)
PLATELET # BLD AUTO: 191 K/UL (ref 150–350)
POCT TEST: ABNORMAL
POTASSIUM SERPL-SCNC: 4 MEQ/L (ref 3.6–5.1)
RBC # BLD AUTO: 5.74 M/UL (ref 4.5–5.8)
SODIUM SERPL-SCNC: 137 MEQ/L (ref 136–144)
WBC # BLD AUTO: 5.51 K/UL (ref 3.8–10.5)

## 2021-08-08 PROCEDURE — 80048 BASIC METABOLIC PNL TOTAL CA: CPT | Performed by: STUDENT IN AN ORGANIZED HEALTH CARE EDUCATION/TRAINING PROGRAM

## 2021-08-08 PROCEDURE — 96372 THER/PROPH/DIAG INJ SC/IM: CPT

## 2021-08-08 PROCEDURE — 96372 THER/PROPH/DIAG INJ SC/IM: CPT | Mod: 59 | Performed by: INTERNAL MEDICINE

## 2021-08-08 PROCEDURE — 63600000 HC DRUGS/DETAIL CODE: Performed by: STUDENT IN AN ORGANIZED HEALTH CARE EDUCATION/TRAINING PROGRAM

## 2021-08-08 PROCEDURE — 63700000 HC SELF-ADMINISTRABLE DRUG: Performed by: STUDENT IN AN ORGANIZED HEALTH CARE EDUCATION/TRAINING PROGRAM

## 2021-08-08 PROCEDURE — 99233 SBSQ HOSP IP/OBS HIGH 50: CPT | Performed by: STUDENT IN AN ORGANIZED HEALTH CARE EDUCATION/TRAINING PROGRAM

## 2021-08-08 PROCEDURE — 85027 COMPLETE CBC AUTOMATED: CPT | Performed by: STUDENT IN AN ORGANIZED HEALTH CARE EDUCATION/TRAINING PROGRAM

## 2021-08-08 PROCEDURE — G0378 HOSPITAL OBSERVATION PER HR: HCPCS

## 2021-08-08 PROCEDURE — 63600000 HC DRUGS/DETAIL CODE: Performed by: PSYCHIATRY & NEUROLOGY

## 2021-08-08 PROCEDURE — 36415 COLL VENOUS BLD VENIPUNCTURE: CPT | Performed by: STUDENT IN AN ORGANIZED HEALTH CARE EDUCATION/TRAINING PROGRAM

## 2021-08-08 PROCEDURE — 21400000 HC ROOM AND CARE CCU/INTERMEDIATE

## 2021-08-08 RX ORDER — INSULIN ASPART 100 [IU]/ML
3-5 INJECTION, SOLUTION INTRAVENOUS; SUBCUTANEOUS
Status: DISCONTINUED | OUTPATIENT
Start: 2021-08-08 | End: 2021-08-10 | Stop reason: HOSPADM

## 2021-08-08 RX ORDER — POLYETHYLENE GLYCOL 3350 17 G/17G
238 POWDER, FOR SOLUTION ORAL ONCE
Status: COMPLETED | OUTPATIENT
Start: 2021-08-08 | End: 2021-08-08

## 2021-08-08 RX ORDER — DEXTROSE 50 % IN WATER (D50W) INTRAVENOUS SYRINGE
25 AS NEEDED
Status: DISCONTINUED | OUTPATIENT
Start: 2021-08-08 | End: 2021-08-10 | Stop reason: HOSPADM

## 2021-08-08 RX ORDER — INSULIN ASPART 100 [IU]/ML
6-10 INJECTION, SOLUTION INTRAVENOUS; SUBCUTANEOUS
Status: DISCONTINUED | OUTPATIENT
Start: 2021-08-08 | End: 2021-08-08

## 2021-08-08 RX ORDER — POLYETHYLENE GLYCOL 3350 17 G/17G
119 POWDER, FOR SOLUTION ORAL ONCE
Status: ACTIVE | OUTPATIENT
Start: 2021-08-10 | End: 2021-08-10

## 2021-08-08 RX ORDER — IBUPROFEN 200 MG
16-32 TABLET ORAL AS NEEDED
Status: DISCONTINUED | OUTPATIENT
Start: 2021-08-08 | End: 2021-08-10 | Stop reason: HOSPADM

## 2021-08-08 RX ORDER — POLYETHYLENE GLYCOL 3350 17 G/17G
238 POWDER, FOR SOLUTION ORAL ONCE
Status: COMPLETED | OUTPATIENT
Start: 2021-08-09 | End: 2021-08-09

## 2021-08-08 RX ORDER — INSULIN GLARGINE 100 [IU]/ML
10 INJECTION, SOLUTION SUBCUTANEOUS NIGHTLY
Status: DISCONTINUED | OUTPATIENT
Start: 2021-08-08 | End: 2021-08-10 | Stop reason: HOSPADM

## 2021-08-08 RX ORDER — DEXTROSE 40 %
15-30 GEL (GRAM) ORAL AS NEEDED
Status: DISCONTINUED | OUTPATIENT
Start: 2021-08-08 | End: 2021-08-10 | Stop reason: HOSPADM

## 2021-08-08 RX ORDER — BISACODYL 5 MG
20 TABLET, DELAYED RELEASE (ENTERIC COATED) ORAL ONCE
Status: COMPLETED | OUTPATIENT
Start: 2021-08-09 | End: 2021-08-09

## 2021-08-08 RX ORDER — BISACODYL 5 MG
20 TABLET, DELAYED RELEASE (ENTERIC COATED) ORAL ONCE
Status: DISCONTINUED | OUTPATIENT
Start: 2021-08-08 | End: 2021-08-08

## 2021-08-08 RX ORDER — BISACODYL 5 MG
20 TABLET, DELAYED RELEASE (ENTERIC COATED) ORAL ONCE
Status: COMPLETED | OUTPATIENT
Start: 2021-08-08 | End: 2021-08-08

## 2021-08-08 RX ADMIN — BISACODYL 20 MG: 5 TABLET, COATED ORAL at 15:30

## 2021-08-08 RX ADMIN — CYANOCOBALAMIN 1000 MCG: 1000 INJECTION, SOLUTION INTRAMUSCULAR; SUBCUTANEOUS at 08:53

## 2021-08-08 RX ADMIN — MIRTAZAPINE 15 MG: 15 TABLET, FILM COATED ORAL at 21:39

## 2021-08-08 RX ADMIN — MAGNESIUM OXIDE TAB 400 MG (241.3 MG ELEMENTAL MG) 400 MG: 400 (241.3 MG) TAB at 08:53

## 2021-08-08 RX ADMIN — INSULIN ASPART 10 UNITS: 100 INJECTION, SOLUTION INTRAVENOUS; SUBCUTANEOUS at 18:17

## 2021-08-08 RX ADMIN — TIMOLOL MALEATE 1 DROP: 5 SOLUTION/ DROPS OPHTHALMIC at 08:52

## 2021-08-08 RX ADMIN — ATORVASTATIN CALCIUM 10 MG: 10 TABLET, FILM COATED ORAL at 08:53

## 2021-08-08 RX ADMIN — LATANOPROST 1 DROP: 50 SOLUTION/ DROPS OPHTHALMIC at 21:39

## 2021-08-08 RX ADMIN — POLYETHYLENE GLYCOL 3350 238 G: 17 POWDER, FOR SOLUTION ORAL at 15:33

## 2021-08-08 RX ADMIN — INSULIN ASPART 5 UNITS: 100 INJECTION, SOLUTION INTRAVENOUS; SUBCUTANEOUS at 23:21

## 2021-08-08 RX ADMIN — MAGNESIUM OXIDE TAB 400 MG (241.3 MG ELEMENTAL MG) 400 MG: 400 (241.3 MG) TAB at 19:56

## 2021-08-08 RX ADMIN — Medication 3 MG: at 21:39

## 2021-08-08 RX ADMIN — NIFEDIPINE 30 MG: 30 TABLET, FILM COATED, EXTENDED RELEASE ORAL at 08:54

## 2021-08-08 RX ADMIN — INSULIN ASPART 3 UNITS: 100 INJECTION, SOLUTION INTRAVENOUS; SUBCUTANEOUS at 13:11

## 2021-08-08 RX ADMIN — INSULIN GLARGINE 10 UNITS: 100 INJECTION, SOLUTION SUBCUTANEOUS at 23:21

## 2021-08-08 RX ADMIN — POLYETHYLENE GLYCOL 3350 17 G: 17 POWDER, FOR SOLUTION ORAL at 08:56

## 2021-08-08 NOTE — PROGRESS NOTES
"   Hospital Medicine Service -  Daily Progress Note       SUBJECTIVE   Interval History: NAEO. Pt took mirtazapine last night and he says that it really helped him. He was able to get a full night's sleep for the first time in weeks and said his thinking this morning was completely clear and he felt totally himself. Later in the morning, he had some \"fuzziness\" in his memory, not able to remember certain details about his conversations with providers yesterday which greatly distressed him due to concerns that he is getting worse again. Pt had a lot of anxiety and tearfulness per nursing. I gave reassurance. His speech remains clear and his thoughts are linear. Does not go on tangents like he did during initial encounter. He is able to recount full details of his day yesterday, which doctors he met with, and what he discussed with each of them. Pt admits that he does tend to over-analyze things which is part of his personality. I updated his as well this afternoon.      OBJECTIVE      Vital signs in last 24 hours:  Temp:  [36.5 °C (97.7 °F)-37.4 °C (99.3 °F)] 37.4 °C (99.3 °F)  Heart Rate:  [] 81  Resp:  [18-20] 20  BP: (126-169)/(78-98) 169/98    Intake/Output Summary (Last 24 hours) at 8/8/2021 1507  Last data filed at 8/8/2021 0900  Gross per 24 hour   Intake 660 ml   Output --   Net 660 ml       PHYSICAL EXAMINATION      Physical Exam  Vitals and nursing note reviewed.   Constitutional:       General: He is not in acute distress.     Appearance: He is well-developed.   HENT:      Head: Normocephalic and atraumatic.   Neck:      Vascular: No JVD.   Cardiovascular:      Rate and Rhythm: Normal rate and regular rhythm.      Heart sounds: No murmur heard.     Pulmonary:      Effort: Pulmonary effort is normal.      Breath sounds: No wheezing or rales.   Abdominal:      General: Bowel sounds are normal.      Palpations: Abdomen is soft.      Tenderness: There is no abdominal tenderness.   Musculoskeletal:      " "Cervical back: Neck supple.   Skin:     General: Skin is warm and dry.      Findings: No rash.   Neurological:      Mental Status: He is alert and oriented to person, place, and time.   Psychiatric:         Mood and Affect: Mood normal.            LINES, CATHETERS, DRAINS, AIRWAYS, AND WOUNDS   Lines, Drains, and Airways:  Wounds (agree with documentation and present on admission):  Peripheral IV (Adult) 08/06/21 Anterior;Left Forearm (Active)   Number of days: 2     Comments:      LABS / IMAGING / TELE      Labs  I have reviewed the patient's labs to the time of note. No new clinical concern.    SARS-CoV-2 (COVID-19) (no units)   Date/Time Value   08/06/2021 1111 Negative       Imaging  I have independently reviewed the pertinent imaging from the last 24 hrs.    ECG/Telemetry  I have independently reviewed the telemetry. No events for the last 24 hours.    ASSESSMENT AND PLAN      * Encephalopathy  Assessment & Plan  Rapid progression of symptoms which began immediately after learning of the death of a coworker and the serious injury of another coworker  -based on timing of events, tangential thinking/significant insomnia/paranoia, lack of focal deficits, and no clear metabolic inciting factor, suspicion for psychiatric etiology is high   -UA without signs of infection. TSH WNL.   -EEG is WNL   -check vitamin B12/folate. B12 is equivocal - check MMA  -ESR/CRP WNL  -pt only drinks very rarely so wernicke's is low on ddx   -UDS negative   -CT without acute findings   -melatonin for sleep/wake cycle  -MRI without acute findings  -Patient is improving, now with linear thought process, improved memory, able to recount full details of his day yesterday. Patient admits to hyper-analyzing his symptoms. Said there were a few details of conversations from yesterday that seem \"fuzzy\" to him and due to that, he had anxiety attack and tearfulness. Needs continued reassurance.   -Discussed with neuro. Given clinical improvement " and low suspicion for organic cause, hold off on further workup at this time.   -To consider LP/paraneoplastic workup if his symptoms relapse but at present his continues to improve.   -Appreciate psych recs - recommended Remeron 15 mg nightly. They feel pt has mild to moderate depression and would benefit from outpatient therapy    Sigmoid volvulus (CMS/HCC)  Assessment & Plan  Quesionable volvulus on CT in ED. Pt with only mild abdominal pain. Subsequently had large bowel movement with gas in the ED after the CT was performed. F/u abd Xray without significant change  -appreciate GI recs - no need for urgent intervention. Recommending inpatient colonoscopy to definitively r/o obstructing mass. Plan for 2 day prep and colonoscopy on 8/10  -clear liquid diet    Essential hypertension  Assessment & Plan  Not on home meds for this  May be partially anxiety mediated  Improving now on nifedipine started here    Glaucoma  Assessment & Plan  Vision is stable   C/w home eye drops    Type 2 diabetes mellitus without complication, without long-term current use of insulin (CMS/[facility])  Assessment & Plan  A1c 7.8  Home regimen is metformin and glipizide - hold as inpatient and restart at time of discharge   ISS while inpatient     VTE Assessment: Padua    VTE Prophylaxis:  Current anticoagulants:    •None      Code Status: Full Code      Estimated Discharge Date: 8/10/2021   Disposition Planning: pending clinical course     Lesli Aguilar MD  8/8/2021

## 2021-08-08 NOTE — PROGRESS NOTES
Gastroenterology  Daily Progress Note       SUBJECTIVE   Interval History:   - No acute events overnight  - MRI Brain w/ and w/out Contrast (8/7)- no intracranial mass or acute abnormality  - Neuro symptoms felt to be neuro-psych related, now improving    Feeling well, notes decreased abdominal distension. Passing flatus, and notes one small BM yesterday. No nausea or vomiting. Continues to deny any abdominal pain.         OBJECTIVE      Vital signs in last 24 hours:  Temp:  [36.7 °C (98.1 °F)-37.2 °C (99 °F)] 36.7 °C (98.1 °F)  Heart Rate:  [] 81  Resp:  [16-20] 18  BP: (136-157)/(76-85) 136/79  No intake or output data in the 24 hours ending 08/08/21 0614    PHYSICAL EXAMINATION      Physical Exam  Middle aged cachetic middle aged black male, wife at bedside  No scleral icterus, + glasses, + temporal wasting  Dry MMs, clear oropharynx  CTAB/L  RRR no m/r/g  Abdomen mildly distended, minimal tympany, mild-moderate suprapubic/lower mid-line abdominal tenderness without rebound/guarding, hypoactive bowel sounds, no ecchymoses, no CVAT  Rectal exam with heme-negative semi-liquid light brown stool  Poor muscle bulk  AO x self, circumferential, perseverative, tip-of-tongue phenomenon, unsteady normal gait   LABS / IMAGING / TELE        Labs  Results from last 7 days   Lab Units 08/07/21  0608 08/06/21  1011   SODIUM mEQ/L 139 140   POTASSIUM mEQ/L 3.9 4.4   CHLORIDE mEQ/L 103 102   CO2 mEQ/L 24 28   BUN mg/dL 6* 12   CREATININE mg/dL 1.1 1.2   CALCIUM mg/dL 8.8* 10.0   ALBUMIN g/dL 3.4 4.0   BILIRUBIN TOTAL mg/dL 0.9 0.5   ALK PHOS IU/L 52 58   ALT IU/L 12* 16   AST IU/L 13* 17   GLUCOSE mg/dL 137* 177*           Results from last 7 days   Lab Units 08/06/21  1011   MAGNESIUM mg/dL 1.5*     Results from last 7 days   Lab Units 08/07/21  0608 08/06/21  1011   WBC K/uL 5.31 5.50   HEMOGLOBIN g/dL 13.5* 14.5   HEMATOCRIT % 41.3 46.0   PLATELETS K/uL 189 220         Imaging  I have independently reviewed the  I sent rx for cozaar   pertinent imaging from the last 24 hrs.    ASSESSMENT AND PLAN      Sigmoid volvulus (CMS/HCC)  Assessment & Plan  51M with NIDDM2, former smoker (1ppd x4yrs, quit age 19), HLD, glaucoma, [place] descent, GERD not on long-acting acid suppression, who presents with three weeks of worsening confusion/memory/speech issues, early satiety/abd bloating, in the setting of 20lbs unintentional WL and intermittent (2x/wk) night sweats over 6 months, found to have a markedly redundant relatively distended sigmoid colon with some twisting of the mesentery on CT concerning for sigmoid volvulus.    Impression: Overall he auto-decompressed himself with a markedly improved exam following his voluminous rectal output of gas/stool in the ED, suggesting that the sigmoid volvulus was a self-limited phenomenon likely secondary to pseudo-obstruction, possibly as a paraneoplastic phenomenon or secondary effect of whatever primary hypermetabolic systemic process (intracranial/CNS vs malignancy vs infection vs autoimmune/rheumatologic) could be occurring. Clinically he remains vitally and biochemically stable without evidence of systemic toxicity, and there is minimal concern for evolving bowel ischemia at this time, especially with resolution of mild lactic acidosis following fluid resuscitation. He does merit further non-urgent colonoscopic evaluation to rule out a mechanical obstructing lesion that could have precipitated this episode of sigmoid volvulus. However, with his heme-negative stool, and more pressing neurologic / encephalopathic issues at this time, this can occur in the outpatient setting once patient has recovered from his acute phase illness.    # Possible sigmoid volvulus on CT imaging (+mesenteric swirling)  # Relative obstipation + constipation, now self-resolved  # Heme-negative, semi-liquid yellow stool  # Abnormal imaging of GI tract   # Unintentional 20lbs WL (6mo), intermittent nondrenching night sweats (2x/wk),  anorexia, early satiety, abd bloating, generalized/suprapubic pain  # Lactic acidosis, resolved with IVF    Recommendations:    - Doubt metabolic etiologies given A1c 7.8 and TSH wnl (1.57)  - Continue CLD for now  - Would benefit from full diagnostic colonoscopy while inpatient to definitively rule out obstructing mechanical lesion as etiology of sigmoid volvulus given improvement in mental status (felt 2/2 psych-related)  - Will start 2-day bowel prep given previous c/f volvulus with significant stool burden on 8/8 and 8/9 with miralax and dulcolax for planned colonoscopy on 8/10  - Keep NPO at MN on 8/9  - Encourage frequent mobilization in bed and ambulation to further promote colonic motility  - Maintain euvolemia, repleted electrolytes keep K > 4.0 and Mg . 2.0 to optimize bowel function  - Avoid anticholinergic, opioids, antimotility meds      Patient seen and examined. Plan discussed with Gastroenterology attending, Dr. Alvarez.    Rudi Gilliam DO  Gastroenterology Fellow, PGY-IV  Pager [phone]                 VTE Assessment: Padua    Code Status: Full Code  Estimated discharge date: 8/7/2021

## 2021-08-08 NOTE — PLAN OF CARE
"  Problem: Adult Inpatient Plan of Care  Goal: Patient-Specific Goal (Individualized)  Outcome: Progressing  Flowsheets (Taken 8/8/2021 1821)  Patient-Specific Goals (Include Timeframe): colonoscopy tomorrow  Individualized Care Needs: bowel prep, room dim, quiet environment  Anxieties, Fears or Concerns: unable to sleep    Bowel prep started. Pt starting to have BM. Wife came at bedside. Update provided to her.  Had an episode of mentality breakdown,He stated he has\"fuzziness\" in his memory and not able to get his thoughts together. Pt started to crying and stating he's frustrating and dont know what to do. Eventually became restless. Contacted the primary team.  came at bedside for assessment. Dinner time BS spiked to 412. Primary team informed.  ordered 10 units of insulin to administer to patient.  "

## 2021-08-08 NOTE — PLAN OF CARE
Plan of Care Review  Plan of Care Reviewed With: patient  Progress: improving  Outcome Summary: Pt feeling much improved, states he feels more like himself. AAOx4. D/.C within 48 hours

## 2021-08-09 LAB
ANION GAP SERPL CALC-SCNC: 11 MEQ/L (ref 3–15)
BUN SERPL-MCNC: <5 MG/DL (ref 8–20)
CALCIUM SERPL-MCNC: 9.7 MG/DL (ref 8.9–10.3)
CHLORIDE SERPL-SCNC: 100 MEQ/L (ref 98–109)
CO2 SERPL-SCNC: 28 MEQ/L (ref 22–32)
CREAT SERPL-MCNC: 1.1 MG/DL (ref 0.8–1.3)
GFR SERPL CREATININE-BSD FRML MDRD: >60 ML/MIN/1.73M*2
GLUCOSE BLD-MCNC: 158 MG/DL (ref 70–99)
GLUCOSE BLD-MCNC: 218 MG/DL (ref 70–99)
GLUCOSE BLD-MCNC: 258 MG/DL (ref 70–99)
GLUCOSE BLD-MCNC: 273 MG/DL (ref 70–99)
GLUCOSE SERPL-MCNC: 176 MG/DL (ref 70–99)
POCT TEST: ABNORMAL
POTASSIUM SERPL-SCNC: 4.1 MEQ/L (ref 3.6–5.1)
SODIUM SERPL-SCNC: 139 MEQ/L (ref 136–144)

## 2021-08-09 PROCEDURE — 96372 THER/PROPH/DIAG INJ SC/IM: CPT

## 2021-08-09 PROCEDURE — 63700000 HC SELF-ADMINISTRABLE DRUG: Performed by: STUDENT IN AN ORGANIZED HEALTH CARE EDUCATION/TRAINING PROGRAM

## 2021-08-09 PROCEDURE — 99233 SBSQ HOSP IP/OBS HIGH 50: CPT | Performed by: INTERNAL MEDICINE

## 2021-08-09 PROCEDURE — 80048 BASIC METABOLIC PNL TOTAL CA: CPT | Performed by: STUDENT IN AN ORGANIZED HEALTH CARE EDUCATION/TRAINING PROGRAM

## 2021-08-09 PROCEDURE — 36415 COLL VENOUS BLD VENIPUNCTURE: CPT | Performed by: STUDENT IN AN ORGANIZED HEALTH CARE EDUCATION/TRAINING PROGRAM

## 2021-08-09 PROCEDURE — 21400000 HC ROOM AND CARE CCU/INTERMEDIATE

## 2021-08-09 PROCEDURE — G0378 HOSPITAL OBSERVATION PER HR: HCPCS

## 2021-08-09 RX ADMIN — INSULIN ASPART 3 UNITS: 100 INJECTION, SOLUTION INTRAVENOUS; SUBCUTANEOUS at 12:00

## 2021-08-09 RX ADMIN — INSULIN GLARGINE 10 UNITS: 100 INJECTION, SOLUTION SUBCUTANEOUS at 22:06

## 2021-08-09 RX ADMIN — TIMOLOL MALEATE 1 DROP: 5 SOLUTION/ DROPS OPHTHALMIC at 08:50

## 2021-08-09 RX ADMIN — BISACODYL 20 MG: 5 TABLET, COATED ORAL at 15:50

## 2021-08-09 RX ADMIN — ATORVASTATIN CALCIUM 10 MG: 10 TABLET, FILM COATED ORAL at 08:49

## 2021-08-09 RX ADMIN — LATANOPROST 1 DROP: 50 SOLUTION/ DROPS OPHTHALMIC at 22:07

## 2021-08-09 RX ADMIN — INSULIN ASPART 3 UNITS: 100 INJECTION, SOLUTION INTRAVENOUS; SUBCUTANEOUS at 22:05

## 2021-08-09 RX ADMIN — MIRTAZAPINE 15 MG: 15 TABLET, FILM COATED ORAL at 22:07

## 2021-08-09 RX ADMIN — MAGNESIUM OXIDE TAB 400 MG (241.3 MG ELEMENTAL MG) 400 MG: 400 (241.3 MG) TAB at 20:59

## 2021-08-09 RX ADMIN — POLYETHYLENE GLYCOL 3350 238 G: 17 POWDER, FOR SOLUTION ORAL at 15:51

## 2021-08-09 RX ADMIN — INSULIN ASPART 3 UNITS: 100 INJECTION, SOLUTION INTRAVENOUS; SUBCUTANEOUS at 17:45

## 2021-08-09 RX ADMIN — MAGNESIUM OXIDE TAB 400 MG (241.3 MG ELEMENTAL MG) 400 MG: 400 (241.3 MG) TAB at 08:49

## 2021-08-09 RX ADMIN — NIFEDIPINE 30 MG: 30 TABLET, FILM COATED, EXTENDED RELEASE ORAL at 08:49

## 2021-08-09 RX ADMIN — Medication 3 MG: at 22:07

## 2021-08-09 NOTE — PLAN OF CARE
Plan of Care Review  Outcome Summary: Patient is admitted with Encephalopathy/Sigmoid volvulus with plan for colonoscopy tomorrow. ERICA-8/10 with no needs.

## 2021-08-09 NOTE — PATIENT CARE CONFERENCE
Care Progression Rounds Note  Date: 8/9/2021  Time: 11:36 AM     Patient Name: Stephane Kirby     Medical Record Number: [id number]   YOB: 1970  Sex: Male      Room/Bed: 0148    Admitting Diagnosis: Encephalopathy [G93.40]  Sigmoid volvulus (CMS/HCC) [K56.2]  Altered mental status, unspecified altered mental status type [R41.82]   Admit Date/Time: 8/6/2021  9:21 AM    Primary Diagnosis: Encephalopathy  Principal Problem: Encephalopathy    GMLOS: pending  Anticipated Discharge Date: 8/11/2021    AM-PAC:  Mobility Score: 24    Discharge Planning:  Anticipated Discharge Disposition:  (home with family)    Barriers to Discharge:  Barriers to Discharge: Medical issues not resolved, Test pending    Participants:  nursing, physician, physical therapy, social work/services

## 2021-08-09 NOTE — PROGRESS NOTES
Gastroenterology  Daily Progress Note       SUBJECTIVE   Interval History:   Tolerated bowel prep without issue. Still with semisoft brown stools. Denies abdominal pain.     OBJECTIVE      Vital signs in last 24 hours:  Temp:  [36.3 °C (97.4 °F)-37.1 °C (98.7 °F)] 36.3 °C (97.4 °F)  Heart Rate:  [] 98  Resp:  [16-18] 16  BP: (126-154)/(78-90) 139/80  No intake or output data in the 24 hours ending 08/09/21 1101    PHYSICAL EXAMINATION      Physical Exam  Constitutional:       General: He is not in acute distress.  Cardiovascular:      Rate and Rhythm: Normal rate.      Heart sounds: No murmur heard.     Pulmonary:      Effort: Pulmonary effort is normal. No respiratory distress.   Abdominal:      General: Bowel sounds are normal. There is no distension.      Palpations: Abdomen is soft.      Tenderness: There is no abdominal tenderness.   Neurological:      Mental Status: He is alert and oriented to person, place, and time.          LABS / IMAGING / TELE        Labs  Results from last 7 days   Lab Units 08/09/21  0726 08/08/21 0928 08/07/21  0608 08/06/21  1011 08/06/21  1011   SODIUM mEQ/L 139 137 139   < > 140   POTASSIUM mEQ/L 4.1 4.0 3.9   < > 4.4   CHLORIDE mEQ/L 100 100 103   < > 102   CO2 mEQ/L 28 23 24   < > 28   BUN mg/dL <5* <5* 6*   < > 12   CREATININE mg/dL 1.1 1.1 1.1   < > 1.2   CALCIUM mg/dL 9.7 9.5 8.8*   < > 10.0   ALBUMIN g/dL  --   --  3.4  --  4.0   BILIRUBIN TOTAL mg/dL  --   --  0.9  --  0.5   ALK PHOS IU/L  --   --  52  --  58   ALT IU/L  --   --  12*  --  16   AST IU/L  --   --  13*  --  17   GLUCOSE mg/dL 176* 318* 137*   < > 177*    < > = values in this interval not displayed.           Results from last 7 days   Lab Units 08/06/21  1011   MAGNESIUM mg/dL 1.5*     Results from last 7 days   Lab Units 08/08/21 0928 08/07/21  0608 08/06/21  1011   WBC K/uL 5.51 5.31 5.50   HEMOGLOBIN g/dL 16.1 13.5* 14.5   HEMATOCRIT % 51.2* 41.3 46.0   PLATELETS K/uL 191 189 220         Imaging  I  have independently reviewed the pertinent imaging from the last 24 hrs.    ASSESSMENT AND PLAN      Sigmoid volvulus (CMS/HCC)  Assessment & Plan  51M with NIDDM2, former smoker (1ppd x4yrs, quit age 19), HLD, glaucoma, [place] descent, GERD not on long-acting acid suppression, who presents with three weeks of worsening confusion/memory/speech issues, early satiety/abd bloating, in the setting of 20lbs unintentional WL and intermittent (2x/wk) night sweats over 6 months, found to have a markedly redundant relatively distended sigmoid colon with some twisting of the mesentery on CT concerning for sigmoid volvulus.    # Possible sigmoid volvulus on CT imaging (+mesenteric swirling)  # Relative obstipation + constipation, now self-resolved  # Heme-negative, semi-liquid yellow stool  # Abnormal imaging of GI tract   # Unintentional 20lbs WL (6mo), intermittent nondrenching night sweats (2x/wk), anorexia, early satiety, abd bloating, generalized/suprapubic pain  # Lactic acidosis, resolved with IVF    Impression: Overall he auto-decompressed himself with a markedly improved exam following his voluminous rectal output of gas/stool in the ED, suggesting that the sigmoid volvulus was a self-limited phenomenon likely secondary to pseudo-obstruction, possibly as a paraneoplastic phenomenon or secondary effect of whatever primary hypermetabolic systemic process (intracranial/CNS vs malignancy vs infection vs autoimmune/rheumatologic) could be occurring. Clinically he remains vitally and biochemically stable without evidence of systemic toxicity, and there is minimal concern for evolving bowel ischemia at this time, especially with resolution of mild lactic acidosis following fluid resuscitation. He does merit further non-urgent colonoscopic evaluation to rule out a mechanical obstructing lesion that could have precipitated this episode of sigmoid volvulus. However, with his heme-negative stool, and more pressing neurologic /  encephalopathic issues at this time, this can occur in the outpatient setting once patient has recovered from his acute phase illness.    Recommendations:  -CLD  - Bowel purge today for colonoscopy tomorrow  -Colonoscopy 8/20   -NPO MN   -Hgb >8, Plt >50, INR <1.5   -Ensure glucose is well controlled  - Avoid anticholinergic, opioids, antimotility meds               VTE Assessment: Padua    Code Status: Full Code  Estimated discharge date: 8/10/2021

## 2021-08-09 NOTE — PLAN OF CARE
Problem: Adult Inpatient Plan of Care  Goal: Readiness for Transition of Care  Intervention: Mutually Develop Transition Plan  Flowsheets (Taken 8/9/2021 1056)  Anticipated Discharge Disposition: (home with family) --  Equipment Needed After Discharge: none  Discharge Coordination/Progress: SW assessed pt via room phone. Pt states he reside with wife in 3 story home (basement, 1st fl and 2nd fl), full flight of stair to each level, powder room on 1st floor, tub in 2nd michael bathroom, 2-3 ROHINI home. Pt denies DME, home health services and past short term rehab stay. Pt is independent with ADLs. Pt confirmed wife, Edna ([phone]) as emergency contact, PCP Maynor Sanders,  and preferred pharmacy is "[facility] on [address] [facility] and [place]. Pt stated he received Pfizer covid shot in March and April 2021. Pt stated wife would provide transport home upon d/c.   Assistive Device/Animal Currently Used at Home: none  Anticipated Changes Related to Illness: none  Concerns Comments: Pt admitted displaying change in personality and behavior for past 2 -3 weeks. Dx: Encephalopathy. Pt evaluated by Psychiatry on 8/7, prescribed Remeron 15 mg PO QHS for anxiety and given outpatient therapy information.   Transportation Concerns: car, none  Readmission Within the Last 30 Days: no previous admission in last 30 days  Patient/Family Anticipated Services at Transition: none  Patient/Family Anticipates Transition to:  • home  • home with family  Transportation Anticipated: family or friend will provide  Concerns to be Addressed:  • no discharge needs identified  • denies needs/concerns at this time  Patient's Choice of Community Agency(s): no community needs  Offered/Gave Vendor List: no    Per medical rounds, pt to have colonoscopy tomorrow. Pt to be d/c home with no needs. ERICA-8/10. SW to remain available for emotional support and disposition planning. ABLERTO Sherman

## 2021-08-09 NOTE — PROGRESS NOTES
Hospital Medicine Service -  Daily Progress Note       SUBJECTIVE   Patient seen and examined along with GI Dr. Mike Noe. Pt is resting in bed in Room 148. Patient reports he is taking the bowel prep for his colo tomorrow. Pt is having a lot of BM. Denies denies abdominal pain.   Pt took clear diet for breakfast without any nausea.   Pt started Remeron 2 days ago, aware this is for treatment of depression and anxiety. Pt states his sleep is improved , also taking melatonin.   Per GI, plan for colo tomorrow, unknown what time he is scheduled. If colo clear, anticipate ok for discharge to home.     Case discussed with: GI.  Attending.      OBJECTIVE      Vital signs in last 24 hours:  Temp:  [36.3 °C (97.4 °F)-37.4 °C (99.3 °F)] 36.3 °C (97.4 °F)  Heart Rate:  [] 98  Resp:  [16-20] 16  BP: (126-169)/(78-98) 139/80  No intake or output data in the 24 hours ending 08/09/21 1008    PHYSICAL EXAMINATION      Physical Exam  Constitutional:       Appearance: Normal appearance. He is normal weight.   HENT:      Head: Normocephalic and atraumatic.   Cardiovascular:      Rate and Rhythm: Normal rate and regular rhythm.      Pulses: Normal pulses.   Pulmonary:      Effort: Pulmonary effort is normal.      Breath sounds: Normal breath sounds.   Abdominal:      Palpations: Abdomen is soft.      Comments: Hyperactive Bowel sounds.  Abd is non tender   Musculoskeletal:         General: Normal range of motion.      Right lower leg: No edema.      Left lower leg: No edema.   Skin:     General: Skin is warm and dry.      Capillary Refill: Capillary refill takes less than 2 seconds.   Neurological:      General: No focal deficit present.      Mental Status: He is alert and oriented to person, place, and time. Mental status is at baseline.   Psychiatric:         Mood and Affect: Mood normal.         Behavior: Behavior normal.         Thought Content: Thought content normal.         Judgment: Judgment normal.             LINES, CATHETERS, DRAINS, AIRWAYS, AND WOUNDS   Lines, Drains, Airways, Wounds:  Peripheral IV (Adult) 08/06/21 Anterior;Left Forearm (Active)   Number of days: 3        LABS / IMAGING / TELE      Labs  Inpatient labs over the last 24 hours independently reviewed by me:    Results from last 7 days   Lab Units 08/09/21  0726   SODIUM mEQ/L 139   POTASSIUM mEQ/L 4.1   CHLORIDE mEQ/L 100   CO2 mEQ/L 28   BUN mg/dL <5*   CREATININE mg/dL 1.1   GLUCOSE mg/dL 176*   CALCIUM mg/dL 9.7       Results from last 7 days   Lab Units 08/06/21  1011   MAGNESIUM mg/dL 1.5*       Results from last 7 days   Lab Units 08/08/21  0928   WBC K/uL 5.51   HEMOGLOBIN g/dL 16.1   HEMATOCRIT % 51.2*   PLATELETS K/uL 191       Imaging  I have independently reviewed the pertinent imaging from the last 24 hrs. and Significant findings include:     No results found.     ECG/Telemetry  I have independently reviewed the telemetry. No events for the last 24 hours.    ASSESSMENT AND PLAN      Essential hypertension  Assessment & Plan  Not on home meds for this  May be partially anxiety mediated  Improving now on nifedipine started here    Glaucoma  Assessment & Plan  Vision is stable   C/w home eye drops    Type 2 diabetes mellitus without complication, without long-term current use of insulin (CMS/[facility])  Assessment & Plan  A1c 7.8  Home regimen is metformin and glipizide - hold as inpatient and restart at time of discharge   ISS while inpatient    Sigmoid volvulus (CMS/[facility])  Assessment & Plan  - Intial CT with possible volvulus. However, low concern per GI. Pt with only mild abdominal pain. +BM  - GI following - no need for urgent intervention. Recommending inpatient colonoscopy to definitively r/o obstructing mass. Plan for 2 day prep and colonoscopy on 8/10  - clear liquid diet    * Encephalopathy  Assessment & Plan  Rapid progression of symptoms which began immediately after learning of the death of a coworker and the serious injury of another  "coworker  -based on timing of events, tangential thinking/significant insomnia/paranoia, lack of focal deficits, and no clear metabolic inciting factor, suspicion for psychiatric etiology is high   - Neuro work up including MRI shows no structural abnormality or acute findings. EEG normal. Severe B12 deficiency , recommend supplement 1000 mcg IM monthly x 6 months through PCP.   - Evaluated by Psych, pt with MDD. Start Remeron 15 mg qhs. They feel pt has mild to moderate depression and would benefit from outpatient therapy  -Patient is improving, now with linear thought process, improved memory, able to recount full details of his day yesterday. Patient admits to hyper-analyzing his symptoms. Said there were a few details of conversations from yesterday that seem \"fuzzy\" to him and due to that, he had anxiety attack and tearfulness. Needs continued reassurance.        VTE Assessment: Padua    VTE Prophylaxis Plan:   Medication reconciliation:   Code Status: Full Code  Estimated Discharge Date: 8/10/2021  Disposition Planning: pending colonoscopy 8/10/2021 for safe discharge planning to home    *Recommendations are not final until co-sign by attending*     SURINDER Schaeffer C  [facility] [place]       "

## 2021-08-10 ENCOUNTER — ANESTHESIA (INPATIENT)
Dept: ENDOSCOPY | Facility: HOSPITAL | Age: 51
End: 2021-08-10
Payer: COMMERCIAL

## 2021-08-10 ENCOUNTER — ANESTHESIA EVENT (INPATIENT)
Dept: ENDOSCOPY | Facility: HOSPITAL | Age: 51
End: 2021-08-10
Payer: COMMERCIAL

## 2021-08-10 VITALS
HEART RATE: 74 BPM | RESPIRATION RATE: 20 BRPM | TEMPERATURE: 97.5 F | WEIGHT: 147.1 LBS | OXYGEN SATURATION: 100 % | BODY MASS INDEX: 21.06 KG/M2 | DIASTOLIC BLOOD PRESSURE: 82 MMHG | SYSTOLIC BLOOD PRESSURE: 146 MMHG | HEIGHT: 70 IN

## 2021-08-10 LAB
GLUCOSE BLD-MCNC: 110 MG/DL (ref 70–99)
GLUCOSE BLD-MCNC: 111 MG/DL (ref 70–99)
GLUCOSE BLD-MCNC: 117 MG/DL (ref 70–99)
GLUCOSE BLD-MCNC: 87 MG/DL (ref 70–99)
POCT TEST: ABNORMAL
POCT TEST: NORMAL

## 2021-08-10 PROCEDURE — 0DJD8ZZ INSPECTION OF LOWER INTESTINAL TRACT, VIA NATURAL OR ARTIFICIAL OPENING ENDOSCOPIC: ICD-10-PCS | Performed by: INTERNAL MEDICINE

## 2021-08-10 PROCEDURE — 25800000 HC PHARMACY IV SOLUTIONS: Performed by: NURSE ANESTHETIST, CERTIFIED REGISTERED

## 2021-08-10 PROCEDURE — 63600000 HC DRUGS/DETAIL CODE: Performed by: NURSE ANESTHETIST, CERTIFIED REGISTERED

## 2021-08-10 PROCEDURE — 99238 HOSP IP/OBS DSCHRG MGMT 30/<: CPT | Performed by: INTERNAL MEDICINE

## 2021-08-10 PROCEDURE — 75000014 HC COLONSCOPY DIAGNOSTIC: Performed by: INTERNAL MEDICINE

## 2021-08-10 PROCEDURE — 37000002 HC ANESTHESIA MAC: Performed by: INTERNAL MEDICINE

## 2021-08-10 PROCEDURE — 71000001 HC PACU PHASE 1 INITIAL 30MIN: Performed by: INTERNAL MEDICINE

## 2021-08-10 PROCEDURE — G0378 HOSPITAL OBSERVATION PER HR: HCPCS

## 2021-08-10 PROCEDURE — 71000011 HC PACU PHASE 1 EA ADDL MIN: Performed by: INTERNAL MEDICINE

## 2021-08-10 PROCEDURE — 25000000 HC PHARMACY GENERAL: Performed by: NURSE ANESTHETIST, CERTIFIED REGISTERED

## 2021-08-10 PROCEDURE — 63700000 HC SELF-ADMINISTRABLE DRUG: Performed by: STUDENT IN AN ORGANIZED HEALTH CARE EDUCATION/TRAINING PROGRAM

## 2021-08-10 RX ORDER — PROPOFOL 200MG/20ML
SYRINGE (ML) INTRAVENOUS AS NEEDED
Status: DISCONTINUED | OUTPATIENT
Start: 2021-08-10 | End: 2021-08-10 | Stop reason: SURG

## 2021-08-10 RX ORDER — PROPOFOL 10 MG/ML
INJECTION, EMULSION INTRAVENOUS CONTINUOUS PRN
Status: DISCONTINUED | OUTPATIENT
Start: 2021-08-10 | End: 2021-08-10 | Stop reason: SURG

## 2021-08-10 RX ORDER — MIRTAZAPINE 15 MG/1
15 TABLET, FILM COATED ORAL NIGHTLY
Qty: 30 TABLET | Refills: 0 | Status: SHIPPED | OUTPATIENT
Start: 2021-08-10 | End: 2021-09-14 | Stop reason: ENTERED-IN-ERROR

## 2021-08-10 RX ORDER — NIFEDIPINE 30 MG/1
30 TABLET, EXTENDED RELEASE ORAL DAILY
Qty: 30 TABLET | Refills: 0 | Status: SHIPPED | OUTPATIENT
Start: 2021-08-10 | End: 2021-09-14 | Stop reason: ENTERED-IN-ERROR

## 2021-08-10 RX ORDER — SODIUM CHLORIDE 9 MG/ML
INJECTION, SOLUTION INTRAVENOUS CONTINUOUS PRN
Status: DISCONTINUED | OUTPATIENT
Start: 2021-08-10 | End: 2021-08-10 | Stop reason: SURG

## 2021-08-10 RX ORDER — EPHEDRINE SULFATE/0.9% NACL/PF 50 MG/5 ML
SYRINGE (ML) INTRAVENOUS AS NEEDED
Status: DISCONTINUED | OUTPATIENT
Start: 2021-08-10 | End: 2021-08-10 | Stop reason: SURG

## 2021-08-10 RX ORDER — LIDOCAINE HYDROCHLORIDE 10 MG/ML
INJECTION, SOLUTION EPIDURAL; INFILTRATION; INTRACAUDAL; PERINEURAL AS NEEDED
Status: DISCONTINUED | OUTPATIENT
Start: 2021-08-10 | End: 2021-08-10 | Stop reason: SURG

## 2021-08-10 RX ORDER — PHENYLEPHRINE HCL IN 0.9% NACL 1 MG/10 ML
SYRINGE (ML) INTRAVENOUS AS NEEDED
Status: DISCONTINUED | OUTPATIENT
Start: 2021-08-10 | End: 2021-08-10 | Stop reason: SURG

## 2021-08-10 RX ADMIN — SODIUM CHLORIDE: 9 INJECTION, SOLUTION INTRAVENOUS at 11:37

## 2021-08-10 RX ADMIN — Medication 10 MG: at 12:15

## 2021-08-10 RX ADMIN — Medication 150 MCG: at 12:45

## 2021-08-10 RX ADMIN — MAGNESIUM OXIDE TAB 400 MG (241.3 MG ELEMENTAL MG) 400 MG: 400 (241.3 MG) TAB at 08:39

## 2021-08-10 RX ADMIN — NIFEDIPINE 30 MG: 30 TABLET, FILM COATED, EXTENDED RELEASE ORAL at 08:39

## 2021-08-10 RX ADMIN — Medication 200 MCG: at 12:20

## 2021-08-10 RX ADMIN — Medication 10 MG: at 11:53

## 2021-08-10 RX ADMIN — Medication 100 MCG: at 11:54

## 2021-08-10 RX ADMIN — Medication 100 MCG: at 12:34

## 2021-08-10 RX ADMIN — PROPOFOL 60 MG: 10 INJECTION, EMULSION INTRAVENOUS at 11:45

## 2021-08-10 RX ADMIN — TIMOLOL MALEATE 1 DROP: 5 SOLUTION/ DROPS OPHTHALMIC at 08:41

## 2021-08-10 RX ADMIN — Medication 200 MCG: at 12:25

## 2021-08-10 RX ADMIN — Medication 150 MCG: at 12:15

## 2021-08-10 RX ADMIN — Medication 100 MCG: at 12:04

## 2021-08-10 RX ADMIN — LIDOCAINE HYDROCHLORIDE 5 ML: 10 INJECTION, SOLUTION EPIDURAL; INFILTRATION; INTRACAUDAL; PERINEURAL at 11:43

## 2021-08-10 RX ADMIN — ATORVASTATIN CALCIUM 10 MG: 10 TABLET, FILM COATED ORAL at 08:39

## 2021-08-10 RX ADMIN — PROPOFOL INJECTABLE EMULSION 125 MCG/KG/MIN: 10 INJECTION, EMULSION INTRAVENOUS at 11:45

## 2021-08-10 RX ADMIN — Medication 10 MG: at 12:02

## 2021-08-10 ASSESSMENT — PAIN SCALES - GENERAL: PAIN_LEVEL: 0

## 2021-08-10 NOTE — NURSING NOTE
Patient AAOx3, no c/o pian or discomfort, ambulated wit steady gait. Discharged home. Heplock and telemonitor removed. All the belongings with the patient. Discharge instructions provided, and patient verbalizes understanding. Medication education given, all questions answered. Patient escorted to the family car by a wheelchair.

## 2021-08-10 NOTE — PROGRESS NOTES
Brief Colonoscopy Note:    Findings:  - Tortuous colon.   - Normal colon.  - The distal rectum and anal verge are normal on retroflexion view.   - No specimens collected.   - No masses visualized.    Plan:  -Resume previous diet  -Continue present medicines

## 2021-08-10 NOTE — NURSING NOTE
Pt A&Ox4, denies pain. Pt has some anxiety r/t memory problems. Pt assured that his note taking is acceptable to assist him r/t his current situation. PO prep for colonoscopy consumed. Pt continent of B&B. Glucose monitored. Call bell in reach.

## 2021-08-10 NOTE — DISCHARGE INSTRUCTIONS
You were admitted for the following:    Change in mental status:  You were evaluated by our Neurologist, and had MRI and EEG. Your studies did not show any acute findings. Neulogy recommends you start B12 supplementation. Our Psychiatrist assessment notes your symptoms may be due to major depressive disorder. You are recommended to start Remeron 15 mg at bedtime. Please follow up with your PCP for further management.     Your CT scan showed a possible volvulus. You had minimal symptoms while you were admitted.  You had a flex/sig procedure which was normal. You were able to eat ok.  You do not need further work up on this while admitted.     Hypertension:  Start nifedipine.     Note the following changes in your medications:  - START mirtazapine (Remeron) 15 mg at bedtime  - START nifedipine XL (Procardia XL) 30 mg every 24 hours  - START monthly B12 supplement of 1000 mg IM  - CONTINUE all other medications as prescribed     Make an appointment with GI for follow up. Please call the office above for an appointment in 4 weeks.     Please call to make an appointment with your PCP provider Maynor Sanders DO in 7 days to update them of your admission and changes to your medications.    It was a pleasure to take care of you, best regards!    Please use the contact information of your follow up providers as listed in the follow up section of this packet.    PLEASE BRING THESE PAPERS TO YOUR FOLLOW UP APPOINTMENTS

## 2021-08-10 NOTE — ANESTHESIA POSTPROCEDURE EVALUATION
Patient: Stephane Kirby    Procedure Summary     Date: 08/10/21 Room / Location: [facility] 1 (A) / [facility]    Anesthesia Start: 1139 Anesthesia Stop: 1256    Procedure: MI COLONOSCOPY FLX DX W/COLLJ SPEC WHEN PFRMD [90945 (CPT®)] (N/A Anus) Diagnosis:       Sigmoid volvulus (CMS/HCC)      (Sigmoid volvulus (CMS/HCC) [K56.2])    Providers: Joby Wolfe MD Responsible Provider: Ze Rahman MD    Anesthesia Type: MAC ASA Status: 3          Anesthesia Type: MAC  PACU Vitals  8/10/2021 1251 - 8/10/2021 1351      8/10/2021  1253 8/10/2021  1300 8/10/2021  1310 8/10/2021  1320    BP:  (!) 89/50  102/64  111/74  121/78    Temp:  (!) 35.6 °C (96 °F)  --  --  --    Pulse:  83  81  83  78    Resp:  20  20  18  19    SpO2:  100 %  100 %  100 %  100 %              8/10/2021  1330             BP:  121/82       Temp:  --       Pulse:  78       Resp:  18       SpO2:  100 %               Anesthesia Post Evaluation    Pain score: 0  Pain management: satisfactory to patient  Mode of pain management: IV medication  Patient location during evaluation: PACU  Patient participation: complete - patient participated  Level of consciousness: awake and alert  Cardiovascular status: acceptable  Airway Patency: adequate  Respiratory status: acceptable  Hydration status: stable  Anesthetic complications: no

## 2021-08-10 NOTE — PLAN OF CARE
Per rounds, pt is s/p colonoscopy, stable for d/c home today, no needs. SW will continue to follow for emotional support and dispo planning. ALBERTO Dhaliwal

## 2021-08-10 NOTE — ANESTHESIOLOGIST PRE-PROCEDURE ATTESTATION
Pre-Procedure Patient Identification:  I am the Primary Anesthesiologist and have identified the patient on 08/10/21 at 11:29 AM.   I have confirmed the procedure(s) will be performed by the following surgeon/proceduralist Joby Wolfe MD.

## 2021-08-10 NOTE — ANESTHESIA PREPROCEDURE EVALUATION
Relevant Problems   CARDIOVASCULAR   (+) Essential hypertension      Other   (+) Type 2 diabetes mellitus without complication, without long-term current use of insulin (CMS/[facility])       Anesthesia ROS/MED HX      Cardiovascular   hypertension  Endo/Other   Diabetes  Body Habitus: Normal       History reviewed. No pertinent surgical history.    Physical Exam    Airway   Mallampati: III   TM distance: >3 FB   Neck ROM: full  Cardiovascular    Rhythm: regular   Rate: normal      Anesthesia Plan    Plan: MAC    Technique: MAC     Lines and Monitors: PIV     Airway: natural airway / supplemental oxygen   ASA 3  Anesthetic plan and risks discussed with: patient  Induction:    intravenous   Postop Plan:   Patient Disposition: phase II then home   Pain Management: IV analgesics

## 2021-08-10 NOTE — PLAN OF CARE
Plan of Care Review  Plan of Care Reviewed With: patient  Progress: improving  Outcome Summary: patient NPO, clear t-colored stool, called to colonoscopy

## 2021-08-10 NOTE — PLAN OF CARE
Problem: Adult Inpatient Plan of Care  Goal: Patient-Specific Goal (Individualized)  Outcome: Progressing  Flowsheets (Taken 8/10/2021 0137)  Patient-Specific Goals (Include Timeframe): completing PO prep for procedure.  Individualized Care Needs: pt erassured that if his note taking is important if keeps his thoughts organized  Anxieties, Fears or Concerns: pt preoccupied with limited memory recall ability   Plan of Care Review  Plan of Care Reviewed With: patient  Progress: improving  Outcome Summary: NPO after midnight, prep for procedure finished
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
## 2021-08-11 NOTE — OP NOTE
_______________________________________________________________________________  Patient Name: Stephane Kirby          Procedure Date: 8/10/2021 11:38 AM  MRN: [id number]                     Account Number: [id number]  YOB: 1970               Age: 51  Gender: Male                          Note Status: Finalized  Attending MD: JIN LANGFORD MD~PRIMITIVO  _______________________________________________________________________________  Procedure:             Colonoscopy  Indications:           Abnormal CT of the GI tract, Exclusion of volvulus  Providers:             JIN LANGFORD MD~PRIMITIVO (Doctor), SUBHASH GAVIRIA DO (Fellow)  Referring MD:          ARGENIS POWELL DO~SHERRY  Requesting Provider:  Medicines:             See the Anesthesia note for documentation of the  administered medications  Complications:         No immediate complications.  _______________________________________________________________________________  Procedure:             After I obtained informed consent, the scope was  passed under direct vision. Throughout the procedure,  the patient's blood pressure, pulse, and oxygen  saturations were monitored continuously. The  colonoscope was introduced through the anus and  advanced to the cecum, identified by appendiceal  orifice and ileocecal valve. The colonoscopy was  performed without difficulty. The patient tolerated  the procedure well. The quality of the bowel  preparation was good.  Findings:  The perianal and digital rectal examinations were normal.  The colon (entire examined portion) was tortuous.  The exam was otherwise normal throughout the examined colon.  The retroflexed view of the distal rectum and anal verge was normal and  showed no anal or rectal abnormalities.  Impression:            - Tortuous colon.  - The distal rectum and anal verge are normal on  retroflexion view.  - No specimens collected.  Recommendation:        - [facility].  - Resume previous diet.  - Continue present medications.  Procedure Code(s):     --- Professional ---  73876, Colonoscopy, flexible; diagnostic, including  collection of specimen(s) by brushing or washing, when  performed (separate procedure)  Diagnosis Code(s):     --- Professional ---  R93.3, Abnormal findings on diagnostic imaging of  other parts of digestive tract  Q43.8, Other specified congenital malformations of  intestine  CPT copyright 2020 [employer] Medical Association. All rights reserved.  The codes documented in this report are preliminary and upon  review may  be revised to meet current compliance requirements.  Attending Participation:  I was present and participated during the entire procedure, including  non-key portions.  _____________________  JIN LANGFORD MD~PRIMITIVO  8/11/2021 11:14:08 AM  This report has been signed electronically.  Number of Addenda: 0  Note Initiated On: 8/10/2021 11:38 AM

## 2021-08-12 LAB — METHYLMALONATE SERPL-SCNC: 121 NMOL/L (ref 87–318)

## 2021-08-13 NOTE — [facility] PHYSICIAN REVIEW NOTE
8/6:  51 y.o. male with h/o DM p/w behavior and memory changes.  CT poss volvulus.  CT brain WNL.    8/7:  Much better.  MRI and EEG neg.  Remeron rec'd by psych.  GI plans colonoscopy with two day prep given motility issues.  Thought to be self limited given large BM in ER.    8/10:  Normal colonoscopy.  DC'd.    Rec accept observation.  No p2p called.    Cathleen Melchor, DO

## 2021-08-20 ENCOUNTER — DOCTOR'S OFFICE (OUTPATIENT)
Dept: URBAN - METROPOLITAN AREA CLINIC 127 | Facility: CLINIC | Age: 51
Setting detail: OPHTHALMOLOGY
End: 2021-08-20
Payer: COMMERCIAL

## 2021-08-20 DIAGNOSIS — H04.123: ICD-10-CM

## 2021-08-20 DIAGNOSIS — H40.1112: ICD-10-CM

## 2021-08-20 DIAGNOSIS — H01.001: ICD-10-CM

## 2021-08-20 DIAGNOSIS — H40.1132: ICD-10-CM

## 2021-08-20 DIAGNOSIS — H50.15: ICD-10-CM

## 2021-08-20 DIAGNOSIS — H35.373: ICD-10-CM

## 2021-08-20 PROCEDURE — 92250 FUNDUS PHOTOGRAPHY W/I&R: CPT | Performed by: OPHTHALMOLOGY

## 2021-08-20 PROCEDURE — 65855 TRABECULOPLASTY LASER SURG: CPT | Performed by: OPHTHALMOLOGY

## 2021-08-20 PROCEDURE — 92014 COMPRE OPH EXAM EST PT 1/>: CPT | Performed by: OPHTHALMOLOGY

## 2021-08-20 PROCEDURE — 92083 EXTENDED VISUAL FIELD XM: CPT | Performed by: OPHTHALMOLOGY

## 2021-08-20 ASSESSMENT — REFRACTION_MANIFEST
OS_SPHERE: +1.25
OS_AXIS: 95
OS_SPHERE: +2.50
OD_SPHERE: +1.00
OD_VA1: 20/25
OS_CYLINDER: +0.50
OD_ADD: +2.50
OS_ADD: +2.50
OD_SPHERE: +2.50
OS_VA1: 20/30

## 2021-08-20 ASSESSMENT — SUPERFICIAL PUNCTATE KERATITIS (SPK)
OD_SPK: 1+
OS_SPK: 1+

## 2021-08-20 ASSESSMENT — REFRACTION_CURRENTRX
OS_CYLINDER: +0.75
OD_CYLINDER: +0.75
OD_ADD: +1.50
OD_AXIS: 175
OS_AXIS: 93
OS_ADD: +1.50
OS_SPHERE: +1.25
OS_OVR_VA: 20/
OD_SPHERE: +0.75
OD_OVR_VA: 20/

## 2021-08-20 ASSESSMENT — SPHEQUIV_DERIVED: OS_SPHEQUIV: 1.5

## 2021-08-20 ASSESSMENT — PACHYMETRY
OD_CT_UM: 490
OD_CT_CORRECTION: 4
OS_CT_UM: 491
OS_CT_CORRECTION: 4

## 2021-08-20 ASSESSMENT — LID EXAM ASSESSMENTS
OD_BLEPHARITIS: 2+
OS_BLEPHARITIS: 2+

## 2021-08-20 ASSESSMENT — VISUAL ACUITY
OD_BCVA: 20/30+2
OS_BCVA: 20/25

## 2021-08-20 ASSESSMENT — CONFRONTATIONAL VISUAL FIELD TEST (CVF)
OS_FINDINGS: FULL
OD_FINDINGS: FULL

## 2021-08-20 ASSESSMENT — TEAR BREAK UP TIME (TBUT)
OS_TBUT: 1+
OD_TBUT: 1+

## 2021-09-14 ENCOUNTER — OFFICE VISIT (OUTPATIENT)
Dept: FAMILY MEDICINE | Facility: CLINIC | Age: 51
End: 2021-09-14
Payer: COMMERCIAL

## 2021-09-14 VITALS
WEIGHT: 145.4 LBS | RESPIRATION RATE: 16 BRPM | BODY MASS INDEX: 20.81 KG/M2 | DIASTOLIC BLOOD PRESSURE: 76 MMHG | SYSTOLIC BLOOD PRESSURE: 126 MMHG | HEART RATE: 79 BPM | HEIGHT: 70 IN | TEMPERATURE: 96.6 F | OXYGEN SATURATION: 98 %

## 2021-09-14 DIAGNOSIS — I10 ESSENTIAL HYPERTENSION: ICD-10-CM

## 2021-09-14 DIAGNOSIS — E53.8 B12 DEFICIENCY: ICD-10-CM

## 2021-09-14 DIAGNOSIS — E11.9 TYPE 2 DIABETES MELLITUS WITHOUT COMPLICATION, WITHOUT LONG-TERM CURRENT USE OF INSULIN (CMS/HCC): ICD-10-CM

## 2021-09-14 DIAGNOSIS — G93.40 ENCEPHALOPATHY: Primary | ICD-10-CM

## 2021-09-14 DIAGNOSIS — R41.82 ALTERED MENTAL STATUS, UNSPECIFIED ALTERED MENTAL STATUS TYPE: ICD-10-CM

## 2021-09-14 PROCEDURE — 99214 OFFICE O/P EST MOD 30 MIN: CPT | Performed by: INTERNAL MEDICINE

## 2021-09-14 PROCEDURE — 3078F DIAST BP <80 MM HG: CPT | Performed by: INTERNAL MEDICINE

## 2021-09-14 PROCEDURE — 3074F SYST BP LT 130 MM HG: CPT | Performed by: INTERNAL MEDICINE

## 2021-09-14 PROCEDURE — 3008F BODY MASS INDEX DOCD: CPT | Performed by: INTERNAL MEDICINE

## 2021-09-14 RX ORDER — LANOLIN ALCOHOL/MO/W.PET/CERES
1000 CREAM (GRAM) TOPICAL DAILY
Qty: 90 TABLET | Refills: 3 | Status: SHIPPED | OUTPATIENT
Start: 2021-09-14 | End: 2024-07-12

## 2021-09-14 RX ORDER — FLASH GLUCOSE SENSOR
KIT MISCELLANEOUS SEE ADMIN INSTRUCTIONS
COMMUNITY
Start: 2021-07-13 | End: 2022-10-24 | Stop reason: SDUPTHER

## 2021-09-14 RX ORDER — CYANOCOBALAMIN 1000 UG/ML
1000 INJECTION, SOLUTION INTRAMUSCULAR; SUBCUTANEOUS ONCE
Status: COMPLETED | OUTPATIENT
Start: 2021-09-14 | End: 2021-09-14

## 2021-09-14 RX ADMIN — CYANOCOBALAMIN 1000 MCG: 1000 INJECTION, SOLUTION INTRAMUSCULAR; SUBCUTANEOUS at 15:16

## 2021-09-14 ASSESSMENT — PATIENT HEALTH QUESTIONNAIRE - PHQ9: SUM OF ALL RESPONSES TO PHQ9 QUESTIONS 1 & 2: 0

## 2021-09-14 NOTE — PATIENT INSTRUCTIONS
B12 take daily - 1000mcg under tongue  B complex daily   Neurocognitive evaluation - [phone]  Stop Remeron   Stop Nifedipine   Follow up 4-6 weeks

## 2021-09-14 NOTE — PROGRESS NOTES
Stephane Kirby is a 51 y.o. male     50 y/o male presents as new patient   Previously Dr. Silva     In August cognitive decline  Repetition of thoughts  Re-asking questions   Stuttering   Inability to multi-task at work  Second guessing work     Not all short term    Behavorial changes   More emotional   Was a hoarder now cleaning out house   More expressive  Judgement inaccurate  Perseverates     HTN - no headaches, dizziness, lightheadedness, CP  Checking at home   Tolerating meds without side effects     DM at goal   Tolerating meds         Past Medical History:   Diagnosis Date   • Glaucoma    • Lipid disorder    • Type 2 diabetes mellitus (CMS/HCC)        Past Surgical History:   Procedure Laterality Date   • EYE SURGERY     • GLAUCOMA SURGERY Bilateral         Social History     Socioeconomic History   • Marital status: Single     Spouse name: None   • Number of children: None   • Years of education: None   • Highest education level: None   Occupational History   • None   Tobacco Use   • Smoking status: Former Smoker   • Smokeless tobacco: Never Used   Substance and Sexual Activity   • Alcohol use: Yes     Comment: rarely   • Drug use: Not Currently   • Sexual activity: Defer   Other Topics Concern   • None   Social History Narrative   • None     Social Determinants of Health     Financial Resource Strain:    • Difficulty of Paying Living Expenses:    Food Insecurity: No Food Insecurity   • Worried About Running Out of Food in the Last Year: Never true   • Ran Out of Food in the Last Year: Never true   Transportation Needs:    • Lack of Transportation (Medical):    • Lack of Transportation (Non-Medical):    Physical Activity:    • Days of Exercise per Week:    • Minutes of Exercise per Session:    Stress:    • Feeling of Stress :    Social Connections:    • Frequency of Communication with Friends and Family:    • Frequency of Social Gatherings with Friends and Family:    • Attends Buddhist Services:     • Active Member of Clubs or Organizations:    • Attends Club or Organization Meetings:    • Marital Status:    Intimate Partner Violence:    • Fear of Current or Ex-Partner:    • Emotionally Abused:    • Physically Abused:    • Sexually Abused:        Family History   Problem Relation Age of Onset   • Hypertension Biological Mother    • Diabetes Biological Mother    • Hyperlipidemia Biological Father    • Heart attack Biological Father        Patient has no known allergies.    Current Outpatient Medications   Medication Sig Dispense Refill   • FREESTYLE JAKUB 14 DAY SENSOR kit See admin instr.     • glipiZIDE (GLUCOTROL XL) 5 mg 24 hr tablet Take 10 mg by mouth 2 (two) times a day with meals.       • Lactobacillus acidophilus (PROBIOTIC) 10 billion cell capsule Take 2 capsules by mouth daily.     • latanoprost (XALATAN) 0.005 % ophthalmic solution Administer 1 drop into both eyes nightly.     • metFORMIN (GLUCOPHAGE) 1,000 mg tablet Take 1,000 mg by mouth 2 (two) times a day.     • mirtazapine (REMERON) 15 mg tablet Take 1 tablet (15 mg total) by mouth nightly. 30 tablet 0   • NIFEdipine XL (PROCARDIA XL) 30 mg 24 hr tablet Take 1 tablet (30 mg total) by mouth daily. 30 tablet 0   • simvastatin (ZOCOR) 20 mg tablet Take 20 mg by mouth nightly.       • timolol (TIMOPTIC) 0.5 % ophthalmic solution Administer 1 drop into both eyes daily.         No current facility-administered medications for this visit.       Review of Systems   Constitutional: Negative.    HENT: Negative.    Eyes: Negative.    Respiratory: Negative.    Cardiovascular: Negative.    Gastrointestinal: Negative.    Endocrine: Negative.    Genitourinary: Negative.    Musculoskeletal: Negative.    Skin: Negative.    Allergic/Immunologic: Negative.    Neurological: Negative.    Hematological: Negative.    Psychiatric/Behavioral: Negative.    All other systems reviewed and are negative.         Vitals:    09/14/21 1417   BP: 126/76   Pulse: 79   Resp: 16    Temp: (!) 35.9 °C (96.6 °F)   SpO2: 98%       Body mass index is 20.86 kg/m².      Physical Exam  Constitutional:       Appearance: Normal appearance.   HENT:      Right Ear: Tympanic membrane, ear canal and external ear normal.      Left Ear: Tympanic membrane, ear canal and external ear normal.   Eyes:      Extraocular Movements: Extraocular movements intact.      Conjunctiva/sclera: Conjunctivae normal.      Pupils: Pupils are equal, round, and reactive to light.      Comments: anicteric no pallor      Cardiovascular:      Rate and Rhythm: Normal rate and regular rhythm.      Pulses: Normal pulses.      Heart sounds: Normal heart sounds. No murmur heard.     Pulmonary:      Effort: Pulmonary effort is normal.      Breath sounds: Normal breath sounds. No wheezing or rhonchi.   Neurological:      General: No focal deficit present.      Mental Status: He is alert and oriented to person, place, and time.   Psychiatric:         Mood and Affect: Mood normal.         Behavior: Behavior normal.          Assessment/Plan      Diagnoses and all orders for this visit:    Encephalopathy (Primary)  -     Neuropsychological testing; Future  -     Lyme Disease Antibodies (IgG, IgM),; Future  -     RPR; Future  Check additional labs  Neuropysch eval   Stop mirtazapine   Doubt depression but there may be psych element  F/u 6-8 weeks  Altered mental status, unspecified altered mental status type  -     Neuropsychological testing; Future    B12 deficiency  -     cyanocobalamin (VITAMIN B-12) injection 1,000 mcg    Essential hypertension  At goal ok to stop nifedipine   Type 2 diabetes mellitus without complication, without long-term current use of insulin (CMS/[facility])  At goal   Follow up A1c in 6-8 weeks  Other orders  -     cyanocobalamin (VITAMIN B12) 1,000 mcg tablet; Take 1 tablet (1,000 mcg total) by mouth daily.          New Medications Ordered This Visit   Medications   • FREESTYLE JAKUB 14 DAY SENSOR kit     Sig: See admin  instr.       There are no discontinued medications.     No orders of the defined types were placed in this encounter.       Olivier Melchor, DO  9/14/2021

## 2021-09-15 ENCOUNTER — APPOINTMENT (OUTPATIENT)
Dept: LAB | Facility: HOSPITAL | Age: 51
End: 2021-09-15
Attending: INTERNAL MEDICINE
Payer: COMMERCIAL

## 2021-09-15 DIAGNOSIS — G93.40 ENCEPHALOPATHY: ICD-10-CM

## 2021-09-15 PROBLEM — K56.2 SIGMOID VOLVULUS (CMS/HCC): Status: RESOLVED | Noted: 2021-08-06 | Resolved: 2021-09-15

## 2021-09-15 PROCEDURE — 86592 SYPHILIS TEST NON-TREP QUAL: CPT

## 2021-09-15 PROCEDURE — 86617 LYME DISEASE ANTIBODY: CPT

## 2021-09-15 PROCEDURE — 36415 COLL VENOUS BLD VENIPUNCTURE: CPT

## 2021-09-15 ASSESSMENT — ENCOUNTER SYMPTOMS
PSYCHIATRIC NEGATIVE: 1
CONSTITUTIONAL NEGATIVE: 1
HEMATOLOGIC/LYMPHATIC NEGATIVE: 1
MUSCULOSKELETAL NEGATIVE: 1
GASTROINTESTINAL NEGATIVE: 1
RESPIRATORY NEGATIVE: 1
ALLERGIC/IMMUNOLOGIC NEGATIVE: 1
CARDIOVASCULAR NEGATIVE: 1
EYES NEGATIVE: 1
NEUROLOGICAL NEGATIVE: 1
ENDOCRINE NEGATIVE: 1

## 2021-09-16 LAB
B BURGDOR IGG SER QL IB: NEGATIVE
B BURGDOR IGM SER QL IB: NEGATIVE
B BURGDOR18KD IGG SER QL IB: NORMAL
B BURGDOR23KD IGG SER QL IB: NORMAL
B BURGDOR23KD IGM SER QL IB: NORMAL
B BURGDOR28KD IGG SER QL IB: NORMAL
B BURGDOR30KD IGG SER QL IB: NORMAL
B BURGDOR39KD IGG SER QL IB: NORMAL
B BURGDOR39KD IGM SER QL IB: NORMAL
B BURGDOR41KD IGG SER QL IB: NORMAL
B BURGDOR41KD IGM SER QL IB: NORMAL
B BURGDOR45KD IGG SER QL IB: NORMAL
B BURGDOR58KD IGG SER QL IB: NORMAL
B BURGDOR66KD IGG SER QL IB: NORMAL
B BURGDOR93KD IGG SER QL IB: NORMAL
RPR SER QL: NORMAL

## 2021-09-17 ENCOUNTER — TELEPHONE (OUTPATIENT)
Dept: FAMILY MEDICINE | Facility: CLINIC | Age: 51
End: 2021-09-17

## 2021-09-17 RX ORDER — MIRTAZAPINE 15 MG/1
15 TABLET, FILM COATED ORAL NIGHTLY
Qty: 30 TABLET | Refills: 3 | Status: SHIPPED | OUTPATIENT
Start: 2021-09-17 | End: 2021-09-30 | Stop reason: SDUPTHER

## 2021-09-17 NOTE — TELEPHONE ENCOUNTER
Notes from 9/15 indicate we were going to order remeron for patient but was not sent    Patient contacted office to request prescription refill:    Name of medication:remeron  Strength of medication:15 mg  SIG:nightly  Quantity:90  Requested number refills:  Preferred Pharmacy:[facility]  Pharmacy number: [phone]    Patient was on medication previously

## 2021-09-17 NOTE — TELEPHONE ENCOUNTER
Medicine Refill Request    Last Office Visit: 9/14/2021  Last Telemedicine Visit: Visit date not found    Next Office Visit: 11/12/2021  Next Telemedicine Visit: Visit date not found         Current Outpatient Medications:   •  cyanocobalamin (VITAMIN B12) 1,000 mcg tablet, Take 1 tablet (1,000 mcg total) by mouth daily., Disp: 90 tablet, Rfl: 3  •  FREESTYLE JAKUB 14 DAY SENSOR kit, See admin instr., Disp: , Rfl:   •  glipiZIDE (GLUCOTROL XL) 5 mg 24 hr tablet, Take 10 mg by mouth 2 (two) times a day with meals.  , Disp: , Rfl:   •  Lactobacillus acidophilus (PROBIOTIC) 10 billion cell capsule, Take 2 capsules by mouth daily., Disp: , Rfl:   •  latanoprost (XALATAN) 0.005 % ophthalmic solution, Administer 1 drop into both eyes nightly., Disp: , Rfl:   •  metFORMIN (GLUCOPHAGE) 1,000 mg tablet, Take 1,000 mg by mouth 2 (two) times a day., Disp: , Rfl:   •  simvastatin (ZOCOR) 20 mg tablet, Take 20 mg by mouth nightly.  , Disp: , Rfl:   •  timolol (TIMOPTIC) 0.5 % ophthalmic solution, Administer 1 drop into both eyes daily.  , Disp: , Rfl:       BP Readings from Last 3 Encounters:   09/14/21 126/76   08/10/21 (!) 146/82       Recent Lab results:  Lab Results   Component Value Date    CHOL 108 08/07/2021   ,   Lab Results   Component Value Date    HDL 30 (L) 08/07/2021   ,   Lab Results   Component Value Date    LDLCALC 69 08/07/2021   ,   Lab Results   Component Value Date    TRIG 45 08/07/2021        Lab Results   Component Value Date    GLUCOSE 176 (H) 08/09/2021   ,   Lab Results   Component Value Date    HGBA1C 7.8 (H) 08/06/2021         Lab Results   Component Value Date    CREATININE 1.1 08/09/2021       Lab Results   Component Value Date    TSH 1.57 08/06/2021

## 2021-09-30 RX ORDER — MIRTAZAPINE 15 MG/1
TABLET, FILM COATED ORAL
Qty: 90 TABLET | Refills: 1 | OUTPATIENT
Start: 2021-09-30

## 2021-09-30 RX ORDER — MIRTAZAPINE 15 MG/1
15 TABLET, FILM COATED ORAL NIGHTLY
Qty: 30 TABLET | Refills: 3 | Status: SHIPPED | OUTPATIENT
Start: 2021-09-30 | End: 2022-02-25

## 2021-11-12 ENCOUNTER — OFFICE VISIT (OUTPATIENT)
Dept: FAMILY MEDICINE | Facility: CLINIC | Age: 51
End: 2021-11-12
Payer: COMMERCIAL

## 2021-11-12 VITALS
HEIGHT: 70 IN | WEIGHT: 155.6 LBS | BODY MASS INDEX: 22.28 KG/M2 | SYSTOLIC BLOOD PRESSURE: 142 MMHG | OXYGEN SATURATION: 98 % | DIASTOLIC BLOOD PRESSURE: 80 MMHG | RESPIRATION RATE: 16 BRPM | HEART RATE: 89 BPM | TEMPERATURE: 96.6 F

## 2021-11-12 DIAGNOSIS — E11.9 TYPE 2 DIABETES MELLITUS WITHOUT COMPLICATION, WITHOUT LONG-TERM CURRENT USE OF INSULIN (CMS/HCC): Primary | ICD-10-CM

## 2021-11-12 DIAGNOSIS — I10 ESSENTIAL HYPERTENSION: ICD-10-CM

## 2021-11-12 DIAGNOSIS — E53.8 B12 DEFICIENCY: ICD-10-CM

## 2021-11-12 DIAGNOSIS — G93.40 ENCEPHALOPATHY: ICD-10-CM

## 2021-11-12 PROCEDURE — 3008F BODY MASS INDEX DOCD: CPT | Performed by: INTERNAL MEDICINE

## 2021-11-12 PROCEDURE — 3079F DIAST BP 80-89 MM HG: CPT | Performed by: INTERNAL MEDICINE

## 2021-11-12 PROCEDURE — 99214 OFFICE O/P EST MOD 30 MIN: CPT | Performed by: INTERNAL MEDICINE

## 2021-11-12 PROCEDURE — 3077F SYST BP >= 140 MM HG: CPT | Performed by: INTERNAL MEDICINE

## 2021-11-12 NOTE — PROGRESS NOTES
Stephane Kirby is a 51 y.o. male     52 y/o male presents for follow up   Feeling better on B12 and remeron   Memory improving     HTN - no headaches, dizziness, lightheadedness, CP  Checking at home   Tolerating meds without side effects     DM - A1c 7.8   On metformin   Getting lows middle of night   Taking glipizide Twice daily    No numbness paresthesias         Past Medical History:   Diagnosis Date   • Glaucoma    • Lipid disorder    • Type 2 diabetes mellitus (CMS/HCC)        Past Surgical History:   Procedure Laterality Date   • EYE SURGERY     • GLAUCOMA SURGERY Bilateral         Social History     Socioeconomic History   • Marital status:      Spouse name: None   • Number of children: None   • Years of education: None   • Highest education level: None   Occupational History   • None   Tobacco Use   • Smoking status: Former Smoker   • Smokeless tobacco: Never Used   Substance and Sexual Activity   • Alcohol use: Yes     Comment: rarely   • Drug use: Not Currently   • Sexual activity: Defer   Other Topics Concern   • None   Social History Narrative    José Miguel Shelley      Social Determinants of Health     Financial Resource Strain:    • Difficulty of Paying Living Expenses: Not on file   Food Insecurity: No Food Insecurity   • Worried About Running Out of Food in the Last Year: Never true   • Ran Out of Food in the Last Year: Never true   Transportation Needs:    • Lack of Transportation (Medical): Not on file   • Lack of Transportation (Non-Medical): Not on file   Physical Activity:    • Days of Exercise per Week: Not on file   • Minutes of Exercise per Session: Not on file   Stress:    • Feeling of Stress : Not on file   Social Connections:    • Frequency of Communication with Friends and Family: Not on file   • Frequency of Social Gatherings with Friends and Family: Not on file   • Attends Evangelical Services: Not on file   • Active Member of Clubs or Organizations: Not on file   • Attends Club or  Organization Meetings: Not on file   • Marital Status: Not on file   Intimate Partner Violence:    • Fear of Current or Ex-Partner: Not on file   • Emotionally Abused: Not on file   • Physically Abused: Not on file   • Sexually Abused: Not on file   Housing Stability:    • Unable to Pay for Housing in the Last Year: Not on file   • Number of Places Lived in the Last Year: Not on file   • Unstable Housing in the Last Year: Not on file       Family History   Problem Relation Age of Onset   • Hypertension Biological Mother    • Diabetes Biological Mother    • Hyperlipidemia Biological Father    • Heart attack Biological Father        Patient has no known allergies.    Current Outpatient Medications   Medication Sig Dispense Refill   • cyanocobalamin (VITAMIN B12) 1,000 mcg tablet Take 1 tablet (1,000 mcg total) by mouth daily. 90 tablet 3   • FREESTYLE JAKUB 14 DAY SENSOR kit See admin instr.     • glipiZIDE (GLUCOTROL XL) 5 mg 24 hr tablet Take 10 mg by mouth 2 (two) times a day with meals.       • Lactobacillus acidophilus (PROBIOTIC) 10 billion cell capsule Take 2 capsules by mouth daily.     • latanoprost (XALATAN) 0.005 % ophthalmic solution Administer 1 drop into both eyes nightly.     • metFORMIN (GLUCOPHAGE) 1,000 mg tablet Take 1,000 mg by mouth 2 (two) times a day.     • mirtazapine (REMERON) 15 mg tablet Take 1 tablet (15 mg total) by mouth nightly. 30 tablet 3   • simvastatin (ZOCOR) 20 mg tablet Take 20 mg by mouth nightly.       • timolol (TIMOPTIC) 0.5 % ophthalmic solution Administer 1 drop into both eyes daily.         No current facility-administered medications for this visit.       Review of Systems   Constitutional: Negative.    HENT: Negative.    Eyes: Negative.    Respiratory: Negative.    Cardiovascular: Negative.    Gastrointestinal: Negative.    Endocrine: Negative.    Genitourinary: Negative.    Musculoskeletal: Negative.    Skin: Negative.    Allergic/Immunologic: Negative.    Neurological:  Negative.    Hematological: Negative.    Psychiatric/Behavioral: Negative.           Vitals:    11/12/21 1440   BP: (!) 142/80   Pulse: 89   Resp: 16   Temp: (!) 35.9 °C (96.6 °F)   SpO2: 98%       Body mass index is 22.33 kg/m².      Physical Exam  Constitutional:       Appearance: Normal appearance.   Cardiovascular:      Rate and Rhythm: Normal rate and regular rhythm.      Pulses: Normal pulses.      Heart sounds: Normal heart sounds. No murmur heard.      Pulmonary:      Effort: Pulmonary effort is normal.      Breath sounds: Normal breath sounds. No wheezing or rhonchi.   Neurological:      General: No focal deficit present.      Mental Status: He is alert and oriented to person, place, and time.          Assessment/Plan      Diagnoses and all orders for this visit:    Type 2 diabetes mellitus without complication, without long-term current use of insulin (CMS/[facility]) (Primary)  -     CBC and differential; Future  -     Comprehensive metabolic panel; Future  -     Hemoglobin A1c; Future  -     Microalbumin / creatinine urine ratio; Future  -     Lipid panel; Future  Labs   D/c glipizide - add jardiance  Will add BP benefit  Encephalopathy  Improving related to B12 deficiency ?   B12 deficiency    Essential hypertension  jardiance  Other orders  -     empagliflozin 10 mg tablet; Take 1 tablet (10 mg total) by mouth daily.          No orders of the defined types were placed in this encounter.      There are no discontinued medications.     No orders of the defined types were placed in this encounter.       Olivier Melchor,   11/12/2021

## 2021-11-12 NOTE — LETTER
November 12, 2021     Patient: Stephane Kirby  YOB: 1970  Date of Visit: 11/12/2021    To Whom it May Concern:     Mr. Hector Kirby is a patient in our practice.  Mr. Kirby was seen on 11/12/21 and have agreed for him to return to work on 12/06/21.  Please feel free to contact me regarding this matter with any questions or concerns. Thank you for your time and attention to this matter. Take care.       Sincerely,         Olivier Melchor,         CC: No Recipients

## 2021-11-15 PROBLEM — R41.82 ALTERED MENTAL STATUS: Status: RESOLVED | Noted: 2021-08-08 | Resolved: 2021-11-15

## 2021-11-15 ASSESSMENT — ENCOUNTER SYMPTOMS
PSYCHIATRIC NEGATIVE: 1
ALLERGIC/IMMUNOLOGIC NEGATIVE: 1
HEMATOLOGIC/LYMPHATIC NEGATIVE: 1
NEUROLOGICAL NEGATIVE: 1
MUSCULOSKELETAL NEGATIVE: 1
ENDOCRINE NEGATIVE: 1
EYES NEGATIVE: 1
CARDIOVASCULAR NEGATIVE: 1
GASTROINTESTINAL NEGATIVE: 1
RESPIRATORY NEGATIVE: 1
CONSTITUTIONAL NEGATIVE: 1

## 2021-12-27 NOTE — TELEPHONE ENCOUNTER
Medicine Refill Request    Last Office: 11/12/2021   Last Consult Visit: Visit date not found  Last Telemedicine Visit: Visit date not found    Next Appointment: 2/25/2022      Current Outpatient Medications:   •  cyanocobalamin (VITAMIN B12) 1,000 mcg tablet, Take 1 tablet (1,000 mcg total) by mouth daily., Disp: 90 tablet, Rfl: 3  •  empagliflozin 10 mg tablet, Take 1 tablet (10 mg total) by mouth daily., Disp: 90 tablet, Rfl: 1  •  FREESTYLE JAKUB 14 DAY SENSOR kit, See admin instr., Disp: , Rfl:   •  Lactobacillus acidophilus (PROBIOTIC) 10 billion cell capsule, Take 2 capsules by mouth daily., Disp: , Rfl:   •  latanoprost (XALATAN) 0.005 % ophthalmic solution, Administer 1 drop into both eyes nightly., Disp: , Rfl:   •  metFORMIN (GLUCOPHAGE) 1,000 mg tablet, Take 1,000 mg by mouth 2 (two) times a day., Disp: , Rfl:   •  mirtazapine (REMERON) 15 mg tablet, Take 1 tablet (15 mg total) by mouth nightly., Disp: 30 tablet, Rfl: 3  •  simvastatin (ZOCOR) 20 mg tablet, Take 20 mg by mouth nightly.  , Disp: , Rfl:   •  timolol (TIMOPTIC) 0.5 % ophthalmic solution, Administer 1 drop into both eyes daily.  , Disp: , Rfl:       BP Readings from Last 3 Encounters:   11/12/21 (!) 142/80   09/14/21 126/76   08/10/21 (!) 146/82       Recent Lab results:  Lab Results   Component Value Date    CHOL 108 08/07/2021   ,   Lab Results   Component Value Date    HDL 30 (L) 08/07/2021   ,   Lab Results   Component Value Date    LDLCALC 69 08/07/2021   ,   Lab Results   Component Value Date    TRIG 45 08/07/2021        Lab Results   Component Value Date    GLUCOSE 176 (H) 08/09/2021   ,   Lab Results   Component Value Date    HGBA1C 7.8 (H) 08/06/2021         Lab Results   Component Value Date    CREATININE 1.1 08/09/2021       Lab Results   Component Value Date    TSH 1.57 08/06/2021

## 2021-12-27 NOTE — TELEPHONE ENCOUNTER
----- Message from Stephane Kirby sent at 12/26/2021 11:06 AM EST -----  Regarding: Refill   Good Morning and Happy Holidays I need a refill of simvastatin 20 mg tablet. I have 7 days left.    Please call into [facility] store # [phone]  Thank you

## 2021-12-28 RX ORDER — SIMVASTATIN 20 MG/1
20 TABLET, FILM COATED ORAL NIGHTLY
Qty: 90 TABLET | Refills: 1 | Status: SHIPPED | OUTPATIENT
Start: 2021-12-28 | End: 2022-08-29

## 2022-01-17 RX ORDER — METFORMIN HYDROCHLORIDE 1000 MG/1
1000 TABLET ORAL
Qty: 180 TABLET | Refills: 0 | Status: SHIPPED | OUTPATIENT
Start: 2022-01-17 | End: 2022-04-18

## 2022-01-17 NOTE — TELEPHONE ENCOUNTER
From: Stephane Kirby  To: Office of Olivier Melchor DO  Sent: 1/15/2022 3:35 PM EST  Subject: Medication Renewal Request    Refills have been requested for the following medications:   Other - Metformin     Preferred pharmacy: [facility] #[id number] - [place] - [address]  Delivery method: Pickup      Medication renewals requested in this message routed separately:     empagliflozin 10 mg tablet [Olivier Melchor DO]

## 2022-02-14 ENCOUNTER — APPOINTMENT (OUTPATIENT)
Dept: LAB | Facility: HOSPITAL | Age: 52
End: 2022-02-14
Attending: INTERNAL MEDICINE
Payer: COMMERCIAL

## 2022-02-14 DIAGNOSIS — E11.9 TYPE 2 DIABETES MELLITUS WITHOUT COMPLICATION, WITHOUT LONG-TERM CURRENT USE OF INSULIN (CMS/HCC): ICD-10-CM

## 2022-02-14 LAB
ALBUMIN SERPL-MCNC: 4.3 G/DL (ref 3.4–5)
ALBUMIN/CREAT UR: 9.4 UG/MG
ALP SERPL-CCNC: 58 IU/L (ref 35–126)
ALT SERPL-CCNC: 53 IU/L (ref 16–63)
ANION GAP SERPL CALC-SCNC: 12 MEQ/L (ref 3–15)
AST SERPL-CCNC: 50 IU/L (ref 15–41)
BASOPHILS # BLD: 0.02 K/UL (ref 0.01–0.1)
BASOPHILS NFR BLD: 0.3 %
BILIRUB SERPL-MCNC: 0.7 MG/DL (ref 0.3–1.2)
BUN SERPL-MCNC: 13 MG/DL (ref 8–20)
CALCIUM SERPL-MCNC: 9.7 MG/DL (ref 8.9–10.3)
CHLORIDE SERPL-SCNC: 100 MEQ/L (ref 98–109)
CHOLEST SERPL-MCNC: 132 MG/DL
CO2 SERPL-SCNC: 26 MEQ/L (ref 22–32)
CREAT SERPL-MCNC: 1.1 MG/DL (ref 0.8–1.3)
CREAT UR-MCNC: 95.8 MG/DL
DIFFERENTIAL METHOD BLD: ABNORMAL
EOSINOPHIL # BLD: 0.08 K/UL (ref 0.04–0.54)
EOSINOPHIL NFR BLD: 1.4 %
ERYTHROCYTE [DISTWIDTH] IN BLOOD BY AUTOMATED COUNT: 13.3 % (ref 11.6–14.4)
EST. AVERAGE GLUCOSE BLD GHB EST-MCNC: 171 MG/DL
GFR SERPL CREATININE-BSD FRML MDRD: >60 ML/MIN/1.73M*2
GLUCOSE SERPL-MCNC: 109 MG/DL (ref 70–99)
HBA1C MFR BLD HPLC: 7.6 %
HCT VFR BLDCO AUTO: 46.2 % (ref 40.1–51)
HDLC SERPL-MCNC: 43 MG/DL
HDLC SERPL: 3.1 {RATIO}
HGB BLD-MCNC: 14.4 G/DL (ref 13.7–17.5)
IMM GRANULOCYTES # BLD AUTO: 0.01 K/UL (ref 0–0.08)
IMM GRANULOCYTES NFR BLD AUTO: 0.2 %
LDLC SERPL CALC-MCNC: 78 MG/DL
LYMPHOCYTES # BLD: 2 K/UL (ref 1.2–3.5)
LYMPHOCYTES NFR BLD: 34.7 %
MCH RBC QN AUTO: 27.5 PG (ref 28–33.2)
MCHC RBC AUTO-ENTMCNC: 31.2 G/DL (ref 32.2–36.5)
MCV RBC AUTO: 88.3 FL (ref 83–98)
MICROALBUMIN UR-MCNC: 9 MG/L
MONOCYTES # BLD: 0.41 K/UL (ref 0.3–1)
MONOCYTES NFR BLD: 7.1 %
NEUTROPHILS # BLD: 3.25 K/UL (ref 1.7–7)
NEUTS SEG NFR BLD: 56.3 %
NONHDLC SERPL-MCNC: 89 MG/DL
NRBC BLD-RTO: 0 %
PDW BLD AUTO: 10.4 FL (ref 9.4–12.4)
PLATELET # BLD AUTO: 213 K/UL (ref 150–350)
POTASSIUM SERPL-SCNC: 4.4 MEQ/L (ref 3.6–5.1)
PROT SERPL-MCNC: 6.9 G/DL (ref 6–8.2)
RBC # BLD AUTO: 5.23 M/UL (ref 4.5–5.8)
SODIUM SERPL-SCNC: 138 MEQ/L (ref 136–144)
TRIGL SERPL-MCNC: 57 MG/DL (ref 30–149)
WBC # BLD AUTO: 5.77 K/UL (ref 3.8–10.5)

## 2022-02-14 PROCEDURE — 36415 COLL VENOUS BLD VENIPUNCTURE: CPT

## 2022-02-14 PROCEDURE — 82570 ASSAY OF URINE CREATININE: CPT

## 2022-02-14 PROCEDURE — 80053 COMPREHEN METABOLIC PANEL: CPT

## 2022-02-14 PROCEDURE — 82043 UR ALBUMIN QUANTITATIVE: CPT

## 2022-02-14 PROCEDURE — 85025 COMPLETE CBC W/AUTO DIFF WBC: CPT

## 2022-02-14 PROCEDURE — 83036 HEMOGLOBIN GLYCOSYLATED A1C: CPT

## 2022-02-14 PROCEDURE — 80061 LIPID PANEL: CPT

## 2022-02-25 ENCOUNTER — DOCTOR'S OFFICE (OUTPATIENT)
Dept: URBAN - METROPOLITAN AREA CLINIC 127 | Facility: CLINIC | Age: 52
Setting detail: OPHTHALMOLOGY
End: 2022-02-25
Payer: COMMERCIAL

## 2022-02-25 ENCOUNTER — OFFICE VISIT (OUTPATIENT)
Dept: FAMILY MEDICINE | Facility: CLINIC | Age: 52
End: 2022-02-25
Payer: COMMERCIAL

## 2022-02-25 VITALS
DIASTOLIC BLOOD PRESSURE: 70 MMHG | WEIGHT: 148 LBS | HEART RATE: 103 BPM | RESPIRATION RATE: 18 BRPM | BODY MASS INDEX: 21.19 KG/M2 | TEMPERATURE: 98.4 F | OXYGEN SATURATION: 97 % | HEIGHT: 70 IN | SYSTOLIC BLOOD PRESSURE: 120 MMHG

## 2022-02-25 DIAGNOSIS — I10 ESSENTIAL HYPERTENSION: Primary | ICD-10-CM

## 2022-02-25 DIAGNOSIS — H35.373: ICD-10-CM

## 2022-02-25 DIAGNOSIS — G93.40 ENCEPHALOPATHY: ICD-10-CM

## 2022-02-25 DIAGNOSIS — E11.9 TYPE 2 DIABETES MELLITUS WITHOUT COMPLICATION, WITHOUT LONG-TERM CURRENT USE OF INSULIN (CMS/HCC): ICD-10-CM

## 2022-02-25 DIAGNOSIS — H40.1132: ICD-10-CM

## 2022-02-25 DIAGNOSIS — H50.15: ICD-10-CM

## 2022-02-25 DIAGNOSIS — E11.3293: ICD-10-CM

## 2022-02-25 DIAGNOSIS — H04.123: ICD-10-CM

## 2022-02-25 DIAGNOSIS — H01.001: ICD-10-CM

## 2022-02-25 PROCEDURE — 92014 COMPRE OPH EXAM EST PT 1/>: CPT | Performed by: OPHTHALMOLOGY

## 2022-02-25 PROCEDURE — 3074F SYST BP LT 130 MM HG: CPT | Performed by: INTERNAL MEDICINE

## 2022-02-25 PROCEDURE — 99214 OFFICE O/P EST MOD 30 MIN: CPT | Performed by: INTERNAL MEDICINE

## 2022-02-25 PROCEDURE — 92134 CPTRZ OPH DX IMG PST SGM RTA: CPT | Performed by: OPHTHALMOLOGY

## 2022-02-25 PROCEDURE — 3008F BODY MASS INDEX DOCD: CPT | Performed by: INTERNAL MEDICINE

## 2022-02-25 PROCEDURE — 92020 GONIOSCOPY: CPT | Performed by: OPHTHALMOLOGY

## 2022-02-25 PROCEDURE — 3078F DIAST BP <80 MM HG: CPT | Performed by: INTERNAL MEDICINE

## 2022-02-25 PROCEDURE — 92133 CPTRZD OPH DX IMG PST SGM ON: CPT | Performed by: OPHTHALMOLOGY

## 2022-02-25 ASSESSMENT — REFRACTION_MANIFEST
OS_VA1: 20/30
OS_CYLINDER: +0.50
OD_SPHERE: +2.50
OS_ADD: +2.50
OS_SPHERE: +1.25
OD_SPHERE: +1.00
OS_SPHERE: +2.50
OD_VA1: 20/25
OD_ADD: +2.50
OS_AXIS: 95

## 2022-02-25 ASSESSMENT — REFRACTION_CURRENTRX
OS_AXIS: 93
OS_ADD: +1.50
OD_CYLINDER: +0.75
OD_ADD: +1.50
OS_CYLINDER: +0.75
OS_OVR_VA: 20/
OD_OVR_VA: 20/
OD_SPHERE: +0.75
OD_AXIS: 175
OS_SPHERE: +1.25

## 2022-02-25 ASSESSMENT — TEAR BREAK UP TIME (TBUT)
OD_TBUT: 1+
OS_TBUT: 1+

## 2022-02-25 ASSESSMENT — LID EXAM ASSESSMENTS
OD_BLEPHARITIS: 2+
OS_BLEPHARITIS: 2+

## 2022-02-25 ASSESSMENT — VISUAL ACUITY
OD_BCVA: 20/30
OS_BCVA: 20/30

## 2022-02-25 ASSESSMENT — SUPERFICIAL PUNCTATE KERATITIS (SPK)
OD_SPK: 1+
OS_SPK: 1+

## 2022-02-25 ASSESSMENT — SPHEQUIV_DERIVED: OS_SPHEQUIV: 1.5

## 2022-02-25 ASSESSMENT — CONFRONTATIONAL VISUAL FIELD TEST (CVF)
OD_FINDINGS: FULL
OS_FINDINGS: FULL

## 2022-02-25 NOTE — PATIENT INSTRUCTIONS
Ok to stop Remeron   No changes for Diabetes - but still want to see improvement in A1c  Follow up 4-6 months

## 2022-03-05 ASSESSMENT — ENCOUNTER SYMPTOMS
RESPIRATORY NEGATIVE: 1
CONSTITUTIONAL NEGATIVE: 1
MUSCULOSKELETAL NEGATIVE: 1
ALLERGIC/IMMUNOLOGIC NEGATIVE: 1
HEMATOLOGIC/LYMPHATIC NEGATIVE: 1
EYES NEGATIVE: 1
PSYCHIATRIC NEGATIVE: 1
ENDOCRINE NEGATIVE: 1
NEUROLOGICAL NEGATIVE: 1
GASTROINTESTINAL NEGATIVE: 1
CARDIOVASCULAR NEGATIVE: 1

## 2022-03-05 NOTE — PROGRESS NOTES
Stephane Kirby is a 51 y.o. male     52 y/o male presents for follow up   Reviewed labs   A1c at goal   Feeling well  No change in vision     Tolerating medications   HTN - no headaches, dizziness, lightheadedness, CP  Checking at home   Tolerating meds without side effects     No confusion   Feels back to self   Wants to stop remeron     Still taking b12            Past Medical History:   Diagnosis Date   • Glaucoma    • Lipid disorder    • Type 2 diabetes mellitus (CMS/HCC)        Past Surgical History:   Procedure Laterality Date   • EYE SURGERY     • GLAUCOMA SURGERY Bilateral         Social History     Socioeconomic History   • Marital status:      Spouse name: None   • Number of children: None   • Years of education: None   • Highest education level: None   Occupational History   • None   Tobacco Use   • Smoking status: Former Smoker   • Smokeless tobacco: Never Used   Vaping Use   • Vaping Use: Never used   Substance and Sexual Activity   • Alcohol use: Not Currently     Comment: rarely   • Drug use: Not Currently   • Sexual activity: Defer   Other Topics Concern   • None   Social History Narrative    Septa       Social Determinants of Health     Financial Resource Strain: Not on file   Food Insecurity: No Food Insecurity   • Worried About Running Out of Food in the Last Year: Never true   • Ran Out of Food in the Last Year: Never true   Transportation Needs: Not on file   Physical Activity: Not on file   Stress: Not on file   Social Connections: Not on file   Intimate Partner Violence: Not on file   Housing Stability: Not on file       Family History   Problem Relation Age of Onset   • Hypertension Biological Mother    • Diabetes Biological Mother    • Hyperlipidemia Biological Father    • Heart attack Biological Father        Patient has no known allergies.    Current Outpatient Medications   Medication Sig Dispense Refill   • cyanocobalamin (VITAMIN B12) 1,000 mcg tablet Take 1 tablet (1,000  mcg total) by mouth daily. 90 tablet 3   • empagliflozin 10 mg tablet Take 1 tablet (10 mg total) by mouth daily. 90 tablet 1   • FREESTYLE JAKUB 14 DAY SENSOR kit See admin instr.     • Lactobacillus acidophilus (PROBIOTIC) 10 billion cell capsule Take 2 capsules by mouth daily.     • latanoprost (XALATAN) 0.005 % ophthalmic solution Administer 1 drop into both eyes nightly.     • metFORMIN 1,000 mg tablet Take 1 tablet (1,000 mg total) by mouth 2 (two) times a day. 180 tablet 0   • simvastatin 20 mg tablet Take 1 tablet (20 mg total) by mouth nightly. 90 tablet 1   • timolol (TIMOPTIC) 0.5 % ophthalmic solution Administer 1 drop into both eyes daily.         No current facility-administered medications for this visit.       Review of Systems   Constitutional: Negative.    HENT: Negative.    Eyes: Negative.    Respiratory: Negative.    Cardiovascular: Negative.    Gastrointestinal: Negative.    Endocrine: Negative.    Genitourinary: Negative.    Musculoskeletal: Negative.    Skin: Negative.    Allergic/Immunologic: Negative.    Neurological: Negative.    Hematological: Negative.    Psychiatric/Behavioral: Negative.           Vitals:    02/25/22 1317   BP: 120/70   Pulse: (!) 103   Resp: 18   Temp: 36.9 °C (98.4 °F)   SpO2: 97%       Body mass index is 21.24 kg/m².      Physical Exam  Constitutional:       Appearance: Normal appearance.   HENT:      Right Ear: Tympanic membrane, ear canal and external ear normal.      Left Ear: Tympanic membrane, ear canal and external ear normal.   Eyes:      Extraocular Movements: Extraocular movements intact.      Conjunctiva/sclera: Conjunctivae normal.      Pupils: Pupils are equal, round, and reactive to light.      Comments: anicteric no pallor         Cardiovascular:      Rate and Rhythm: Normal rate and regular rhythm.      Pulses: Normal pulses.      Heart sounds: Normal heart sounds. No murmur heard.  Pulmonary:      Effort: Pulmonary effort is normal.      Breath sounds:  Normal breath sounds. No wheezing or rhonchi.   Abdominal:      General: Abdomen is flat. Bowel sounds are normal.      Palpations: Abdomen is soft. There is no mass.      Tenderness: There is no abdominal tenderness.   Musculoskeletal:      Right lower leg: No edema.      Left lower leg: No edema.   Neurological:      General: No focal deficit present.      Mental Status: He is alert and oriented to person, place, and time.   Psychiatric:         Mood and Affect: Mood normal.         Behavior: Behavior normal.          Assessment/Plan      Diagnoses and all orders for this visit:    Essential hypertension (Primary)  At goal improved with jardiance   Type 2 diabetes mellitus without complication, without long-term current use of insulin (CMS/[facility])  -     Hemoglobin A1c; Future  -     Comprehensive metabolic panel; Future  -     CBC and differential; Future  -     Lipid panel; Future  At goal A1c   Follow up 4-6 months   Labs prior   Encephalopathy  b12   Ok to stop remeron and monitor status        No orders of the defined types were placed in this encounter.      Medications Discontinued During This Encounter   Medication Reason   • mirtazapine (REMERON) 15 mg tablet         Orders Placed This Encounter   Procedures   • Hemoglobin A1c     Standing Status:   Future     Number of Occurrences:   1     Standing Expiration Date:   2/25/2023     Order Specific Question:   Release to patient     Answer:   Immediate   • Comprehensive metabolic panel     Standing Status:   Future     Number of Occurrences:   1     Standing Expiration Date:   2/25/2023     Order Specific Question:   Release to patient     Answer:   Immediate   • CBC and differential     Standing Status:   Future     Number of Occurrences:   1     Standing Expiration Date:   2/25/2023     Order Specific Question:   Release to patient     Answer:   Immediate   • Lipid panel     Standing Status:   Future     Number of Occurrences:   1     Standing Expiration  Date:   2/25/2023     Order Specific Question:   Release to patient     Answer:   Immediate        Olivier Melchor DO  3/5/2022

## 2022-03-11 ENCOUNTER — DOCUMENTATION (OUTPATIENT)
Dept: FAMILY MEDICINE | Facility: CLINIC | Age: 52
End: 2022-03-11
Payer: COMMERCIAL

## 2022-03-11 NOTE — PROGRESS NOTES
Jes Harley MA has completed a chart review for Stephane Kirby and have determined that the care gap has been satisfied.    Care Gap Source: [facility]    Care Gap(s) Identified: Diabetic Hemoglobin A1c,Diabetic Nephropathy Screening    Chart Review Completed: Yes    Hgb A1c completed on 2/14/22, value of 7.6.    Urine microalbumin completed on 2/14/22.

## 2022-04-11 NOTE — TELEPHONE ENCOUNTER
Medicine Refill Request    Last Office: 2/25/2022   Last Consult Visit: Visit date not found  Last Telemedicine Visit: Visit date not found    Next Appointment: 9/2/2022      Current Outpatient Medications:   •  cyanocobalamin (VITAMIN B12) 1,000 mcg tablet, Take 1 tablet (1,000 mcg total) by mouth daily., Disp: 90 tablet, Rfl: 3  •  empagliflozin 10 mg tablet, Take 1 tablet (10 mg total) by mouth daily., Disp: 90 tablet, Rfl: 1  •  FREESTYLE JAKUB 14 DAY SENSOR kit, See admin instr., Disp: , Rfl:   •  Lactobacillus acidophilus (PROBIOTIC) 10 billion cell capsule, Take 2 capsules by mouth daily., Disp: , Rfl:   •  latanoprost (XALATAN) 0.005 % ophthalmic solution, Administer 1 drop into both eyes nightly., Disp: , Rfl:   •  metFORMIN 1,000 mg tablet, Take 1 tablet (1,000 mg total) by mouth 2 (two) times a day., Disp: 180 tablet, Rfl: 0  •  simvastatin 20 mg tablet, Take 1 tablet (20 mg total) by mouth nightly., Disp: 90 tablet, Rfl: 1  •  timolol (TIMOPTIC) 0.5 % ophthalmic solution, Administer 1 drop into both eyes daily.  , Disp: , Rfl:       BP Readings from Last 3 Encounters:   02/25/22 120/70   11/12/21 (!) 142/80   09/14/21 126/76       Recent Lab results:  Lab Results   Component Value Date    CHOL 132 02/14/2022   ,   Lab Results   Component Value Date    HDL 43 (L) 02/14/2022   ,   Lab Results   Component Value Date    LDLCALC 78 02/14/2022   ,   Lab Results   Component Value Date    TRIG 57 02/14/2022        Lab Results   Component Value Date    GLUCOSE 109 (H) 02/14/2022   ,   Lab Results   Component Value Date    HGBA1C 7.6 (H) 02/14/2022         Lab Results   Component Value Date    CREATININE 1.1 02/14/2022       Lab Results   Component Value Date    TSH 1.57 08/06/2021

## 2022-04-12 RX ORDER — MIRTAZAPINE 15 MG/1
TABLET, FILM COATED ORAL
Qty: 90 TABLET | Refills: 0 | OUTPATIENT
Start: 2022-04-12

## 2022-04-12 RX ORDER — EMPAGLIFLOZIN 10 MG/1
TABLET, FILM COATED ORAL
Qty: 90 TABLET | Refills: 1 | Status: SHIPPED | OUTPATIENT
Start: 2022-04-12 | End: 2022-10-06

## 2022-08-04 ENCOUNTER — APPOINTMENT (OUTPATIENT)
Dept: LAB | Facility: HOSPITAL | Age: 52
End: 2022-08-04
Attending: INTERNAL MEDICINE
Payer: COMMERCIAL

## 2022-08-04 DIAGNOSIS — E11.9 TYPE 2 DIABETES MELLITUS WITHOUT COMPLICATION, WITHOUT LONG-TERM CURRENT USE OF INSULIN (CMS/HCC): ICD-10-CM

## 2022-08-04 LAB
ALBUMIN SERPL-MCNC: 3.9 G/DL (ref 3.4–5)
ALP SERPL-CCNC: 50 IU/L (ref 35–126)
ALT SERPL-CCNC: 17 IU/L (ref 16–63)
ANION GAP SERPL CALC-SCNC: 8 MEQ/L (ref 3–15)
AST SERPL-CCNC: 18 IU/L (ref 15–41)
BASOPHILS # BLD: 0.03 K/UL (ref 0.01–0.1)
BASOPHILS NFR BLD: 0.6 %
BILIRUB SERPL-MCNC: 0.8 MG/DL (ref 0.3–1.2)
BUN SERPL-MCNC: 12 MG/DL (ref 8–20)
CALCIUM SERPL-MCNC: 9.2 MG/DL (ref 8.9–10.3)
CHLORIDE SERPL-SCNC: 99 MEQ/L (ref 98–109)
CHOLEST SERPL-MCNC: 126 MG/DL
CO2 SERPL-SCNC: 26 MEQ/L (ref 22–32)
CREAT SERPL-MCNC: 1.2 MG/DL (ref 0.8–1.3)
DIFFERENTIAL METHOD BLD: ABNORMAL
EOSINOPHIL # BLD: 0.19 K/UL (ref 0.04–0.54)
EOSINOPHIL NFR BLD: 3.8 %
ERYTHROCYTE [DISTWIDTH] IN BLOOD BY AUTOMATED COUNT: 13.2 % (ref 11.6–14.4)
EST. AVERAGE GLUCOSE BLD GHB EST-MCNC: 171 MG/DL
GFR SERPL CREATININE-BSD FRML MDRD: >60 ML/MIN/1.73M*2
GLUCOSE SERPL-MCNC: 204 MG/DL (ref 70–99)
HBA1C MFR BLD HPLC: 7.6 %
HCT VFR BLDCO AUTO: 40.3 % (ref 40.1–51)
HDLC SERPL-MCNC: 40 MG/DL
HDLC SERPL: 3.2 {RATIO}
HGB BLD-MCNC: 12.9 G/DL (ref 13.7–17.5)
IMM GRANULOCYTES # BLD AUTO: 0.01 K/UL (ref 0–0.08)
IMM GRANULOCYTES NFR BLD AUTO: 0.2 %
LDLC SERPL CALC-MCNC: 75 MG/DL
LYMPHOCYTES # BLD: 1.72 K/UL (ref 1.2–3.5)
LYMPHOCYTES NFR BLD: 34.1 %
MCH RBC QN AUTO: 28.5 PG (ref 28–33.2)
MCHC RBC AUTO-ENTMCNC: 32 G/DL (ref 32.2–36.5)
MCV RBC AUTO: 89 FL (ref 83–98)
MONOCYTES # BLD: 0.44 K/UL (ref 0.3–1)
MONOCYTES NFR BLD: 8.7 %
NEUTROPHILS # BLD: 2.65 K/UL (ref 1.7–7)
NEUTS SEG NFR BLD: 52.6 %
NONHDLC SERPL-MCNC: 86 MG/DL
NRBC BLD-RTO: 0 %
PDW BLD AUTO: 11.1 FL (ref 9.4–12.4)
PLATELET # BLD AUTO: 181 K/UL (ref 150–350)
POTASSIUM SERPL-SCNC: 3.8 MEQ/L (ref 3.6–5.1)
PROT SERPL-MCNC: 6.1 G/DL (ref 6–8.2)
RBC # BLD AUTO: 4.53 M/UL (ref 4.5–5.8)
SODIUM SERPL-SCNC: 133 MEQ/L (ref 136–144)
TRIGL SERPL-MCNC: 55 MG/DL (ref 30–149)
WBC # BLD AUTO: 5.04 K/UL (ref 3.8–10.5)

## 2022-08-04 PROCEDURE — 80061 LIPID PANEL: CPT

## 2022-08-04 PROCEDURE — 80053 COMPREHEN METABOLIC PANEL: CPT

## 2022-08-04 PROCEDURE — 36415 COLL VENOUS BLD VENIPUNCTURE: CPT

## 2022-08-04 PROCEDURE — 83036 HEMOGLOBIN GLYCOSYLATED A1C: CPT

## 2022-08-04 PROCEDURE — 85025 COMPLETE CBC W/AUTO DIFF WBC: CPT

## 2022-09-02 ENCOUNTER — DOCTOR'S OFFICE (OUTPATIENT)
Dept: URBAN - METROPOLITAN AREA CLINIC 127 | Facility: CLINIC | Age: 52
Setting detail: OPHTHALMOLOGY
End: 2022-09-02
Payer: COMMERCIAL

## 2022-09-02 ENCOUNTER — OFFICE VISIT (OUTPATIENT)
Dept: FAMILY MEDICINE | Facility: CLINIC | Age: 52
End: 2022-09-02
Payer: COMMERCIAL

## 2022-09-02 VITALS
BODY MASS INDEX: 20.62 KG/M2 | WEIGHT: 144 LBS | HEIGHT: 70 IN | DIASTOLIC BLOOD PRESSURE: 72 MMHG | HEART RATE: 88 BPM | SYSTOLIC BLOOD PRESSURE: 122 MMHG | OXYGEN SATURATION: 99 %

## 2022-09-02 DIAGNOSIS — E78.5 DYSLIPIDEMIA: ICD-10-CM

## 2022-09-02 DIAGNOSIS — H50.15: ICD-10-CM

## 2022-09-02 DIAGNOSIS — H04.123: ICD-10-CM

## 2022-09-02 DIAGNOSIS — Z00.00 PREVENTATIVE HEALTH CARE: Primary | ICD-10-CM

## 2022-09-02 DIAGNOSIS — E11.9 TYPE 2 DIABETES MELLITUS WITHOUT COMPLICATION, WITHOUT LONG-TERM CURRENT USE OF INSULIN (CMS/HCC): ICD-10-CM

## 2022-09-02 DIAGNOSIS — H40.1132: ICD-10-CM

## 2022-09-02 DIAGNOSIS — E11.3293: ICD-10-CM

## 2022-09-02 DIAGNOSIS — I10 ESSENTIAL HYPERTENSION: ICD-10-CM

## 2022-09-02 DIAGNOSIS — H01.001: ICD-10-CM

## 2022-09-02 DIAGNOSIS — H35.373: ICD-10-CM

## 2022-09-02 PROCEDURE — 92014 COMPRE OPH EXAM EST PT 1/>: CPT | Performed by: OPHTHALMOLOGY

## 2022-09-02 PROCEDURE — 92083 EXTENDED VISUAL FIELD XM: CPT | Performed by: OPHTHALMOLOGY

## 2022-09-02 PROCEDURE — 99396 PREV VISIT EST AGE 40-64: CPT | Performed by: INTERNAL MEDICINE

## 2022-09-02 PROCEDURE — 3074F SYST BP LT 130 MM HG: CPT | Performed by: INTERNAL MEDICINE

## 2022-09-02 PROCEDURE — 3078F DIAST BP <80 MM HG: CPT | Performed by: INTERNAL MEDICINE

## 2022-09-02 PROCEDURE — 3008F BODY MASS INDEX DOCD: CPT | Performed by: INTERNAL MEDICINE

## 2022-09-02 ASSESSMENT — REFRACTION_CURRENTRX
OD_AXIS: 175
OS_CYLINDER: +0.75
OS_OVR_VA: 20/
OD_ADD: +1.50
OD_SPHERE: +0.75
OS_AXIS: 93
OD_OVR_VA: 20/
OS_SPHERE: +1.25
OD_CYLINDER: +0.75
OS_ADD: +1.50

## 2022-09-02 ASSESSMENT — REFRACTION_MANIFEST
OS_AXIS: 95
OD_ADD: +2.50
OS_ADD: +2.50
OD_SPHERE: +1.00
OD_VA1: 20/25
OS_CYLINDER: +0.50
OD_SPHERE: +2.50
OS_SPHERE: +1.25
OS_SPHERE: +2.50
OS_VA1: 20/30

## 2022-09-02 ASSESSMENT — TEAR BREAK UP TIME (TBUT)
OD_TBUT: 1+
OS_TBUT: 1+

## 2022-09-02 ASSESSMENT — VISUAL ACUITY
OD_BCVA: 20/30
OS_BCVA: 20/30

## 2022-09-02 ASSESSMENT — PACHYMETRY
OD_CT_UM: 490
OD_CT_CORRECTION: 4
OS_CT_UM: 491
OS_CT_CORRECTION: 4

## 2022-09-02 ASSESSMENT — LID EXAM ASSESSMENTS
OD_BLEPHARITIS: 2+
OS_BLEPHARITIS: 2+

## 2022-09-02 ASSESSMENT — SPHEQUIV_DERIVED: OS_SPHEQUIV: 1.5

## 2022-09-02 ASSESSMENT — SUPERFICIAL PUNCTATE KERATITIS (SPK)
OS_SPK: 1+
OD_SPK: 1+

## 2022-09-02 ASSESSMENT — CONFRONTATIONAL VISUAL FIELD TEST (CVF)
OS_FINDINGS: FULL
OD_FINDINGS: FULL

## 2022-09-02 NOTE — PROGRESS NOTES
Stephane Kirby is a 52 y.o. male     53 y/o male presents for follow up / CPE   Feeling well   Reviewed labs   A1c just above goal     No issues concerns  HTN - no headaches, dizziness, lightheadedness, CP  Checking at home   Tolerating meds without side effects     Vision no issues stable prescription   Dental  q 6 months  NO CP, SOB   Activity very active at work  Memory no cognitive concerns  Mood good  Bowel, bladder no issues  Diet no discretions  Sleep no issues           Past Medical History:   Diagnosis Date    Glaucoma     Lipid disorder     Type 2 diabetes mellitus (CMS/HCC)        Past Surgical History:   Procedure Laterality Date    EYE SURGERY      GLAUCOMA SURGERY Bilateral         Social History     Socioeconomic History    Marital status:      Spouse name: None    Number of children: None    Years of education: None    Highest education level: None   Tobacco Use    Smoking status: Former Smoker    Smokeless tobacco: Never Used   Vaping Use    Vaping Use: Never used   Substance and Sexual Activity    Alcohol use: Not Currently     Comment: rarely    Drug use: Not Currently    Sexual activity: Defer   Social History Narrative    Septa         Family History   Problem Relation Age of Onset    Hypertension Biological Mother     Diabetes Biological Mother     Hyperlipidemia Biological Father     Heart attack Biological Father        Patient has no known allergies.    Current Outpatient Medications   Medication Sig Dispense Refill    cyanocobalamin (VITAMIN B12) 1,000 mcg tablet Take 1 tablet (1,000 mcg total) by mouth daily. 90 tablet 3    FREESTYLE JAKUB 14 DAY SENSOR kit See admin instr.      JARDIANCE 10 mg tablet TAKE 1 TABLET BY MOUTH EVERY DAY 90 tablet 1    Lactobacillus acidophilus (PROBIOTIC) 10 billion cell capsule Take 2 capsules by mouth daily.      metFORMIN (GLUCOPHAGE) 1,000 mg tablet TAKE 1 TABLET BY MOUTH TWICE A  tablet 1    simvastatin  (ZOCOR) 20 mg tablet TAKE 1 TABLET BY MOUTH EVERY DAY AT NIGHT 90 tablet 1    timolol (TIMOPTIC) 0.5 % ophthalmic solution Administer 1 drop into both eyes daily.        latanoprost (XALATAN) 0.005 % ophthalmic solution Administer 1 drop into both eyes nightly.       No current facility-administered medications for this visit.       Review of Systems   Constitutional: Negative.    HENT: Negative.    Eyes: Negative.    Respiratory: Negative.    Cardiovascular: Negative.    Gastrointestinal: Negative.    Endocrine: Negative.    Genitourinary: Negative.    Musculoskeletal: Negative.    Skin: Negative.    Allergic/Immunologic: Negative.    Neurological: Negative.    Hematological: Negative.    Psychiatric/Behavioral: Negative.           Vitals:    09/02/22 1300   BP: 130/72   Pulse: 88   SpO2: 99%       Body mass index is 20.66 kg/m².      Physical Exam  Constitutional:       Appearance: Normal appearance.   HENT:      Right Ear: Tympanic membrane, ear canal and external ear normal.      Left Ear: Tympanic membrane, ear canal and external ear normal.   Eyes:      Extraocular Movements: Extraocular movements intact.      Conjunctiva/sclera: Conjunctivae normal.      Pupils: Pupils are equal, round, and reactive to light.      Comments: anicteric no pallor         Cardiovascular:      Rate and Rhythm: Normal rate and regular rhythm.      Pulses: Normal pulses.      Heart sounds: Normal heart sounds. No murmur heard.  Pulmonary:      Effort: Pulmonary effort is normal.      Breath sounds: Normal breath sounds. No wheezing or rhonchi.   Abdominal:      General: Abdomen is flat. Bowel sounds are normal.      Palpations: Abdomen is soft. There is no mass.      Tenderness: There is no abdominal tenderness.   Musculoskeletal:      Right lower leg: No edema.      Left lower leg: No edema.   Neurological:      General: No focal deficit present.      Mental Status: He is alert and oriented to person, place, and time.    Psychiatric:         Mood and Affect: Mood normal.         Behavior: Behavior normal.          Assessment/Plan      Diagnoses and all orders for this visit:    Preventative health care (Primary)  Immunizations UTD including COVID vaccine with booster   [place] UTD   Follow up labs   Type 2 diabetes mellitus without complication, without long-term current use of insulin (CMS/[facility])  -     CBC and differential; Future  -     Comprehensive metabolic panel; Future  -     Hemoglobin A1c; Future  -     Lipid panel; Future  Labs in 3-4 months   If A1c remains unchanged - augment therapy   Essential hypertension  At goal   Dyslipidemia  statin        No orders of the defined types were placed in this encounter.      There are no discontinued medications.     No orders of the defined types were placed in this encounter.       Olivier Melchor, DO  9/2/2022

## 2022-09-05 ASSESSMENT — ENCOUNTER SYMPTOMS
CONSTITUTIONAL NEGATIVE: 1
CARDIOVASCULAR NEGATIVE: 1
ALLERGIC/IMMUNOLOGIC NEGATIVE: 1
HEMATOLOGIC/LYMPHATIC NEGATIVE: 1
NEUROLOGICAL NEGATIVE: 1
PSYCHIATRIC NEGATIVE: 1
GASTROINTESTINAL NEGATIVE: 1
RESPIRATORY NEGATIVE: 1
ENDOCRINE NEGATIVE: 1
MUSCULOSKELETAL NEGATIVE: 1
EYES NEGATIVE: 1

## 2022-12-02 ENCOUNTER — APPOINTMENT (OUTPATIENT)
Dept: LAB | Facility: HOSPITAL | Age: 52
End: 2022-12-02
Attending: INTERNAL MEDICINE
Payer: COMMERCIAL

## 2022-12-02 ENCOUNTER — OFFICE VISIT (OUTPATIENT)
Dept: FAMILY MEDICINE | Facility: CLINIC | Age: 52
End: 2022-12-02
Payer: COMMERCIAL

## 2022-12-02 VITALS
RESPIRATION RATE: 16 BRPM | SYSTOLIC BLOOD PRESSURE: 130 MMHG | TEMPERATURE: 98.1 F | BODY MASS INDEX: 20.33 KG/M2 | HEART RATE: 81 BPM | WEIGHT: 142 LBS | DIASTOLIC BLOOD PRESSURE: 72 MMHG | OXYGEN SATURATION: 99 % | HEIGHT: 70 IN

## 2022-12-02 DIAGNOSIS — Z00.00 PREVENTATIVE HEALTH CARE: ICD-10-CM

## 2022-12-02 DIAGNOSIS — I10 ESSENTIAL HYPERTENSION: ICD-10-CM

## 2022-12-02 DIAGNOSIS — E11.9 TYPE 2 DIABETES MELLITUS WITHOUT COMPLICATION, WITHOUT LONG-TERM CURRENT USE OF INSULIN (CMS/HCC): Primary | ICD-10-CM

## 2022-12-02 DIAGNOSIS — E78.5 DYSLIPIDEMIA: ICD-10-CM

## 2022-12-02 DIAGNOSIS — E11.9 TYPE 2 DIABETES MELLITUS WITHOUT COMPLICATION, WITHOUT LONG-TERM CURRENT USE OF INSULIN (CMS/HCC): ICD-10-CM

## 2022-12-02 LAB
ALBUMIN SERPL-MCNC: 3.8 G/DL (ref 3.4–5)
ALP SERPL-CCNC: 60 IU/L (ref 35–126)
ALT SERPL-CCNC: 21 IU/L (ref 16–63)
ANION GAP SERPL CALC-SCNC: 10 MEQ/L (ref 3–15)
AST SERPL-CCNC: 20 IU/L (ref 15–41)
BASOPHILS # BLD: 0.02 K/UL (ref 0.01–0.1)
BASOPHILS NFR BLD: 0.4 %
BILIRUB SERPL-MCNC: 0.5 MG/DL (ref 0.3–1.2)
BUN SERPL-MCNC: 11 MG/DL (ref 8–20)
CALCIUM SERPL-MCNC: 9.3 MG/DL (ref 8.9–10.3)
CHLORIDE SERPL-SCNC: 102 MEQ/L (ref 98–109)
CHOLEST SERPL-MCNC: 143 MG/DL
CO2 SERPL-SCNC: 26 MEQ/L (ref 22–32)
CREAT SERPL-MCNC: 1.1 MG/DL (ref 0.8–1.3)
DIFFERENTIAL METHOD BLD: ABNORMAL
EOSINOPHIL # BLD: 0.17 K/UL (ref 0.04–0.54)
EOSINOPHIL NFR BLD: 3.8 %
ERYTHROCYTE [DISTWIDTH] IN BLOOD BY AUTOMATED COUNT: 13.2 % (ref 11.6–14.4)
EST. AVERAGE GLUCOSE BLD GHB EST-MCNC: 180 MG/DL
GFR SERPL CREATININE-BSD FRML MDRD: >60 ML/MIN/1.73M*2
GLUCOSE SERPL-MCNC: 151 MG/DL (ref 70–99)
HBA1C MFR BLD HPLC: 7.9 %
HCT VFR BLDCO AUTO: 43.4 % (ref 40.1–51)
HDLC SERPL-MCNC: 44 MG/DL
HDLC SERPL: 3.3 {RATIO}
HGB BLD-MCNC: 13.5 G/DL (ref 13.7–17.5)
IMM GRANULOCYTES # BLD AUTO: 0.01 K/UL (ref 0–0.08)
IMM GRANULOCYTES NFR BLD AUTO: 0.2 %
LDLC SERPL CALC-MCNC: 88 MG/DL
LYMPHOCYTES # BLD: 1.47 K/UL (ref 1.2–3.5)
LYMPHOCYTES NFR BLD: 32.6 %
MCH RBC QN AUTO: 28.1 PG (ref 28–33.2)
MCHC RBC AUTO-ENTMCNC: 31.1 G/DL (ref 32.2–36.5)
MCV RBC AUTO: 90.2 FL (ref 83–98)
MONOCYTES # BLD: 0.45 K/UL (ref 0.3–1)
MONOCYTES NFR BLD: 10 %
NEUTROPHILS # BLD: 2.39 K/UL (ref 1.7–7)
NEUTS SEG NFR BLD: 53 %
NONHDLC SERPL-MCNC: 99 MG/DL
NRBC BLD-RTO: 0 %
PDW BLD AUTO: 9.9 FL (ref 9.4–12.4)
PLATELET # BLD AUTO: 194 K/UL (ref 150–350)
POTASSIUM SERPL-SCNC: 4.3 MEQ/L (ref 3.6–5.1)
PROT SERPL-MCNC: 5.9 G/DL (ref 6–8.2)
RBC # BLD AUTO: 4.81 M/UL (ref 4.5–5.8)
SODIUM SERPL-SCNC: 138 MEQ/L (ref 136–144)
TRIGL SERPL-MCNC: 56 MG/DL (ref 30–149)
WBC # BLD AUTO: 4.51 K/UL (ref 3.8–10.5)

## 2022-12-02 PROCEDURE — 3008F BODY MASS INDEX DOCD: CPT | Performed by: INTERNAL MEDICINE

## 2022-12-02 PROCEDURE — 85025 COMPLETE CBC W/AUTO DIFF WBC: CPT

## 2022-12-02 PROCEDURE — 83036 HEMOGLOBIN GLYCOSYLATED A1C: CPT

## 2022-12-02 PROCEDURE — 99214 OFFICE O/P EST MOD 30 MIN: CPT | Performed by: INTERNAL MEDICINE

## 2022-12-02 PROCEDURE — 3075F SYST BP GE 130 - 139MM HG: CPT | Performed by: INTERNAL MEDICINE

## 2022-12-02 PROCEDURE — 3078F DIAST BP <80 MM HG: CPT | Performed by: INTERNAL MEDICINE

## 2022-12-02 PROCEDURE — 80061 LIPID PANEL: CPT

## 2022-12-02 PROCEDURE — 80053 COMPREHEN METABOLIC PANEL: CPT

## 2022-12-02 PROCEDURE — 36415 COLL VENOUS BLD VENIPUNCTURE: CPT

## 2022-12-02 ASSESSMENT — PATIENT HEALTH QUESTIONNAIRE - PHQ9: SUM OF ALL RESPONSES TO PHQ9 QUESTIONS 1 & 2: 0

## 2022-12-02 NOTE — PROGRESS NOTES
Stephane Kirby is a 52 y.o. male     52-year-old male presents for follow-up  Generally feeling well no concerns    HTN - no headaches, dizziness, lightheadedness, CP  Checking at home   Tolerating meds without side effects    Reviewed labs hemoglobin A1c increased from 7.6 to 7.9  No change in vision  No numbness paresthesias  Diet activity relatively the same    Lipids at goal on Zocor       Past Medical History:   Diagnosis Date   • Glaucoma    • Lipid disorder    • Type 2 diabetes mellitus (CMS/HCC)        Past Surgical History:   Procedure Laterality Date   • EYE SURGERY     • GLAUCOMA SURGERY Bilateral         Social History     Socioeconomic History   • Marital status:      Spouse name: None   • Number of children: None   • Years of education: None   • Highest education level: None   Tobacco Use   • Smoking status: Former   • Smokeless tobacco: Never   Vaping Use   • Vaping Use: Never used   Substance and Sexual Activity   • Alcohol use: Not Currently     Comment: rarely   • Drug use: Not Currently   • Sexual activity: Defer   Social History Narrative    [employer]         Family History   Problem Relation Age of Onset   • Hypertension Biological Mother    • Diabetes Biological Mother    • Hyperlipidemia Biological Father    • Heart attack Biological Father        Patient has no known allergies.    Current Outpatient Medications   Medication Sig Dispense Refill   • FREESTYLE JAKUB 14 DAY SENSOR kit USE AS DIRECTED 2 kit 3   • JARDIANCE 10 mg tablet TAKE 1 TABLET BY MOUTH EVERY DAY 90 tablet 1   • Lactobacillus acidophilus (PROBIOTIC) 10 billion cell capsule Take 2 capsules by mouth daily.     • latanoprost (XALATAN) 0.005 % ophthalmic solution Administer 1 drop into both eyes nightly.     • metFORMIN (GLUCOPHAGE) 1,000 mg tablet TAKE 1 TABLET BY MOUTH TWICE A  tablet 1   • simvastatin (ZOCOR) 20 mg tablet TAKE 1 TABLET BY MOUTH EVERY DAY AT NIGHT 90 tablet 1   • timolol (TIMOPTIC) 0.5 %  ophthalmic solution Administer 1 drop into both eyes daily.       • cyanocobalamin (VITAMIN B12) 1,000 mcg tablet Take 1 tablet (1,000 mcg total) by mouth daily. 90 tablet 3     No current facility-administered medications for this visit.       Review of Systems   Constitutional: Negative.    HENT: Negative.    Eyes: Negative.    Respiratory: Negative.    Cardiovascular: Negative.    Gastrointestinal: Negative.    Endocrine: Negative.    Genitourinary: Negative.    Musculoskeletal: Negative.    Skin: Negative.    Allergic/Immunologic: Negative.    Neurological: Negative.    Hematological: Negative.    Psychiatric/Behavioral: Negative.           Vitals:    12/02/22 1315   BP: 130/72   Pulse: 81   Resp: 16   Temp: 36.7 °C (98.1 °F)   SpO2: 99%       Body mass index is 20.37 kg/m².      Physical Exam  Constitutional:       Appearance: Normal appearance.   HENT:      Right Ear: Tympanic membrane, ear canal and external ear normal.      Left Ear: Tympanic membrane, ear canal and external ear normal.   Eyes:      Extraocular Movements: Extraocular movements intact.      Conjunctiva/sclera: Conjunctivae normal.      Pupils: Pupils are equal, round, and reactive to light.      Comments: anicteric no pallor         Cardiovascular:      Rate and Rhythm: Normal rate and regular rhythm.      Pulses: Normal pulses.      Heart sounds: Normal heart sounds. No murmur heard.  Pulmonary:      Effort: Pulmonary effort is normal.      Breath sounds: Normal breath sounds. No wheezing or rhonchi.   Abdominal:      General: Abdomen is flat. Bowel sounds are normal.      Palpations: Abdomen is soft. There is no mass.      Tenderness: There is no abdominal tenderness.   Musculoskeletal:      Right lower leg: No edema.      Left lower leg: No edema.   Neurological:      General: No focal deficit present.      Mental Status: He is alert and oriented to person, place, and time.   Psychiatric:         Mood and Affect: Mood normal.          Behavior: Behavior normal.          Assessment/Plan      Diagnoses and all orders for this visit:    Type 2 diabetes mellitus without complication, without long-term current use of insulin (CMS/[facility]) (Primary)  -     CBC and differential; Future  -     Comprehensive metabolic panel; Future  -     Hemoglobin A1c; Future  Counseled on lifestyle changes for blood glucose reduction including decreasing starch intake and increasing activity  Increase jardiance to 25 mg   Follow-up labs 4 to 6 months if still elevated at that time will need to augment therapy  Dyslipidemia  -     Lipid panel; Future  Continue Zocor  Essential hypertension  At goal  Preventative health care  -     CBC and differential; Future  -     Lipid panel; Future  -     Comprehensive metabolic panel; Future  -     PSA; Future  -     Hemoglobin A1c; Future    Other orders  -     empagliflozin (JARDIANCE) 25 mg tablet; Take 1 tablet (25 mg total) by mouth daily.          No orders of the defined types were placed in this encounter.      There are no discontinued medications.     No orders of the defined types were placed in this encounter.       Olivier Melchor, DO  12/2/2022

## 2022-12-05 PROBLEM — E78.5 DYSLIPIDEMIA: Status: ACTIVE | Noted: 2022-12-05

## 2022-12-05 ASSESSMENT — ENCOUNTER SYMPTOMS
EYES NEGATIVE: 1
HEMATOLOGIC/LYMPHATIC NEGATIVE: 1
GASTROINTESTINAL NEGATIVE: 1
CARDIOVASCULAR NEGATIVE: 1
PSYCHIATRIC NEGATIVE: 1
RESPIRATORY NEGATIVE: 1
ENDOCRINE NEGATIVE: 1
ALLERGIC/IMMUNOLOGIC NEGATIVE: 1
NEUROLOGICAL NEGATIVE: 1
MUSCULOSKELETAL NEGATIVE: 1
CONSTITUTIONAL NEGATIVE: 1

## 2023-03-10 ENCOUNTER — DOCTOR'S OFFICE (OUTPATIENT)
Dept: URBAN - METROPOLITAN AREA CLINIC 127 | Facility: CLINIC | Age: 53
Setting detail: OPHTHALMOLOGY
End: 2023-03-10
Payer: COMMERCIAL

## 2023-03-10 DIAGNOSIS — E11.3293: ICD-10-CM

## 2023-03-10 DIAGNOSIS — H35.373: ICD-10-CM

## 2023-03-10 DIAGNOSIS — H40.1132: ICD-10-CM

## 2023-03-10 DIAGNOSIS — H04.123: ICD-10-CM

## 2023-03-10 DIAGNOSIS — H50.15: ICD-10-CM

## 2023-03-10 DIAGNOSIS — H01.001: ICD-10-CM

## 2023-03-10 PROCEDURE — 92133 CPTRZD OPH DX IMG PST SGM ON: CPT | Performed by: OPHTHALMOLOGY

## 2023-03-10 PROCEDURE — 92014 COMPRE OPH EXAM EST PT 1/>: CPT | Performed by: OPHTHALMOLOGY

## 2023-03-10 ASSESSMENT — PACHYMETRY
OS_CT_CORRECTION: 4
OD_CT_UM: 490
OD_CT_CORRECTION: 4
OS_CT_UM: 491

## 2023-03-10 ASSESSMENT — REFRACTION_CURRENTRX
OS_AXIS: 93
OD_OVR_VA: 20/
OS_OVR_VA: 20/
OD_CYLINDER: +0.75
OS_CYLINDER: +0.75
OD_SPHERE: +0.75
OS_SPHERE: +1.25
OD_AXIS: 175
OD_ADD: +1.50
OS_ADD: +1.50

## 2023-03-10 ASSESSMENT — LID EXAM ASSESSMENTS
OD_BLEPHARITIS: 2+
OS_BLEPHARITIS: 2+

## 2023-03-10 ASSESSMENT — REFRACTION_MANIFEST
OS_SPHERE: +1.25
OD_VA1: 20/25
OS_CYLINDER: +0.50
OD_SPHERE: +1.00
OS_SPHERE: +2.50
OS_ADD: +2.50
OD_ADD: +2.50
OS_AXIS: 95
OD_SPHERE: +2.50
OS_VA1: 20/30

## 2023-03-10 ASSESSMENT — SUPERFICIAL PUNCTATE KERATITIS (SPK)
OS_SPK: 1+
OD_SPK: 1+

## 2023-03-10 ASSESSMENT — CONFRONTATIONAL VISUAL FIELD TEST (CVF)
OS_FINDINGS: FULL
OD_FINDINGS: FULL

## 2023-03-10 ASSESSMENT — SPHEQUIV_DERIVED: OS_SPHEQUIV: 1.5

## 2023-03-10 ASSESSMENT — VISUAL ACUITY
OD_BCVA: 20/30
OS_BCVA: 20/30

## 2023-03-10 ASSESSMENT — TEAR BREAK UP TIME (TBUT)
OS_TBUT: 1+
OD_TBUT: 1+

## 2023-03-25 ENCOUNTER — APPOINTMENT (OUTPATIENT)
Dept: LAB | Facility: HOSPITAL | Age: 53
End: 2023-03-25
Attending: INTERNAL MEDICINE
Payer: COMMERCIAL

## 2023-03-25 DIAGNOSIS — Z00.00 PREVENTATIVE HEALTH CARE: ICD-10-CM

## 2023-03-25 DIAGNOSIS — E11.9 TYPE 2 DIABETES MELLITUS WITHOUT COMPLICATION, WITHOUT LONG-TERM CURRENT USE OF INSULIN (CMS/HCC): ICD-10-CM

## 2023-03-25 DIAGNOSIS — E78.5 DYSLIPIDEMIA: ICD-10-CM

## 2023-03-25 LAB
ALBUMIN SERPL-MCNC: 3.9 G/DL (ref 3.4–5)
ALP SERPL-CCNC: 54 IU/L (ref 35–126)
ALT SERPL-CCNC: 19 IU/L (ref 16–63)
ANION GAP SERPL CALC-SCNC: 10 MEQ/L (ref 3–15)
AST SERPL-CCNC: 19 IU/L (ref 15–41)
BASOPHILS # BLD: 0.03 K/UL (ref 0.01–0.1)
BASOPHILS NFR BLD: 0.6 %
BILIRUB SERPL-MCNC: 0.4 MG/DL (ref 0.3–1.2)
BUN SERPL-MCNC: 12 MG/DL (ref 8–20)
CALCIUM SERPL-MCNC: 9.5 MG/DL (ref 8.9–10.3)
CHLORIDE SERPL-SCNC: 104 MEQ/L (ref 98–109)
CHOLEST SERPL-MCNC: 133 MG/DL
CO2 SERPL-SCNC: 25 MEQ/L (ref 22–32)
CREAT SERPL-MCNC: 1.1 MG/DL (ref 0.8–1.3)
DIFFERENTIAL METHOD BLD: ABNORMAL
EOSINOPHIL # BLD: 0.2 K/UL (ref 0.04–0.54)
EOSINOPHIL NFR BLD: 3.8 %
ERYTHROCYTE [DISTWIDTH] IN BLOOD BY AUTOMATED COUNT: 13.1 % (ref 11.6–14.4)
EST. AVERAGE GLUCOSE BLD GHB EST-MCNC: 192 MG/DL
GFR SERPL CREATININE-BSD FRML MDRD: >60 ML/MIN/1.73M*2
GLUCOSE SERPL-MCNC: 132 MG/DL (ref 70–99)
HBA1C MFR BLD HPLC: 8.3 %
HCT VFR BLDCO AUTO: 42.9 % (ref 40.1–51)
HDLC SERPL-MCNC: 46 MG/DL
HDLC SERPL: 2.9 {RATIO}
HGB BLD-MCNC: 12.9 G/DL (ref 13.7–17.5)
IMM GRANULOCYTES # BLD AUTO: 0.01 K/UL (ref 0–0.08)
IMM GRANULOCYTES NFR BLD AUTO: 0.2 %
LDLC SERPL CALC-MCNC: 81 MG/DL
LYMPHOCYTES # BLD: 1.53 K/UL (ref 1.2–3.5)
LYMPHOCYTES NFR BLD: 29 %
MCH RBC QN AUTO: 27.1 PG (ref 28–33.2)
MCHC RBC AUTO-ENTMCNC: 30.1 G/DL (ref 32.2–36.5)
MCV RBC AUTO: 90.1 FL (ref 83–98)
MONOCYTES # BLD: 0.44 K/UL (ref 0.3–1)
MONOCYTES NFR BLD: 8.3 %
NEUTROPHILS # BLD: 3.07 K/UL (ref 1.7–7)
NEUTS SEG NFR BLD: 58.1 %
NONHDLC SERPL-MCNC: 87 MG/DL
NRBC BLD-RTO: 0 %
PDW BLD AUTO: 10.5 FL (ref 9.4–12.4)
PLATELET # BLD AUTO: 198 K/UL (ref 150–350)
POTASSIUM SERPL-SCNC: 4.3 MEQ/L (ref 3.6–5.1)
PROT SERPL-MCNC: 6.4 G/DL (ref 6–8.2)
PSA SERPL-MCNC: 1.12 NG/ML
RBC # BLD AUTO: 4.76 M/UL (ref 4.5–5.8)
SODIUM SERPL-SCNC: 139 MEQ/L (ref 136–144)
TRIGL SERPL-MCNC: 31 MG/DL (ref 30–149)
WBC # BLD AUTO: 5.28 K/UL (ref 3.8–10.5)

## 2023-03-25 PROCEDURE — 80053 COMPREHEN METABOLIC PANEL: CPT

## 2023-03-25 PROCEDURE — 84153 ASSAY OF PSA TOTAL: CPT

## 2023-03-25 PROCEDURE — 36415 COLL VENOUS BLD VENIPUNCTURE: CPT

## 2023-03-25 PROCEDURE — 83036 HEMOGLOBIN GLYCOSYLATED A1C: CPT

## 2023-03-25 PROCEDURE — 85025 COMPLETE CBC W/AUTO DIFF WBC: CPT

## 2023-03-25 PROCEDURE — 80061 LIPID PANEL: CPT

## 2023-04-03 ENCOUNTER — OFFICE VISIT (OUTPATIENT)
Dept: FAMILY MEDICINE | Facility: CLINIC | Age: 53
End: 2023-04-03
Payer: COMMERCIAL

## 2023-04-03 VITALS
SYSTOLIC BLOOD PRESSURE: 122 MMHG | WEIGHT: 143 LBS | OXYGEN SATURATION: 99 % | RESPIRATION RATE: 16 BRPM | HEIGHT: 70 IN | DIASTOLIC BLOOD PRESSURE: 68 MMHG | TEMPERATURE: 97.7 F | HEART RATE: 83 BPM | BODY MASS INDEX: 20.47 KG/M2

## 2023-04-03 DIAGNOSIS — I10 ESSENTIAL HYPERTENSION: ICD-10-CM

## 2023-04-03 DIAGNOSIS — E78.5 DYSLIPIDEMIA: ICD-10-CM

## 2023-04-03 DIAGNOSIS — E11.9 TYPE 2 DIABETES MELLITUS WITHOUT COMPLICATION, WITHOUT LONG-TERM CURRENT USE OF INSULIN (CMS/HCC): Primary | ICD-10-CM

## 2023-04-03 PROCEDURE — 3008F BODY MASS INDEX DOCD: CPT | Performed by: INTERNAL MEDICINE

## 2023-04-03 PROCEDURE — 3074F SYST BP LT 130 MM HG: CPT | Performed by: INTERNAL MEDICINE

## 2023-04-03 PROCEDURE — 3078F DIAST BP <80 MM HG: CPT | Performed by: INTERNAL MEDICINE

## 2023-04-03 PROCEDURE — 99214 OFFICE O/P EST MOD 30 MIN: CPT | Performed by: INTERNAL MEDICINE

## 2023-04-03 ASSESSMENT — ENCOUNTER SYMPTOMS
PSYCHIATRIC NEGATIVE: 1
EYES NEGATIVE: 1
GASTROINTESTINAL NEGATIVE: 1
RESPIRATORY NEGATIVE: 1
ALLERGIC/IMMUNOLOGIC NEGATIVE: 1
MUSCULOSKELETAL NEGATIVE: 1
CARDIOVASCULAR NEGATIVE: 1
NEUROLOGICAL NEGATIVE: 1
ENDOCRINE NEGATIVE: 1
HEMATOLOGIC/LYMPHATIC NEGATIVE: 1
CONSTITUTIONAL NEGATIVE: 1

## 2023-04-03 NOTE — PROGRESS NOTES
"Stephane Kirby is a 53 y.o. male     53-year-old male presents for follow-up multiple medical conditions  Reviewed labs  A1c increased to 8.2  \"Too many sweets\"  Stay active, \"cheating\"  Tolerating meds   No change in vision   no numbness or paresthesias    HTN - no headaches, dizziness, lightheadedness, CP  Checking at home   Tolerating meds without side effects          Past Medical History:   Diagnosis Date   • Glaucoma    • Lipid disorder    • Type 2 diabetes mellitus (CMS/HCC)        Past Surgical History:   Procedure Laterality Date   • EYE SURGERY     • GLAUCOMA SURGERY Bilateral         Social History     Socioeconomic History   • Marital status:      Spouse name: None   • Number of children: None   • Years of education: None   • Highest education level: None   Tobacco Use   • Smoking status: Former   • Smokeless tobacco: Never   Vaping Use   • Vaping status: Never Used   Substance and Sexual Activity   • Alcohol use: Not Currently     Comment: rarely   • Drug use: Not Currently   • Sexual activity: Defer   Social History Narrative    [facility] [facility]      Social Determinants of Health     Food Insecurity: No Food Insecurity (8/6/2021)    Hunger Vital Sign    • Worried About Running Out of Food in the Last Year: Never true    • Ran Out of Food in the Last Year: Never true       Family History   Problem Relation Age of Onset   • Hypertension Biological Mother    • Diabetes Biological Mother    • Hyperlipidemia Biological Father    • Heart attack Biological Father        Patient has no known allergies.    Current Outpatient Medications   Medication Sig Dispense Refill   • cyanocobalamin (VITAMIN B12) 1,000 mcg tablet Take 1 tablet (1,000 mcg total) by mouth daily. 90 tablet 3   • empagliflozin (JARDIANCE) 25 mg tablet Take 1 tablet (25 mg total) by mouth daily. 90 tablet 1   • FREESTYLE JAKUB 14 DAY SENSOR kit USE AS DIRECTED 2 kit 3   • Lactobacillus acidophilus (PROBIOTIC) 10 billion cell capsule Take 2 " capsules by mouth daily.     • latanoprost (XALATAN) 0.005 % ophthalmic solution Administer 1 drop into both eyes nightly.     • metFORMIN (GLUCOPHAGE) 1,000 mg tablet TAKE 1 TABLET BY MOUTH TWICE A  tablet 1   • simvastatin (ZOCOR) 20 mg tablet TAKE 1 TABLET BY MOUTH EVERY DAY AT NIGHT 90 tablet 1   • timolol (TIMOPTIC) 0.5 % ophthalmic solution Administer 1 drop into both eyes daily.         No current facility-administered medications for this visit.       Review of Systems   Constitutional: Negative.    HENT: Negative.    Eyes: Negative.    Respiratory: Negative.    Cardiovascular: Negative.    Gastrointestinal: Negative.    Endocrine: Negative.    Genitourinary: Negative.    Musculoskeletal: Negative.    Skin: Negative.    Allergic/Immunologic: Negative.    Neurological: Negative.    Hematological: Negative.    Psychiatric/Behavioral: Negative.           Vitals:    04/03/23 1307   BP: 122/72   Pulse: 83   Resp: 16   Temp: 36.5 °C (97.7 °F)   SpO2: 99%       Body mass index is 20.52 kg/m².      Physical Exam  Constitutional:       Appearance: Normal appearance.   HENT:      Right Ear: Tympanic membrane, ear canal and external ear normal.      Left Ear: Tympanic membrane, ear canal and external ear normal.   Eyes:      Extraocular Movements: Extraocular movements intact.      Conjunctiva/sclera: Conjunctivae normal.      Pupils: Pupils are equal, round, and reactive to light.      Comments: anicteric no pallor         Cardiovascular:      Rate and Rhythm: Normal rate and regular rhythm.      Pulses: Normal pulses.      Heart sounds: Normal heart sounds. No murmur heard.  Pulmonary:      Effort: Pulmonary effort is normal.      Breath sounds: Normal breath sounds. No wheezing or rhonchi.   Abdominal:      General: Abdomen is flat. Bowel sounds are normal.      Palpations: Abdomen is soft. There is no mass.      Tenderness: There is no abdominal tenderness.   Musculoskeletal:      Right lower leg: No edema.       Left lower leg: No edema.   Neurological:      General: No focal deficit present.      Mental Status: He is alert and oriented to person, place, and time.   Psychiatric:         Mood and Affect: Mood normal.         Behavior: Behavior normal.          Assessment/Plan      Diagnoses and all orders for this visit:    Type 2 diabetes mellitus without complication, without long-term current use of insulin (CMS/[facility]) (Primary)  -     Hemoglobin A1c; Future  -     Comprehensive metabolic panel; Future  -     CBC and differential; Future  -     Lipid panel; Future  -     Microalbumin / creatinine urine ratio; Future  Elevated 3 to 4-month trial of lifestyle changes to see if dietary adjustments can lead to A1c back within goal  Essential hypertension  At goal continue current meds  Dyslipidemia  LDL at goal continue statin        No orders of the defined types were placed in this encounter.      There are no discontinued medications.     No orders of the defined types were placed in this encounter.       Olivier Melchor, DO  4/3/2023

## 2023-06-05 DIAGNOSIS — E11.9 TYPE 2 DIABETES MELLITUS WITHOUT COMPLICATION, WITHOUT LONG-TERM CURRENT USE OF INSULIN (CMS/HCC): Primary | ICD-10-CM

## 2023-06-05 NOTE — TELEPHONE ENCOUNTER
Medicine Refill Request    Last Office: 4/3/2023   Last Consult Visit: Visit date not found  Last Telemedicine Visit: Visit date not found    Next Appointment: 7/10/2023      Current Outpatient Medications:   •  cyanocobalamin (VITAMIN B12) 1,000 mcg tablet, Take 1 tablet (1,000 mcg total) by mouth daily., Disp: 90 tablet, Rfl: 3  •  empagliflozin (JARDIANCE) 25 mg tablet, Take 1 tablet (25 mg total) by mouth daily., Disp: 90 tablet, Rfl: 1  •  FREESTYLE JAKUB 14 DAY SENSOR kit, USE AS DIRECTED, Disp: 2 kit, Rfl: 3  •  Lactobacillus acidophilus (PROBIOTIC) 10 billion cell capsule, Take 2 capsules by mouth daily., Disp: , Rfl:   •  latanoprost (XALATAN) 0.005 % ophthalmic solution, Administer 1 drop into both eyes nightly., Disp: , Rfl:   •  metFORMIN (GLUCOPHAGE) 1,000 mg tablet, TAKE 1 TABLET BY MOUTH TWICE A DAY, Disp: 180 tablet, Rfl: 1  •  simvastatin (ZOCOR) 20 mg tablet, TAKE 1 TABLET BY MOUTH EVERY DAY AT NIGHT, Disp: 90 tablet, Rfl: 1  •  timolol (TIMOPTIC) 0.5 % ophthalmic solution, Administer 1 drop into both eyes daily.  , Disp: , Rfl:       BP Readings from Last 3 Encounters:   04/03/23 122/68   12/02/22 130/72   09/02/22 122/72       Recent Lab results:  Lab Results   Component Value Date    CHOL 133 03/25/2023   ,   Lab Results   Component Value Date    HDL 46 03/25/2023   ,   Lab Results   Component Value Date    LDLCALC 81 03/25/2023   ,   Lab Results   Component Value Date    TRIG 31 03/25/2023        Lab Results   Component Value Date    GLUCOSE 132 (H) 03/25/2023   ,   Lab Results   Component Value Date    HGBA1C 8.3 (H) 03/25/2023         Lab Results   Component Value Date    CREATININE 1.1 03/25/2023       Lab Results   Component Value Date    TSH 1.57 08/06/2021

## 2023-06-13 DIAGNOSIS — E11.9 TYPE 2 DIABETES MELLITUS WITHOUT COMPLICATION, WITHOUT LONG-TERM CURRENT USE OF INSULIN (CMS/HCC): Primary | ICD-10-CM

## 2023-06-13 RX ORDER — FLASH GLUCOSE SENSOR
KIT MISCELLANEOUS
Qty: 2 KIT | Refills: 3 | Status: SHIPPED | OUTPATIENT
Start: 2023-06-13 | End: 2023-10-03

## 2023-07-01 ENCOUNTER — TRANSCRIBE ORDERS (OUTPATIENT)
Dept: LAB | Facility: HOSPITAL | Age: 53
End: 2023-07-01

## 2023-07-01 ENCOUNTER — APPOINTMENT (OUTPATIENT)
Dept: LAB | Facility: HOSPITAL | Age: 53
End: 2023-07-01
Attending: INTERNAL MEDICINE
Payer: COMMERCIAL

## 2023-07-01 DIAGNOSIS — E11.9 TYPE 2 DIABETES MELLITUS WITHOUT COMPLICATIONS (CMS/HCC): Primary | ICD-10-CM

## 2023-07-01 DIAGNOSIS — E11.9 TYPE 2 DIABETES MELLITUS WITHOUT COMPLICATIONS (CMS/HCC): ICD-10-CM

## 2023-07-01 LAB
ALBUMIN SERPL-MCNC: 4.2 G/DL (ref 3.5–5.7)
ALP SERPL-CCNC: 47 IU/L (ref 34–104)
ALT SERPL-CCNC: 12 IU/L (ref 7–52)
ANION GAP SERPL CALC-SCNC: 7 MEQ/L (ref 3–15)
AST SERPL-CCNC: 13 IU/L (ref 13–39)
BASOPHILS # BLD: 0.03 K/UL (ref 0.01–0.1)
BASOPHILS NFR BLD: 0.6 %
BILIRUB SERPL-MCNC: 0.3 MG/DL (ref 0.3–1)
BUN SERPL-MCNC: 16 MG/DL (ref 7–25)
CALCIUM SERPL-MCNC: 9.4 MG/DL (ref 8.6–10.3)
CHLORIDE SERPL-SCNC: 105 MEQ/L (ref 98–107)
CO2 SERPL-SCNC: 26 MEQ/L (ref 21–31)
CREAT SERPL-MCNC: 1 MG/DL (ref 0.7–1.3)
CREAT UR-MCNC: 77.7 MG/DL
DIFFERENTIAL METHOD BLD: ABNORMAL
EOSINOPHIL # BLD: 0.29 K/UL (ref 0.04–0.54)
EOSINOPHIL NFR BLD: 6.2 %
ERYTHROCYTE [DISTWIDTH] IN BLOOD BY AUTOMATED COUNT: 14 % (ref 11.6–14.4)
EST. AVERAGE GLUCOSE BLD GHB EST-MCNC: 177 MG/DL
GFR SERPL CREATININE-BSD FRML MDRD: >60 ML/MIN/1.73M*2
GLUCOSE SERPL-MCNC: 114 MG/DL (ref 70–99)
HBA1C MFR BLD: 7.8 %
HCT VFR BLDCO AUTO: 41.1 % (ref 40.1–51)
HGB BLD-MCNC: 12.7 G/DL (ref 13.7–17.5)
IMM GRANULOCYTES # BLD AUTO: 0.01 K/UL (ref 0–0.08)
IMM GRANULOCYTES NFR BLD AUTO: 0.2 %
LYMPHOCYTES # BLD: 1.57 K/UL (ref 1.2–3.5)
LYMPHOCYTES NFR BLD: 33.7 %
MCH RBC QN AUTO: 28.2 PG (ref 28–33.2)
MCHC RBC AUTO-ENTMCNC: 30.9 G/DL (ref 32.2–36.5)
MCV RBC AUTO: 91.1 FL (ref 83–98)
MICROALBUMIN UR-MCNC: 4.6 MG/L
MICROALBUMIN/CREAT UR: 5.9 UG/MG
MONOCYTES # BLD: 0.37 K/UL (ref 0.3–1)
MONOCYTES NFR BLD: 7.9 %
NEUTROPHILS # BLD: 2.39 K/UL (ref 1.7–7)
NEUTS SEG NFR BLD: 51.4 %
NRBC BLD-RTO: 0 %
PDW BLD AUTO: 10.6 FL (ref 9.4–12.4)
PLATELET # BLD AUTO: 167 K/UL (ref 150–350)
POTASSIUM SERPL-SCNC: 4.2 MEQ/L (ref 3.5–5.1)
PROT SERPL-MCNC: 6.3 G/DL (ref 6.4–8.9)
RBC # BLD AUTO: 4.51 M/UL (ref 4.5–5.8)
SODIUM SERPL-SCNC: 138 MEQ/L (ref 136–145)
WBC # BLD AUTO: 4.66 K/UL (ref 3.8–10.5)

## 2023-07-01 PROCEDURE — 36415 COLL VENOUS BLD VENIPUNCTURE: CPT

## 2023-07-01 PROCEDURE — 80053 COMPREHEN METABOLIC PANEL: CPT

## 2023-07-01 PROCEDURE — 82043 UR ALBUMIN QUANTITATIVE: CPT

## 2023-07-01 PROCEDURE — 83036 HEMOGLOBIN GLYCOSYLATED A1C: CPT

## 2023-07-01 PROCEDURE — 80061 LIPID PANEL: CPT

## 2023-07-01 PROCEDURE — 85025 COMPLETE CBC W/AUTO DIFF WBC: CPT

## 2023-07-10 ENCOUNTER — OFFICE VISIT (OUTPATIENT)
Dept: FAMILY MEDICINE | Facility: CLINIC | Age: 53
End: 2023-07-10
Payer: COMMERCIAL

## 2023-07-10 VITALS
HEART RATE: 82 BPM | WEIGHT: 140 LBS | SYSTOLIC BLOOD PRESSURE: 115 MMHG | TEMPERATURE: 97.5 F | DIASTOLIC BLOOD PRESSURE: 72 MMHG | OXYGEN SATURATION: 98 % | BODY MASS INDEX: 20.04 KG/M2 | HEIGHT: 70 IN | RESPIRATION RATE: 18 BRPM

## 2023-07-10 DIAGNOSIS — E78.5 DYSLIPIDEMIA: ICD-10-CM

## 2023-07-10 DIAGNOSIS — Z00.00 ENCOUNTER FOR GENERAL ADULT MEDICAL EXAMINATION WITHOUT ABNORMAL FINDINGS: Primary | ICD-10-CM

## 2023-07-10 DIAGNOSIS — I10 ESSENTIAL HYPERTENSION: ICD-10-CM

## 2023-07-10 DIAGNOSIS — E11.9 TYPE 2 DIABETES MELLITUS WITHOUT COMPLICATION, WITHOUT LONG-TERM CURRENT USE OF INSULIN (CMS/HCC): ICD-10-CM

## 2023-07-10 PROCEDURE — 3074F SYST BP LT 130 MM HG: CPT | Performed by: INTERNAL MEDICINE

## 2023-07-10 PROCEDURE — 99396 PREV VISIT EST AGE 40-64: CPT | Performed by: INTERNAL MEDICINE

## 2023-07-10 PROCEDURE — 3078F DIAST BP <80 MM HG: CPT | Performed by: INTERNAL MEDICINE

## 2023-07-10 PROCEDURE — 3008F BODY MASS INDEX DOCD: CPT | Performed by: INTERNAL MEDICINE

## 2023-07-10 RX ORDER — METFORMIN HYDROCHLORIDE 1000 MG/1
1000 TABLET ORAL 2 TIMES DAILY
Qty: 180 TABLET | Refills: 1 | Status: SHIPPED | OUTPATIENT
Start: 2023-07-10 | End: 2023-10-09

## 2023-07-10 NOTE — TELEPHONE ENCOUNTER
Medicine Refill Request    Last Office: 4/3/2023   Last Consult Visit: Visit date not found  Last Telemedicine Visit: Visit date not found    Next Appointment: 7/10/2023      Current Outpatient Medications:   •  cyanocobalamin (VITAMIN B12) 1,000 mcg tablet, Take 1 tablet (1,000 mcg total) by mouth daily., Disp: 90 tablet, Rfl: 3  •  empagliflozin (JARDIANCE) 25 mg tablet, Take 1 tablet (25 mg total) by mouth daily., Disp: 90 tablet, Rfl: 1  •  FREESTYLE JAKUB 14 DAY SENSOR kit, USE AS DIRECTED, Disp: 2 kit, Rfl: 3  •  Lactobacillus acidophilus (PROBIOTIC) 10 billion cell capsule, Take 2 capsules by mouth daily., Disp: , Rfl:   •  latanoprost (XALATAN) 0.005 % ophthalmic solution, Administer 1 drop into both eyes nightly., Disp: , Rfl:   •  metFORMIN (GLUCOPHAGE) 1,000 mg tablet, TAKE 1 TABLET BY MOUTH TWICE A DAY, Disp: 180 tablet, Rfl: 1  •  simvastatin (ZOCOR) 20 mg tablet, TAKE 1 TABLET BY MOUTH EVERY DAY AT NIGHT, Disp: 90 tablet, Rfl: 1  •  timolol (TIMOPTIC) 0.5 % ophthalmic solution, Administer 1 drop into both eyes daily.  , Disp: , Rfl:       BP Readings from Last 3 Encounters:   04/03/23 122/68   12/02/22 130/72   09/02/22 122/72       Recent Lab results:  Lab Results   Component Value Date    CHOL 134 07/01/2023   ,   Lab Results   Component Value Date    HDL 46 (H) 07/01/2023   ,   Lab Results   Component Value Date    LDLCALC 78 07/01/2023   ,   Lab Results   Component Value Date    TRIG 48 07/01/2023        Lab Results   Component Value Date    GLUCOSE 114 (H) 07/01/2023   ,   Lab Results   Component Value Date    HGBA1C 7.8 (H) 07/01/2023         Lab Results   Component Value Date    CREATININE 1.0 07/01/2023       Lab Results   Component Value Date    TSH 1.57 08/06/2021

## 2023-07-10 NOTE — PROGRESS NOTES
Stephane Kirby is a 53 y.o. male     54 y/o male presents for CPE   Reviewed labs A1c down slightly from 8.3-7.8  Continues to remain active  Health-conscious diet  Adherent with medication regimen  No acute concerns or complaints today    Vision no changes in vision prescription stable follows with ophthalmology twice a year  Dental every 6 months  NO CP, SOB  Activity very active especially at work  Memory no cognitive concerns  Mood mood is good   Bowel, bladder no issues  Sleeping well    Wt Readings from Last 3 Encounters:  07/10/23 : 63.5 kg (140 lb)  04/03/23 : 64.9 kg (143 lb)  12/02/22 : 64.4 kg (142 lb)         Past Medical History:   Diagnosis Date   • Glaucoma    • Lipid disorder    • Type 2 diabetes mellitus (CMS/HCC)        Past Surgical History:   Procedure Laterality Date   • EYE SURGERY     • GLAUCOMA SURGERY Bilateral         Social History     Socioeconomic History   • Marital status:      Spouse name: None   • Number of children: None   • Years of education: None   • Highest education level: None   Tobacco Use   • Smoking status: Former   • Smokeless tobacco: Never   Vaping Use   • Vaping Use: Never used   Substance and Sexual Activity   • Alcohol use: Not Currently     Comment: rarely   • Drug use: Not Currently   • Sexual activity: Defer   Social History Narrative    [facility]      Social Determinants of Health     Food Insecurity: No Food Insecurity (8/6/2021)    Hunger Vital Sign    • Worried About Running Out of Food in the Last Year: Never true    • Ran Out of Food in the Last Year: Never true       Family History   Problem Relation Age of Onset   • Hypertension Biological Mother    • Diabetes Biological Mother    • Hyperlipidemia Biological Father    • Heart attack Biological Father        Patient has no known allergies.    Current Outpatient Medications   Medication Sig Dispense Refill   • empagliflozin (JARDIANCE) 25 mg tablet Take 1 tablet (25 mg total) by mouth daily. 90  tablet 1   • FREESTYLE JAKUB 14 DAY SENSOR kit USE AS DIRECTED 2 kit 3   • Lactobacillus acidophilus (PROBIOTIC) 10 billion cell capsule Take 2 capsules by mouth daily.     • latanoprost (XALATAN) 0.005 % ophthalmic solution Administer 1 drop into both eyes nightly.     • metFORMIN (GLUCOPHAGE) 1,000 mg tablet Take 1 tablet (1,000 mg total) by mouth 2 (two) times a day. 180 tablet 1   • simvastatin (ZOCOR) 20 mg tablet TAKE 1 TABLET BY MOUTH EVERY DAY AT NIGHT 90 tablet 1   • timolol (TIMOPTIC) 0.5 % ophthalmic solution Administer 1 drop into both eyes daily.       • cyanocobalamin (VITAMIN B12) 1,000 mcg tablet Take 1 tablet (1,000 mcg total) by mouth daily. 90 tablet 3     No current facility-administered medications for this visit.       Review of Systems   Constitutional: Negative.    HENT: Negative.    Eyes: Negative.    Respiratory: Negative.    Cardiovascular: Negative.    Gastrointestinal: Negative.    Endocrine: Negative.    Genitourinary: Negative.    Musculoskeletal: Negative.    Skin: Negative.    Allergic/Immunologic: Negative.    Neurological: Negative.    Hematological: Negative.    Psychiatric/Behavioral: Negative.           Vitals:    07/10/23 1248   BP: 115/72   Pulse: 82   Resp: 18   Temp: 36.4 °C (97.5 °F)   SpO2: 98%       Body mass index is 20.09 kg/m².      Physical Exam  Constitutional:       Appearance: Normal appearance.   HENT:      Right Ear: Tympanic membrane, ear canal and external ear normal.      Left Ear: Tympanic membrane, ear canal and external ear normal.   Eyes:      Extraocular Movements: Extraocular movements intact.      Conjunctiva/sclera: Conjunctivae normal.      Pupils: Pupils are equal, round, and reactive to light.      Comments: anicteric no pallor         Cardiovascular:      Rate and Rhythm: Normal rate and regular rhythm.      Pulses: Normal pulses.      Heart sounds: Normal heart sounds. No murmur heard.  Pulmonary:      Effort: Pulmonary effort is normal.       Breath sounds: Normal breath sounds. No wheezing or rhonchi.   Abdominal:      General: Abdomen is flat. Bowel sounds are normal.      Palpations: Abdomen is soft. There is no mass.      Tenderness: There is no abdominal tenderness.   Musculoskeletal:      Right lower leg: No edema.      Left lower leg: No edema.   Neurological:      General: No focal deficit present.      Mental Status: He is alert and oriented to person, place, and time.   Psychiatric:         Mood and Affect: Mood normal.         Behavior: Behavior normal.          Assessment/Plan      Diagnoses and all orders for this visit:    Encounter for general adult medical examination without abnormal findings (Primary)  Immunizations UTD including COVID vaccine with booster - due for shingrix   Yeso UTD   Independent no acute needs   Type 2 diabetes mellitus without complication, without long-term current use of insulin (CMS/[facility])  -     Hemoglobin A1c; Future  -     Comprehensive metabolic panel; Future  -     CBC and differential; Future  -     Lipid panel; Future  Follow-up labs 3 to 4 months  Need to get ophthalmology record of retinal exam  Essential hypertension  At goal continue current meds  Dyslipidemia   LDL at goal continue statin          No orders of the defined types were placed in this encounter.      There are no discontinued medications.     No orders of the defined types were placed in this encounter.       Olivier Melchor, DO  7/10/2023

## 2023-07-16 ASSESSMENT — ENCOUNTER SYMPTOMS
GASTROINTESTINAL NEGATIVE: 1
CARDIOVASCULAR NEGATIVE: 1
EYES NEGATIVE: 1
ALLERGIC/IMMUNOLOGIC NEGATIVE: 1
CONSTITUTIONAL NEGATIVE: 1
RESPIRATORY NEGATIVE: 1
ENDOCRINE NEGATIVE: 1
NEUROLOGICAL NEGATIVE: 1
MUSCULOSKELETAL NEGATIVE: 1
PSYCHIATRIC NEGATIVE: 1
HEMATOLOGIC/LYMPHATIC NEGATIVE: 1

## 2023-08-01 LAB
CHOLEST SERPL-MCNC: 134 MG/DL
HDLC SERPL-MCNC: 46 MG/DL
HDLC SERPL: 2.9 {RATIO}
LDLC SERPL CALC-MCNC: 78 MG/DL
NONHDLC SERPL-MCNC: 88 MG/DL
TRIGL SERPL-MCNC: 48 MG/DL

## 2023-08-30 DIAGNOSIS — E78.5 DYSLIPIDEMIA: Primary | ICD-10-CM

## 2023-08-30 RX ORDER — SIMVASTATIN 20 MG/1
20 TABLET, FILM COATED ORAL NIGHTLY
Qty: 90 TABLET | Refills: 3 | Status: SHIPPED | OUTPATIENT
Start: 2023-08-30 | End: 2023-11-12 | Stop reason: SDUPTHER

## 2023-08-30 NOTE — TELEPHONE ENCOUNTER
Medicine Refill Request    Last Office Visit: 7/10/2023   Last Consult Visit: Visit date not found  Last Telemedicine Visit: Visit date not found    Next Appointment: 1/10/2024      Current Outpatient Medications:   •  cyanocobalamin (VITAMIN B12) 1,000 mcg tablet, Take 1 tablet (1,000 mcg total) by mouth daily., Disp: 90 tablet, Rfl: 3  •  empagliflozin (JARDIANCE) 25 mg tablet, Take 1 tablet (25 mg total) by mouth daily., Disp: 90 tablet, Rfl: 1  •  FREESTYLE JAKUB 14 DAY SENSOR kit, USE AS DIRECTED, Disp: 2 kit, Rfl: 3  •  Lactobacillus acidophilus (PROBIOTIC) 10 billion cell capsule, Take 2 capsules by mouth daily., Disp: , Rfl:   •  latanoprost (XALATAN) 0.005 % ophthalmic solution, Administer 1 drop into both eyes nightly., Disp: , Rfl:   •  metFORMIN (GLUCOPHAGE) 1,000 mg tablet, Take 1 tablet (1,000 mg total) by mouth 2 (two) times a day., Disp: 180 tablet, Rfl: 1  •  simvastatin (ZOCOR) 20 mg tablet, TAKE 1 TABLET BY MOUTH EVERY DAY AT NIGHT, Disp: 90 tablet, Rfl: 1  •  timolol (TIMOPTIC) 0.5 % ophthalmic solution, Administer 1 drop into both eyes daily.  , Disp: , Rfl:       BP Readings from Last 3 Encounters:   07/10/23 115/72   04/03/23 122/68   12/02/22 130/72       Recent Lab results:  Lab Results   Component Value Date    CHOL 134 07/01/2023   ,   Lab Results   Component Value Date    HDL 46 07/01/2023   ,   Lab Results   Component Value Date    LDLCALC 78 07/01/2023   ,   Lab Results   Component Value Date    TRIG 48 07/01/2023        Lab Results   Component Value Date    GLUCOSE 114 (H) 07/01/2023   ,   Lab Results   Component Value Date    HGBA1C 7.8 (H) 07/01/2023         Lab Results   Component Value Date    CREATININE 1.0 07/01/2023       Lab Results   Component Value Date    TSH 1.57 08/06/2021           Lab Results   Component Value Date    HGBA1C 7.8 (H) 07/01/2023

## 2023-10-03 DIAGNOSIS — E11.9 TYPE 2 DIABETES MELLITUS WITHOUT COMPLICATION, WITHOUT LONG-TERM CURRENT USE OF INSULIN (CMS/HCC): ICD-10-CM

## 2023-10-03 RX ORDER — FLASH GLUCOSE SENSOR
KIT MISCELLANEOUS
Qty: 2 KIT | Refills: 3 | Status: SHIPPED | OUTPATIENT
Start: 2023-10-03 | End: 2024-01-30

## 2023-10-03 NOTE — TELEPHONE ENCOUNTER
Medicine Refill Request    Last Office Visit: 7/10/2023   Last Consult Visit: Visit date not found  Last Telemedicine Visit: Visit date not found    Next Appointment: 1/10/2024      Current Outpatient Medications:     cyanocobalamin (VITAMIN B12) 1,000 mcg tablet, Take 1 tablet (1,000 mcg total) by mouth daily., Disp: 90 tablet, Rfl: 3    empagliflozin (JARDIANCE) 25 mg tablet, Take 1 tablet (25 mg total) by mouth daily., Disp: 90 tablet, Rfl: 1    FREESTYLE JAKUB 14 DAY SENSOR kit, USE AS DIRECTED, Disp: 2 kit, Rfl: 3    Lactobacillus acidophilus (PROBIOTIC) 10 billion cell capsule, Take 2 capsules by mouth daily., Disp: , Rfl:     latanoprost (XALATAN) 0.005 % ophthalmic solution, Administer 1 drop into both eyes nightly., Disp: , Rfl:     metFORMIN (GLUCOPHAGE) 1,000 mg tablet, Take 1 tablet (1,000 mg total) by mouth 2 (two) times a day., Disp: 180 tablet, Rfl: 1    simvastatin (ZOCOR) 20 mg tablet, Take 1 tablet (20 mg total) by mouth nightly., Disp: 90 tablet, Rfl: 3    timolol (TIMOPTIC) 0.5 % ophthalmic solution, Administer 1 drop into both eyes daily.  , Disp: , Rfl:       BP Readings from Last 3 Encounters:   07/10/23 115/72   04/03/23 122/68   12/02/22 130/72       Recent Lab results:  Lab Results   Component Value Date    CHOL 134 07/01/2023   ,   Lab Results   Component Value Date    HDL 46 07/01/2023   ,   Lab Results   Component Value Date    LDLCALC 78 07/01/2023   ,   Lab Results   Component Value Date    TRIG 48 07/01/2023        Lab Results   Component Value Date    GLUCOSE 114 (H) 07/01/2023   ,   Lab Results   Component Value Date    HGBA1C 7.8 (H) 07/01/2023         Lab Results   Component Value Date    CREATININE 1.0 07/01/2023       Lab Results   Component Value Date    TSH 1.57 08/06/2021           Lab Results   Component Value Date    HGBA1C 7.8 (H) 07/01/2023

## 2023-10-07 ENCOUNTER — DOCTOR'S OFFICE (OUTPATIENT)
Dept: URBAN - METROPOLITAN AREA CLINIC 127 | Facility: CLINIC | Age: 53
Setting detail: OPHTHALMOLOGY
End: 2023-10-07
Payer: COMMERCIAL

## 2023-10-07 DIAGNOSIS — H35.373: ICD-10-CM

## 2023-10-07 DIAGNOSIS — H04.123: ICD-10-CM

## 2023-10-07 DIAGNOSIS — E11.3293: ICD-10-CM

## 2023-10-07 DIAGNOSIS — H40.1132: ICD-10-CM

## 2023-10-07 PROCEDURE — 99214 OFFICE O/P EST MOD 30 MIN: CPT | Performed by: OPHTHALMOLOGY

## 2023-10-07 PROCEDURE — 92020 GONIOSCOPY: CPT | Performed by: OPHTHALMOLOGY

## 2023-10-07 PROCEDURE — 92083 EXTENDED VISUAL FIELD XM: CPT | Performed by: OPHTHALMOLOGY

## 2023-10-07 PROCEDURE — 76514 ECHO EXAM OF EYE THICKNESS: CPT | Performed by: OPHTHALMOLOGY

## 2023-10-07 ASSESSMENT — REFRACTION_MANIFEST
OD_VA1: 20/25
OS_SPHERE: +2.50
OD_SPHERE: +2.50
OS_VA1: 20/30
OD_ADD: +2.50
OS_CYLINDER: +0.50
OD_SPHERE: +1.00
OS_ADD: +2.50
OS_SPHERE: +1.25
OS_AXIS: 95

## 2023-10-07 ASSESSMENT — LID EXAM ASSESSMENTS
OD_BLEPHARITIS: 2+
OS_BLEPHARITIS: 2+

## 2023-10-07 ASSESSMENT — REFRACTION_CURRENTRX
OS_AXIS: 066
OD_CYLINDER: +0.75
OD_ADD: +1.25
OS_CYLINDER: +0.75
OS_CYLINDER: +0.75
OS_OVR_VA: 20/
OS_AXIS: 93
OS_SPHERE: +2.00
OD_SPHERE: +1.50
OD_OVR_VA: 20/
OS_SPHERE: +1.25
OD_SPHERE: +0.75
OD_OVR_VA: 20/
OD_AXIS: 144
OD_AXIS: 175
OS_OVR_VA: 20/
OD_CYLINDER: +0.75
OS_ADD: +1.25
OD_ADD: +1.50
OS_ADD: +1.50

## 2023-10-07 ASSESSMENT — PACHYMETRY
OD_CT_CORRECTION: 4
OD_CT_UM: 499
OS_CT_CORRECTION: 4
OS_CT_UM: 481

## 2023-10-07 ASSESSMENT — REFRACTION_AUTOREFRACTION
OS_AXIS: 103
OS_CYLINDER: +0.25
OS_SPHERE: +2.00
OD_SPHERE: +0.75
OD_AXIS: 170
OD_CYLINDER: +0.50

## 2023-10-07 ASSESSMENT — KERATOMETRY
OS_K1POWER_DIOPTERS: 42.25
OS_AXISANGLE_DEGREES: 118
OD_K1POWER_DIOPTERS: 41.75
OD_AXISANGLE_DEGREES: 046
OD_K2POWER_DIOPTERS: 42.50
OS_K2POWER_DIOPTERS: 43.00

## 2023-10-07 ASSESSMENT — CONFRONTATIONAL VISUAL FIELD TEST (CVF)
OS_FINDINGS: FULL
OD_FINDINGS: FULL

## 2023-10-07 ASSESSMENT — AXIALLENGTH_DERIVED
OS_AL: 23.3318
OS_AL: 23.096
OD_AL: 23.7079

## 2023-10-07 ASSESSMENT — SUPERFICIAL PUNCTATE KERATITIS (SPK)
OS_SPK: 1+
OD_SPK: 1+

## 2023-10-07 ASSESSMENT — TEAR BREAK UP TIME (TBUT)
OS_TBUT: 1+
OD_TBUT: 1+

## 2023-10-07 ASSESSMENT — VISUAL ACUITY
OS_BCVA: 20/30
OD_BCVA: 20/30

## 2023-10-07 ASSESSMENT — SPHEQUIV_DERIVED
OS_SPHEQUIV: 1.5
OD_SPHEQUIV: 1
OS_SPHEQUIV: 2.125

## 2023-10-07 ASSESSMENT — TONOMETRY
OS_IOP_MMHG: 14
OD_IOP_MMHG: 10

## 2023-10-09 RX ORDER — METFORMIN HYDROCHLORIDE 1000 MG/1
1000 TABLET ORAL 2 TIMES DAILY
Qty: 180 TABLET | Refills: 1 | Status: SHIPPED | OUTPATIENT
Start: 2023-10-09 | End: 2023-11-12 | Stop reason: SDUPTHER

## 2023-10-09 NOTE — TELEPHONE ENCOUNTER
Medicine Refill Request    Last Office Visit: 7/10/2023   Last Consult Visit: Visit date not found  Last Telemedicine Visit: Visit date not found    Next Appointment: 1/10/2024      Current Outpatient Medications:     FREESTYLE JAKUB 14 DAY SENSOR kit, USE AS DIRECTED, Disp: 2 kit, Rfl: 3    cyanocobalamin (VITAMIN B12) 1,000 mcg tablet, Take 1 tablet (1,000 mcg total) by mouth daily., Disp: 90 tablet, Rfl: 3    empagliflozin (JARDIANCE) 25 mg tablet, Take 1 tablet (25 mg total) by mouth daily., Disp: 90 tablet, Rfl: 1    Lactobacillus acidophilus (PROBIOTIC) 10 billion cell capsule, Take 2 capsules by mouth daily., Disp: , Rfl:     latanoprost (XALATAN) 0.005 % ophthalmic solution, Administer 1 drop into both eyes nightly., Disp: , Rfl:     metFORMIN (GLUCOPHAGE) 1,000 mg tablet, Take 1 tablet (1,000 mg total) by mouth 2 (two) times a day., Disp: 180 tablet, Rfl: 1    simvastatin (ZOCOR) 20 mg tablet, Take 1 tablet (20 mg total) by mouth nightly., Disp: 90 tablet, Rfl: 3    timolol (TIMOPTIC) 0.5 % ophthalmic solution, Administer 1 drop into both eyes daily.  , Disp: , Rfl:       BP Readings from Last 3 Encounters:   07/10/23 115/72   04/03/23 122/68   12/02/22 130/72       Recent Lab results:  Lab Results   Component Value Date    CHOL 134 07/01/2023   ,   Lab Results   Component Value Date    HDL 46 07/01/2023   ,   Lab Results   Component Value Date    LDLCALC 78 07/01/2023   ,   Lab Results   Component Value Date    TRIG 48 07/01/2023        Lab Results   Component Value Date    GLUCOSE 114 (H) 07/01/2023   ,   Lab Results   Component Value Date    HGBA1C 7.8 (H) 07/01/2023         Lab Results   Component Value Date    CREATININE 1.0 07/01/2023       Lab Results   Component Value Date    TSH 1.57 08/06/2021           Lab Results   Component Value Date    HGBA1C 7.8 (H) 07/01/2023

## 2023-11-12 DIAGNOSIS — E11.9 TYPE 2 DIABETES MELLITUS WITHOUT COMPLICATION, WITHOUT LONG-TERM CURRENT USE OF INSULIN (CMS/HCC): ICD-10-CM

## 2023-11-12 DIAGNOSIS — E78.5 DYSLIPIDEMIA: ICD-10-CM

## 2023-11-13 RX ORDER — METFORMIN HYDROCHLORIDE 1000 MG/1
1000 TABLET ORAL 2 TIMES DAILY
Qty: 180 TABLET | Refills: 1 | Status: SHIPPED | OUTPATIENT
Start: 2023-11-13 | End: 2024-08-14

## 2023-11-13 RX ORDER — SIMVASTATIN 20 MG/1
20 TABLET, FILM COATED ORAL NIGHTLY
Qty: 90 TABLET | Refills: 3 | Status: SHIPPED | OUTPATIENT
Start: 2023-11-13 | End: 2025-01-02

## 2023-11-13 NOTE — TELEPHONE ENCOUNTER
Medicine Refill Request    Last Office Visit: 7/10/2023   Last Consult Visit: Visit date not found  Last Telemedicine Visit: Visit date not found    Next Appointment: 1/10/2024      Current Outpatient Medications:     FREESTYLE JAKUB 14 DAY SENSOR kit, USE AS DIRECTED, Disp: 2 kit, Rfl: 3    metFORMIN (GLUCOPHAGE) 1,000 mg tablet, TAKE 1 TABLET BY MOUTH TWICE A DAY, Disp: 180 tablet, Rfl: 1    cyanocobalamin (VITAMIN B12) 1,000 mcg tablet, Take 1 tablet (1,000 mcg total) by mouth daily., Disp: 90 tablet, Rfl: 3    empagliflozin (JARDIANCE) 25 mg tablet, Take 1 tablet (25 mg total) by mouth daily., Disp: 90 tablet, Rfl: 1    Lactobacillus acidophilus (PROBIOTIC) 10 billion cell capsule, Take 2 capsules by mouth daily., Disp: , Rfl:     latanoprost (XALATAN) 0.005 % ophthalmic solution, Administer 1 drop into both eyes nightly., Disp: , Rfl:     simvastatin (ZOCOR) 20 mg tablet, Take 1 tablet (20 mg total) by mouth nightly., Disp: 90 tablet, Rfl: 3    timolol (TIMOPTIC) 0.5 % ophthalmic solution, Administer 1 drop into both eyes daily.  , Disp: , Rfl:       BP Readings from Last 3 Encounters:   07/10/23 115/72   04/03/23 122/68   12/02/22 130/72       Recent Lab results:  Lab Results   Component Value Date    CHOL 134 07/01/2023   ,   Lab Results   Component Value Date    HDL 46 07/01/2023   ,   Lab Results   Component Value Date    LDLCALC 78 07/01/2023   ,   Lab Results   Component Value Date    TRIG 48 07/01/2023        Lab Results   Component Value Date    GLUCOSE 114 (H) 07/01/2023   ,   Lab Results   Component Value Date    HGBA1C 7.8 (H) 07/01/2023         Lab Results   Component Value Date    CREATININE 1.0 07/01/2023       Lab Results   Component Value Date    TSH 1.57 08/06/2021           Lab Results   Component Value Date    HGBA1C 7.8 (H) 07/01/2023

## 2024-01-06 ENCOUNTER — TRANSCRIBE ORDERS (OUTPATIENT)
Dept: LAB | Facility: HOSPITAL | Age: 54
End: 2024-01-06

## 2024-01-06 ENCOUNTER — APPOINTMENT (OUTPATIENT)
Dept: LAB | Facility: HOSPITAL | Age: 54
End: 2024-01-06
Attending: INTERNAL MEDICINE
Payer: COMMERCIAL

## 2024-01-06 DIAGNOSIS — E11.9 TYPE 2 DIABETES MELLITUS WITHOUT COMPLICATIONS (CMS/HCC): ICD-10-CM

## 2024-01-06 DIAGNOSIS — E11.9 TYPE 2 DIABETES MELLITUS WITHOUT COMPLICATIONS (CMS/HCC): Primary | ICD-10-CM

## 2024-01-06 LAB
ALBUMIN SERPL-MCNC: 4.2 G/DL (ref 3.5–5.7)
ALP SERPL-CCNC: 54 IU/L (ref 34–125)
ALT SERPL-CCNC: 14 IU/L (ref 7–52)
ANION GAP SERPL CALC-SCNC: 10 MEQ/L (ref 3–15)
AST SERPL-CCNC: 14 IU/L (ref 13–39)
BASOPHILS # BLD: 0.03 K/UL (ref 0.01–0.1)
BASOPHILS NFR BLD: 0.6 %
BILIRUB SERPL-MCNC: 0.3 MG/DL (ref 0.3–1.2)
BUN SERPL-MCNC: 17 MG/DL (ref 7–25)
CALCIUM SERPL-MCNC: 9.2 MG/DL (ref 8.6–10.3)
CHLORIDE SERPL-SCNC: 102 MEQ/L (ref 98–107)
CHOLEST SERPL-MCNC: 122 MG/DL
CO2 SERPL-SCNC: 27 MEQ/L (ref 21–31)
CREAT SERPL-MCNC: 1.1 MG/DL (ref 0.7–1.3)
DIFFERENTIAL METHOD BLD: ABNORMAL
EGFRCR SERPLBLD CKD-EPI 2021: >60 ML/MIN/1.73M*2
EOSINOPHIL # BLD: 0.27 K/UL (ref 0.04–0.54)
EOSINOPHIL NFR BLD: 5.5 %
ERYTHROCYTE [DISTWIDTH] IN BLOOD BY AUTOMATED COUNT: 13.7 % (ref 11.6–14.4)
EST. AVERAGE GLUCOSE BLD GHB EST-MCNC: 197 MG/DL
GLUCOSE SERPL-MCNC: 135 MG/DL (ref 70–99)
HBA1C MFR BLD: 8.5 %
HCT VFR BLDCO AUTO: 42 % (ref 40.1–51)
HDLC SERPL-MCNC: 44 MG/DL
HDLC SERPL: 2.8 {RATIO}
HGB BLD-MCNC: 12.7 G/DL (ref 13.7–17.5)
IMM GRANULOCYTES # BLD AUTO: 0.01 K/UL (ref 0–0.08)
IMM GRANULOCYTES NFR BLD AUTO: 0.2 %
LDLC SERPL CALC-MCNC: 68 MG/DL
LYMPHOCYTES # BLD: 1.66 K/UL (ref 1.2–3.5)
LYMPHOCYTES NFR BLD: 34 %
MCH RBC QN AUTO: 26.8 PG (ref 28–33.2)
MCHC RBC AUTO-ENTMCNC: 30.2 G/DL (ref 32.2–36.5)
MCV RBC AUTO: 88.8 FL (ref 83–98)
MONOCYTES # BLD: 0.43 K/UL (ref 0.3–1)
MONOCYTES NFR BLD: 8.8 %
NEUTROPHILS # BLD: 2.48 K/UL (ref 1.7–7)
NEUTS SEG NFR BLD: 50.9 %
NONHDLC SERPL-MCNC: 78 MG/DL
NRBC BLD-RTO: 0 %
PDW BLD AUTO: 10.9 FL (ref 9.4–12.4)
PLATELET # BLD AUTO: 197 K/UL (ref 150–350)
POTASSIUM SERPL-SCNC: 4.3 MEQ/L (ref 3.5–5.1)
PROT SERPL-MCNC: 6.6 G/DL (ref 6–8.2)
RBC # BLD AUTO: 4.73 M/UL (ref 4.5–5.8)
SODIUM SERPL-SCNC: 139 MEQ/L (ref 136–145)
TRIGL SERPL-MCNC: 51 MG/DL
WBC # BLD AUTO: 4.88 K/UL (ref 3.8–10.5)

## 2024-01-06 PROCEDURE — 85025 COMPLETE CBC W/AUTO DIFF WBC: CPT

## 2024-01-06 PROCEDURE — 83036 HEMOGLOBIN GLYCOSYLATED A1C: CPT

## 2024-01-06 PROCEDURE — 80061 LIPID PANEL: CPT

## 2024-01-06 PROCEDURE — 36415 COLL VENOUS BLD VENIPUNCTURE: CPT

## 2024-01-06 PROCEDURE — 80053 COMPREHEN METABOLIC PANEL: CPT

## 2024-01-10 ENCOUNTER — OFFICE VISIT (OUTPATIENT)
Dept: FAMILY MEDICINE | Facility: CLINIC | Age: 54
End: 2024-01-10
Payer: COMMERCIAL

## 2024-01-10 VITALS
BODY MASS INDEX: 20.19 KG/M2 | SYSTOLIC BLOOD PRESSURE: 130 MMHG | RESPIRATION RATE: 16 BRPM | OXYGEN SATURATION: 96 % | HEART RATE: 125 BPM | HEIGHT: 70 IN | WEIGHT: 141 LBS | DIASTOLIC BLOOD PRESSURE: 70 MMHG | TEMPERATURE: 97 F

## 2024-01-10 DIAGNOSIS — E11.9 TYPE 2 DIABETES MELLITUS WITHOUT COMPLICATION, WITHOUT LONG-TERM CURRENT USE OF INSULIN (CMS/HCC): ICD-10-CM

## 2024-01-10 DIAGNOSIS — I10 ESSENTIAL HYPERTENSION: Primary | ICD-10-CM

## 2024-01-10 DIAGNOSIS — R00.0 TACHYCARDIA: ICD-10-CM

## 2024-01-10 DIAGNOSIS — Z12.5 SCREENING FOR MALIGNANT NEOPLASM OF PROSTATE: ICD-10-CM

## 2024-01-10 PROCEDURE — 3078F DIAST BP <80 MM HG: CPT | Performed by: INTERNAL MEDICINE

## 2024-01-10 PROCEDURE — 93000 ELECTROCARDIOGRAM COMPLETE: CPT | Performed by: INTERNAL MEDICINE

## 2024-01-10 PROCEDURE — 99214 OFFICE O/P EST MOD 30 MIN: CPT | Performed by: INTERNAL MEDICINE

## 2024-01-10 PROCEDURE — 3075F SYST BP GE 130 - 139MM HG: CPT | Performed by: INTERNAL MEDICINE

## 2024-01-10 PROCEDURE — 3008F BODY MASS INDEX DOCD: CPT | Performed by: INTERNAL MEDICINE

## 2024-01-10 RX ORDER — GLIPIZIDE 2.5 MG/1
2.5 TABLET, EXTENDED RELEASE ORAL
Qty: 90 TABLET | Refills: 1 | Status: SHIPPED | OUTPATIENT
Start: 2024-01-10 | End: 2024-03-30 | Stop reason: SDUPTHER

## 2024-01-10 NOTE — PROGRESS NOTES
"Stephane Kirby is a 53 y.o. male     53-year-old male presents for follow-up DM, HTN   Feels well no acute concerns   Reviewed labs   Lab Results       Component                Value               Date                       HGBA1C                   8.5 (H)             01/06/2024              Lab Results       Component                Value               Date                       CHOL                     122                 01/06/2024            Lab Results       Component                Value               Date                       HDL                      44                  01/06/2024            Lab Results       Component                Value               Date                       LDLCALC                  68                  01/06/2024            Lab Results       Component                Value               Date                       TRIG                     51                  01/06/2024              No results found for: \"CHOLHDL\"    HTN - no headaches, dizziness, lightheadedness, CP  Checking at home   Tolerating meds without side effects     No discretions with diet  Stays active  No change in vision no numbness paresthesias    Adherent with medications    Wt Readings from Last 3 Encounters:  01/10/24 : 64 kg (141 lb)  07/10/23 : 63.5 kg (140 lb)  04/03/23 : 64.9 kg (143 lb)          Past Medical History:   Diagnosis Date   • Glaucoma    • Lipid disorder    • Type 2 diabetes mellitus (CMS/HCC)        Past Surgical History:   Procedure Laterality Date   • EYE SURGERY     • GLAUCOMA SURGERY Bilateral         Social History     Socioeconomic History   • Marital status:      Spouse name: None   • Number of children: None   • Years of education: None   • Highest education level: None   Tobacco Use   • Smoking status: Former   • Smokeless tobacco: Never   Vaping Use   • Vaping Use: Never used   Substance and Sexual Activity   • Alcohol use: Not Currently     Comment: rarely   • Drug use: Not Currently "   • Sexual activity: Defer   Social History Narrative    [employer] [facility]      Social Determinants of Health     Food Insecurity: No Food Insecurity (8/6/2021)    Hunger Vital Sign    • Worried About Running Out of Food in the Last Year: Never true    • Ran Out of Food in the Last Year: Never true       Family History   Problem Relation Age of Onset   • Hypertension Biological Mother    • Diabetes Biological Mother    • Hyperlipidemia Biological Father    • Heart attack Biological Father        Patient has no known allergies.    Current Outpatient Medications   Medication Sig Dispense Refill   • cyanocobalamin (VITAMIN B12) 1,000 mcg tablet Take 1 tablet (1,000 mcg total) by mouth daily. 90 tablet 3   • empagliflozin (JARDIANCE) 25 mg tablet Take 1 tablet (25 mg total) by mouth daily. 90 tablet 1   • FREESTYLE JAKUB 14 DAY SENSOR kit USE AS DIRECTED 2 kit 3   • Lactobacillus acidophilus (PROBIOTIC) 10 billion cell capsule Take 2 capsules by mouth daily.     • latanoprost (XALATAN) 0.005 % ophthalmic solution Administer 1 drop into both eyes nightly.     • metFORMIN (GLUCOPHAGE) 1,000 mg tablet Take 1 tablet (1,000 mg total) by mouth 2 (two) times a day. 180 tablet 1   • simvastatin (ZOCOR) 20 mg tablet Take 1 tablet (20 mg total) by mouth nightly. 90 tablet 3   • timolol (TIMOPTIC) 0.5 % ophthalmic solution Administer 1 drop into both eyes daily.         No current facility-administered medications for this visit.       Review of Systems   Constitutional: Negative.    HENT: Negative.    Eyes: Negative.    Respiratory: Negative.    Cardiovascular: Negative.    Gastrointestinal: Negative.    Endocrine: Negative.    Genitourinary: Negative.    Musculoskeletal: Negative.    Skin: Negative.    Allergic/Immunologic: Negative.    Neurological: Negative.    Hematological: Negative.    Psychiatric/Behavioral: Negative.           Vitals:    01/10/24 1514   BP: 130/70   Pulse: (!) 125   Resp: 16   Temp: 36.1 °C (97 °F)   SpO2:  96%       Body mass index is 20.23 kg/m².      Physical Exam  Constitutional:       Appearance: Normal appearance.   HENT:      Right Ear: Tympanic membrane, ear canal and external ear normal.      Left Ear: Tympanic membrane, ear canal and external ear normal.   Eyes:      Extraocular Movements: Extraocular movements intact.      Conjunctiva/sclera: Conjunctivae normal.      Pupils: Pupils are equal, round, and reactive to light.      Comments: anicteric no pallor         Cardiovascular:      Rate and Rhythm: Normal rate and regular rhythm.      Pulses: Normal pulses.      Heart sounds: Normal heart sounds. No murmur heard.  Pulmonary:      Effort: Pulmonary effort is normal.      Breath sounds: Normal breath sounds. No wheezing or rhonchi.   Abdominal:      General: Abdomen is flat. Bowel sounds are normal.      Palpations: Abdomen is soft. There is no mass.      Tenderness: There is no abdominal tenderness.   Musculoskeletal:      Right lower leg: No edema.      Left lower leg: No edema.   Neurological:      General: No focal deficit present.      Mental Status: He is alert and oriented to person, place, and time.   Psychiatric:         Mood and Affect: Mood normal.         Behavior: Behavior normal.          Assessment/Plan      Diagnoses and all orders for this visit:    Essential hypertension (Primary)  -     ECG 12 LEAD OFFICE PERFORMED  Sinus 110 Axis within normal limits no ST changes no T wave inversions no change from prior  Echo continue current meds  Type 2 diabetes mellitus without complication, without long-term current use of insulin (CMS/[facility])  -     Hemoglobin A1c; Future  -     Comprehensive metabolic panel; Future  -     CBC and differential; Future  -     Lipid panel; Future  -     Microalbumin / creatinine urine ratio; Future  A1c elevated counseled on lifestyle changes for blood glucose reduction  Will add glipizide  Screening for malignant neoplasm of prostate  -     PSA; Future    Other  orders  -     glipiZIDE (GLUCOTROL XL) 2.5 mg 24 hr tablet; Take 1 tablet (2.5 mg total) by mouth daily with breakfast.          No orders of the defined types were placed in this encounter.      There are no discontinued medications.     No orders of the defined types were placed in this encounter.       Olivier Melchor,   1/10/2024

## 2024-01-18 ASSESSMENT — ENCOUNTER SYMPTOMS
CONSTITUTIONAL NEGATIVE: 1
GASTROINTESTINAL NEGATIVE: 1
NEUROLOGICAL NEGATIVE: 1
RESPIRATORY NEGATIVE: 1
HEMATOLOGIC/LYMPHATIC NEGATIVE: 1
ALLERGIC/IMMUNOLOGIC NEGATIVE: 1
EYES NEGATIVE: 1
MUSCULOSKELETAL NEGATIVE: 1
PSYCHIATRIC NEGATIVE: 1
CARDIOVASCULAR NEGATIVE: 1
ENDOCRINE NEGATIVE: 1

## 2024-01-30 DIAGNOSIS — E11.9 TYPE 2 DIABETES MELLITUS WITHOUT COMPLICATION, WITHOUT LONG-TERM CURRENT USE OF INSULIN (CMS/HCC): ICD-10-CM

## 2024-01-30 RX ORDER — FLASH GLUCOSE SENSOR
KIT MISCELLANEOUS
Qty: 2 KIT | Refills: 3 | Status: SHIPPED | OUTPATIENT
Start: 2024-01-30 | End: 2024-05-14

## 2024-01-30 NOTE — TELEPHONE ENCOUNTER
Medicine Refill Request    Last Office Visit: 1/10/2024   Last Consult Visit: Visit date not found  Last Telemedicine Visit: Visit date not found    Next Appointment: 7/12/2024      Current Outpatient Medications:   •  cyanocobalamin (VITAMIN B12) 1,000 mcg tablet, Take 1 tablet (1,000 mcg total) by mouth daily., Disp: 90 tablet, Rfl: 3  •  empagliflozin (JARDIANCE) 25 mg tablet, Take 1 tablet (25 mg total) by mouth daily., Disp: 90 tablet, Rfl: 1  •  FREESTYLE JAKUB 14 DAY SENSOR kit, USE AS DIRECTED, Disp: 2 kit, Rfl: 3  •  glipiZIDE (GLUCOTROL XL) 2.5 mg 24 hr tablet, Take 1 tablet (2.5 mg total) by mouth daily with breakfast., Disp: 90 tablet, Rfl: 1  •  Lactobacillus acidophilus (PROBIOTIC) 10 billion cell capsule, Take 2 capsules by mouth daily., Disp: , Rfl:   •  latanoprost (XALATAN) 0.005 % ophthalmic solution, Administer 1 drop into both eyes nightly., Disp: , Rfl:   •  metFORMIN (GLUCOPHAGE) 1,000 mg tablet, Take 1 tablet (1,000 mg total) by mouth 2 (two) times a day., Disp: 180 tablet, Rfl: 1  •  simvastatin (ZOCOR) 20 mg tablet, Take 1 tablet (20 mg total) by mouth nightly., Disp: 90 tablet, Rfl: 3  •  timolol (TIMOPTIC) 0.5 % ophthalmic solution, Administer 1 drop into both eyes daily.  , Disp: , Rfl:       BP Readings from Last 3 Encounters:   01/10/24 130/70   07/10/23 115/72   04/03/23 122/68       Recent Lab results:  Lab Results   Component Value Date    CHOL 122 01/06/2024   ,   Lab Results   Component Value Date    HDL 44 01/06/2024   ,   Lab Results   Component Value Date    LDLCALC 68 01/06/2024   ,   Lab Results   Component Value Date    TRIG 51 01/06/2024        Lab Results   Component Value Date    GLUCOSE 135 (H) 01/06/2024   ,   Lab Results   Component Value Date    HGBA1C 8.5 (H) 01/06/2024         Lab Results   Component Value Date    CREATININE 1.1 01/06/2024       Lab Results   Component Value Date    TSH 1.57 08/06/2021           Lab Results   Component Value Date    HGBA1C 8.5 (H)  01/06/2024

## 2024-03-30 DIAGNOSIS — E11.9 TYPE 2 DIABETES MELLITUS WITHOUT COMPLICATION, WITHOUT LONG-TERM CURRENT USE OF INSULIN (CMS/HCC): ICD-10-CM

## 2024-04-01 RX ORDER — GLIPIZIDE 2.5 MG/1
2.5 TABLET, EXTENDED RELEASE ORAL
Qty: 90 TABLET | Refills: 1 | Status: SHIPPED | OUTPATIENT
Start: 2024-04-01 | End: 2024-10-07

## 2024-04-01 NOTE — TELEPHONE ENCOUNTER
Medicine Refill Request    Last Office Visit: 1/10/2024   Last Consult Visit: Visit date not found  Last Telemedicine Visit: Visit date not found    Next Appointment: 7/12/2024      Current Outpatient Medications:     cyanocobalamin (VITAMIN B12) 1,000 mcg tablet, Take 1 tablet (1,000 mcg total) by mouth daily., Disp: 90 tablet, Rfl: 3    empagliflozin (JARDIANCE) 25 mg tablet, Take 1 tablet (25 mg total) by mouth daily., Disp: 90 tablet, Rfl: 1    FREESTYLE JAKUB 14 DAY SENSOR kit, USE AS DIRECTED, Disp: 2 kit, Rfl: 3    glipiZIDE (GLUCOTROL XL) 2.5 mg 24 hr tablet, Take 1 tablet (2.5 mg total) by mouth daily with breakfast., Disp: 90 tablet, Rfl: 1    Lactobacillus acidophilus (PROBIOTIC) 10 billion cell capsule, Take 2 capsules by mouth daily., Disp: , Rfl:     latanoprost (XALATAN) 0.005 % ophthalmic solution, Administer 1 drop into both eyes nightly., Disp: , Rfl:     metFORMIN (GLUCOPHAGE) 1,000 mg tablet, Take 1 tablet (1,000 mg total) by mouth 2 (two) times a day., Disp: 180 tablet, Rfl: 1    simvastatin (ZOCOR) 20 mg tablet, Take 1 tablet (20 mg total) by mouth nightly., Disp: 90 tablet, Rfl: 3    timolol (TIMOPTIC) 0.5 % ophthalmic solution, Administer 1 drop into both eyes daily.  , Disp: , Rfl:       BP Readings from Last 3 Encounters:   01/10/24 130/70   07/10/23 115/72   04/03/23 122/68       Recent Lab results:  Lab Results   Component Value Date    CHOL 122 01/06/2024   ,   Lab Results   Component Value Date    HDL 44 01/06/2024   ,   Lab Results   Component Value Date    LDLCALC 68 01/06/2024   ,   Lab Results   Component Value Date    TRIG 51 01/06/2024        Lab Results   Component Value Date    GLUCOSE 135 (H) 01/06/2024   ,   Lab Results   Component Value Date    HGBA1C 8.5 (H) 01/06/2024         Lab Results   Component Value Date    CREATININE 1.1 01/06/2024       Lab Results   Component Value Date    TSH 1.57 08/06/2021           Lab Results   Component Value Date    HGBA1C 8.5 (H) 01/06/2024

## 2024-05-14 ENCOUNTER — OFFICE VISIT (OUTPATIENT)
Dept: FAMILY MEDICINE | Facility: CLINIC | Age: 54
End: 2024-05-14
Payer: COMMERCIAL

## 2024-05-14 VITALS
BODY MASS INDEX: 20.33 KG/M2 | DIASTOLIC BLOOD PRESSURE: 70 MMHG | TEMPERATURE: 98 F | HEIGHT: 70 IN | OXYGEN SATURATION: 96 % | RESPIRATION RATE: 16 BRPM | WEIGHT: 142 LBS | SYSTOLIC BLOOD PRESSURE: 130 MMHG | HEART RATE: 71 BPM

## 2024-05-14 DIAGNOSIS — E11.9 TYPE 2 DIABETES MELLITUS WITHOUT COMPLICATION, WITHOUT LONG-TERM CURRENT USE OF INSULIN (CMS/HCC): ICD-10-CM

## 2024-05-14 DIAGNOSIS — R59.1 LYMPHADENOPATHY: Primary | ICD-10-CM

## 2024-05-14 PROCEDURE — 99212 OFFICE O/P EST SF 10 MIN: CPT | Performed by: FAMILY MEDICINE

## 2024-05-14 PROCEDURE — 3075F SYST BP GE 130 - 139MM HG: CPT | Performed by: FAMILY MEDICINE

## 2024-05-14 PROCEDURE — 3078F DIAST BP <80 MM HG: CPT | Performed by: FAMILY MEDICINE

## 2024-05-14 PROCEDURE — 3008F BODY MASS INDEX DOCD: CPT | Performed by: FAMILY MEDICINE

## 2024-05-14 RX ORDER — FLASH GLUCOSE SENSOR
KIT MISCELLANEOUS
Qty: 2 KIT | Refills: 3 | Status: SHIPPED | OUTPATIENT
Start: 2024-05-14

## 2024-05-14 ASSESSMENT — PATIENT HEALTH QUESTIONNAIRE - PHQ9: SUM OF ALL RESPONSES TO PHQ9 QUESTIONS 1 & 2: 0

## 2024-05-14 NOTE — PROGRESS NOTES
"    Subjective      Patient ID: Stephane Kirby is a 54 y.o. male.  1970      This 54-year-old -American male usually followed by Dr. Melchor who is diabetic presents complaining of a lump on the right side of his neck which he noticed yesterday.  He is quite certain it was not there the day before.  It is not painful he denies a sore throat.  He saw his dentist 2 to 3 weeks ago and got a clean bill of health.  He has no dental pain.  He has no fever or chills.  He has slight stuffiness in his nose.        The following have been reviewed and updated as appropriate in this visit:   Tobacco  Allergies  Meds  Problems  Med Hx  Surg Hx  Fam Hx       Review of Systems    Objective     Vitals:    05/14/24 1501   BP: 130/70   BP Location: Left upper arm   Patient Position: Sitting   Pulse: 71   Resp: 16   Temp: 36.7 °C (98 °F)   TempSrc: Oral   SpO2: 96%   Weight: 64.4 kg (142 lb)   Height: 1.778 m (5' 10\")     Body mass index is 20.37 kg/m².    Physical Exam  Vitals reviewed.   Constitutional:       General: He is not in acute distress.     Appearance: He is normal weight. He is not ill-appearing.   Neck:      Comments: He has a 1 cm right submandibular lymph node which is nontender or fluctuant.  There is no other cervical adenopathy or any supraclavicular adenopathy.  Musculoskeletal:      Cervical back: Neck supple.   Neurological:      Mental Status: He is alert.         Assessment/Plan   Diagnoses and all orders for this visit:    Lymphadenopathy (Primary)  Assessment & Plan:  Right submandibular swollen lymph node which is improved today compared to yesterday and without any other evidence of infection.  Suspect reactive and will manage it conservatively for now.  If it still present 2 weeks from now he will let us know so we can treat him with a course of antibiotics and if it persist beyond that we will image it.            "

## 2024-05-14 NOTE — ASSESSMENT & PLAN NOTE
Right submandibular swollen lymph node which is improved today compared to yesterday and without any other evidence of infection.  Suspect reactive and will manage it conservatively for now.  If it still present 2 weeks from now he will let us know so we can treat him with a course of antibiotics and if it persist beyond that we will image it.

## 2024-07-06 ENCOUNTER — TRANSCRIBE ORDERS (OUTPATIENT)
Dept: LAB | Facility: HOSPITAL | Age: 54
End: 2024-07-06

## 2024-07-06 ENCOUNTER — APPOINTMENT (OUTPATIENT)
Dept: LAB | Facility: HOSPITAL | Age: 54
End: 2024-07-06
Attending: INTERNAL MEDICINE
Payer: COMMERCIAL

## 2024-07-06 DIAGNOSIS — E11.9 TYPE 2 DIABETES MELLITUS WITHOUT COMPLICATIONS (CMS/HCC): Primary | ICD-10-CM

## 2024-07-06 DIAGNOSIS — Z12.5 ENCOUNTER FOR SCREENING FOR MALIGNANT NEOPLASM OF PROSTATE: ICD-10-CM

## 2024-07-06 DIAGNOSIS — E11.9 TYPE 2 DIABETES MELLITUS WITHOUT COMPLICATIONS (CMS/HCC): ICD-10-CM

## 2024-07-06 LAB
ALBUMIN SERPL-MCNC: 4.3 G/DL (ref 3.5–5.7)
ALP SERPL-CCNC: 51 IU/L (ref 34–125)
ALT SERPL-CCNC: 12 IU/L (ref 7–52)
ANION GAP SERPL CALC-SCNC: 10 MEQ/L (ref 3–15)
AST SERPL-CCNC: 14 IU/L (ref 13–39)
BASOPHILS # BLD: 0.04 K/UL (ref 0.01–0.1)
BASOPHILS NFR BLD: 0.7 %
BILIRUB SERPL-MCNC: 0.3 MG/DL (ref 0.3–1.2)
BUN SERPL-MCNC: 20 MG/DL (ref 7–25)
CALCIUM SERPL-MCNC: 9.4 MG/DL (ref 8.6–10.3)
CHLORIDE SERPL-SCNC: 105 MEQ/L (ref 98–107)
CHOLEST SERPL-MCNC: 143 MG/DL
CO2 SERPL-SCNC: 24 MEQ/L (ref 21–31)
CREAT SERPL-MCNC: 1.2 MG/DL (ref 0.7–1.3)
CREAT UR-MCNC: 79.1 MG/DL
DIFFERENTIAL METHOD BLD: ABNORMAL
EGFRCR SERPLBLD CKD-EPI 2021: >60 ML/MIN/1.73M*2
EOSINOPHIL # BLD: 0.23 K/UL (ref 0.04–0.54)
EOSINOPHIL NFR BLD: 4 %
ERYTHROCYTE [DISTWIDTH] IN BLOOD BY AUTOMATED COUNT: 14.1 % (ref 11.6–14.4)
EST. AVERAGE GLUCOSE BLD GHB EST-MCNC: 169 MG/DL
GLUCOSE SERPL-MCNC: 117 MG/DL (ref 70–99)
HBA1C MFR BLD: 7.5 %
HCT VFR BLD AUTO: 40.2 % (ref 40.1–51)
HDLC SERPL-MCNC: 44 MG/DL
HDLC SERPL: 3.3 {RATIO}
HGB BLD-MCNC: 12.2 G/DL (ref 13.7–17.5)
IMM GRANULOCYTES # BLD AUTO: 0.02 K/UL (ref 0–0.08)
IMM GRANULOCYTES NFR BLD AUTO: 0.4 %
LDLC SERPL CALC-MCNC: 88 MG/DL
LYMPHOCYTES # BLD: 1.5 K/UL (ref 1.2–3.5)
LYMPHOCYTES NFR BLD: 26.3 %
MCH RBC QN AUTO: 26.3 PG (ref 28–33.2)
MCHC RBC AUTO-ENTMCNC: 30.3 G/DL (ref 32.2–36.5)
MCV RBC AUTO: 86.6 FL (ref 83–98)
MICROALBUMIN UR-MCNC: 3.2 MG/L
MICROALBUMIN/CREAT UR: 4 UG/MG
MONOCYTES # BLD: 0.43 K/UL (ref 0.3–1)
MONOCYTES NFR BLD: 7.5 %
NEUTROPHILS # BLD: 3.48 K/UL (ref 1.7–7)
NEUTS SEG NFR BLD: 61.1 %
NONHDLC SERPL-MCNC: 99 MG/DL
NRBC BLD-RTO: 0 %
PDW BLD AUTO: 10.3 FL (ref 9.4–12.4)
PLATELET # BLD AUTO: 187 K/UL (ref 150–350)
POTASSIUM SERPL-SCNC: 4.3 MEQ/L (ref 3.5–5.1)
PROT SERPL-MCNC: 6.7 G/DL (ref 6–8.2)
PSA SERPL-MCNC: 1.63 NG/ML
RBC # BLD AUTO: 4.64 M/UL (ref 4.5–5.8)
SODIUM SERPL-SCNC: 139 MEQ/L (ref 136–145)
TRIGL SERPL-MCNC: 55 MG/DL
WBC # BLD AUTO: 5.7 K/UL (ref 3.8–10.5)

## 2024-07-06 PROCEDURE — 82570 ASSAY OF URINE CREATININE: CPT

## 2024-07-06 PROCEDURE — 84153 ASSAY OF PSA TOTAL: CPT

## 2024-07-06 PROCEDURE — 83036 HEMOGLOBIN GLYCOSYLATED A1C: CPT

## 2024-07-06 PROCEDURE — 36415 COLL VENOUS BLD VENIPUNCTURE: CPT

## 2024-07-06 PROCEDURE — 80061 LIPID PANEL: CPT

## 2024-07-06 PROCEDURE — 85025 COMPLETE CBC W/AUTO DIFF WBC: CPT

## 2024-07-06 PROCEDURE — 82565 ASSAY OF CREATININE: CPT

## 2024-07-11 LAB — MLHC DIABETIC EYE EXAM (EXTERNAL): NORMAL

## 2024-07-12 ENCOUNTER — OFFICE VISIT (OUTPATIENT)
Dept: FAMILY MEDICINE | Facility: CLINIC | Age: 54
End: 2024-07-12
Payer: COMMERCIAL

## 2024-07-12 VITALS
OXYGEN SATURATION: 99 % | TEMPERATURE: 98.6 F | SYSTOLIC BLOOD PRESSURE: 118 MMHG | BODY MASS INDEX: 20.9 KG/M2 | DIASTOLIC BLOOD PRESSURE: 64 MMHG | HEIGHT: 70 IN | WEIGHT: 146 LBS | HEART RATE: 97 BPM | RESPIRATION RATE: 16 BRPM

## 2024-07-12 DIAGNOSIS — E11.9 TYPE 2 DIABETES MELLITUS WITHOUT COMPLICATION, WITHOUT LONG-TERM CURRENT USE OF INSULIN (CMS/HCC): ICD-10-CM

## 2024-07-12 DIAGNOSIS — E78.5 DYSLIPIDEMIA: ICD-10-CM

## 2024-07-12 DIAGNOSIS — I10 ESSENTIAL HYPERTENSION: ICD-10-CM

## 2024-07-12 DIAGNOSIS — Z00.00 PREVENTATIVE HEALTH CARE: Primary | ICD-10-CM

## 2024-07-12 PROCEDURE — 3078F DIAST BP <80 MM HG: CPT | Performed by: INTERNAL MEDICINE

## 2024-07-12 PROCEDURE — 3074F SYST BP LT 130 MM HG: CPT | Performed by: INTERNAL MEDICINE

## 2024-07-12 PROCEDURE — 99396 PREV VISIT EST AGE 40-64: CPT | Performed by: INTERNAL MEDICINE

## 2024-07-12 PROCEDURE — 3008F BODY MASS INDEX DOCD: CPT | Performed by: INTERNAL MEDICINE

## 2024-07-12 ASSESSMENT — ENCOUNTER SYMPTOMS
ALLERGIC/IMMUNOLOGIC NEGATIVE: 1
CONSTITUTIONAL NEGATIVE: 1
GASTROINTESTINAL NEGATIVE: 1
EYES NEGATIVE: 1
ENDOCRINE NEGATIVE: 1
MUSCULOSKELETAL NEGATIVE: 1
NEUROLOGICAL NEGATIVE: 1
RESPIRATORY NEGATIVE: 1
CARDIOVASCULAR NEGATIVE: 1
PSYCHIATRIC NEGATIVE: 1
HEMATOLOGIC/LYMPHATIC NEGATIVE: 1

## 2024-07-12 ASSESSMENT — PATIENT HEALTH QUESTIONNAIRE - PHQ9: SUM OF ALL RESPONSES TO PHQ9 QUESTIONS 1 & 2: 0

## 2024-07-12 NOTE — PROGRESS NOTES
"Stephane Kirby is a 54 y.o. male     55 y/o male presents for CPE   Feels well no acute issues or concerns  Reviewed labs  Lab Results       Component                Value               Date                       HGBA1C                   7.5 (H)             07/06/2024            Improved since initiation of glipizide down from 8.5    Lab Results       Component                Value               Date                       CHOL                     143                 07/06/2024            Lab Results       Component                Value               Date                       HDL                      44                  07/06/2024            Lab Results       Component                Value               Date                       LDLCALC                  88                  07/06/2024            Lab Results       Component                Value               Date                       TRIG                     55                  07/06/2024              No results found for: \"CHOLHDL\"    Vision no changes since saw ophthalmology yesterday  Dental every 6 months  NO CP, SOB   Activity remains very active  Memory no cognitive concerns  Mood good  Bowel, bladder no issues   Diet no discretions but health conscious   Sleeping well            Past Medical History:   Diagnosis Date    Glaucoma     Lipid disorder     Type 2 diabetes mellitus (CMS/HCC)        Past Surgical History:   Procedure Laterality Date    EYE SURGERY      GLAUCOMA SURGERY Bilateral         Social History     Socioeconomic History    Marital status:      Spouse name: None    Number of children: None    Years of education: None    Highest education level: None   Tobacco Use    Smoking status: Former    Smokeless tobacco: Never   Vaping Use    Vaping Use: Never used   Substance and Sexual Activity    Alcohol use: Not Currently     Comment: rarely    Drug use: Not Currently    Sexual activity: Defer   Social History Narrative    Sept[employer] Karsten  "     Social Determinants of Health     Food Insecurity: No Food Insecurity (8/6/2021)    Hunger Vital Sign     Worried About Running Out of Food in the Last Year: Never true     Ran Out of Food in the Last Year: Never true       Family History   Problem Relation Age of Onset    Hypertension Biological Mother     Diabetes Biological Mother     Hyperlipidemia Biological Father     Heart attack Biological Father        Patient has no known allergies.    Current Outpatient Medications   Medication Sig Dispense Refill    cyanocobalamin (VITAMIN B12) 1,000 mcg tablet Take 1 tablet (1,000 mcg total) by mouth daily. 90 tablet 3    empagliflozin (JARDIANCE) 25 mg tablet Take 1 tablet (25 mg total) by mouth daily. 90 tablet 1    FREESTYLE JAKUB 14 DAY SENSOR kit USE AS DIRECTED 2 kit 3    glipiZIDE (GLUCOTROL XL) 2.5 mg 24 hr tablet Take 1 tablet (2.5 mg total) by mouth daily with breakfast. 90 tablet 1    Lactobacillus acidophilus (PROBIOTIC) 10 billion cell capsule Take 2 capsules by mouth daily.      latanoprost (XALATAN) 0.005 % ophthalmic solution Administer 1 drop into both eyes nightly.      metFORMIN (GLUCOPHAGE) 1,000 mg tablet Take 1 tablet (1,000 mg total) by mouth 2 (two) times a day. 180 tablet 1    simvastatin (ZOCOR) 20 mg tablet Take 1 tablet (20 mg total) by mouth nightly. 90 tablet 3    timolol (TIMOPTIC) 0.5 % ophthalmic solution Administer 1 drop into both eyes daily.         No current facility-administered medications for this visit.       Review of Systems   Constitutional: Negative.    HENT: Negative.     Eyes: Negative.    Respiratory: Negative.     Cardiovascular: Negative.    Gastrointestinal: Negative.    Endocrine: Negative.    Genitourinary: Negative.    Musculoskeletal: Negative.    Skin: Negative.    Allergic/Immunologic: Negative.    Neurological: Negative.    Hematological: Negative.    Psychiatric/Behavioral: Negative.            Vitals:    07/12/24 1258   BP: 118/64   Pulse: 97   Resp: 16    Temp: 37 °C (98.6 °F)   SpO2: 99%       Body mass index is 20.95 kg/m².      Physical Exam  Constitutional:       Appearance: Normal appearance.   HENT:      Right Ear: Tympanic membrane, ear canal and external ear normal.      Left Ear: Tympanic membrane, ear canal and external ear normal.   Eyes:      Extraocular Movements: Extraocular movements intact.      Conjunctiva/sclera: Conjunctivae normal.      Pupils: Pupils are equal, round, and reactive to light.      Comments: anicteric no pallor         Cardiovascular:      Rate and Rhythm: Normal rate and regular rhythm.      Pulses: Normal pulses.      Heart sounds: Normal heart sounds. No murmur heard.  Pulmonary:      Effort: Pulmonary effort is normal.      Breath sounds: Normal breath sounds. No wheezing or rhonchi.   Abdominal:      General: Abdomen is flat. Bowel sounds are normal.      Palpations: Abdomen is soft. There is no mass.      Tenderness: There is no abdominal tenderness.   Musculoskeletal:      Right lower leg: No edema.      Left lower leg: No edema.   Neurological:      General: No focal deficit present.      Mental Status: He is alert and oriented to person, place, and time.   Psychiatric:         Mood and Affect: Mood normal.         Behavior: Behavior normal.          Assessment/Plan      Diagnoses and all orders for this visit:    Preventative health care (Primary)  Declines shingles vaccine  Colonoscopy up-to-date  Type 2 diabetes mellitus without complication, without long-term current use of insulin (CMS/[facility])  -     Hemoglobin A1c; Future  -     Comprehensive metabolic panel; Future  -     CBC and differential; Future  -     Lipid panel; Future  Continue current regimen of medication A1c not quite at goal but improved at 7.5 follow-up 6 months  Dyslipidemia  Current Outpatient Medications on File Prior to Visit   Medication Sig Dispense Refill    cyanocobalamin (VITAMIN B12) 1,000 mcg tablet Take 1 tablet (1,000 mcg total) by mouth  daily. 90 tablet 3    empagliflozin (JARDIANCE) 25 mg tablet Take 1 tablet (25 mg total) by mouth daily. 90 tablet 1    FREESTYLE JAKUB 14 DAY SENSOR kit USE AS DIRECTED 2 kit 3    glipiZIDE (GLUCOTROL XL) 2.5 mg 24 hr tablet Take 1 tablet (2.5 mg total) by mouth daily with breakfast. 90 tablet 1    Lactobacillus acidophilus (PROBIOTIC) 10 billion cell capsule Take 2 capsules by mouth daily.      latanoprost (XALATAN) 0.005 % ophthalmic solution Administer 1 drop into both eyes nightly.      metFORMIN (GLUCOPHAGE) 1,000 mg tablet Take 1 tablet (1,000 mg total) by mouth 2 (two) times a day. 180 tablet 1    simvastatin (ZOCOR) 20 mg tablet Take 1 tablet (20 mg total) by mouth nightly. 90 tablet 3    timolol (TIMOPTIC) 0.5 % ophthalmic solution Administer 1 drop into both eyes daily.         No current facility-administered medications on file prior to visit.     On statin LDL at goal  Essential hypertension    Blood pressure well-controlled      No orders of the defined types were placed in this encounter.      There are no discontinued medications.     No orders of the defined types were placed in this encounter.       Olivier Melchor, DO  7/12/2024

## 2024-07-23 ENCOUNTER — DOCUMENTATION (OUTPATIENT)
Dept: FAMILY MEDICINE | Facility: CLINIC | Age: 54
End: 2024-07-23
Payer: COMMERCIAL

## 2024-07-23 NOTE — PROGRESS NOTES
Jes Harley MA has completed a chart review for Stephane Kirby and have determined that the care gap has been satisfied.    Care Gap Source: [facility]    Care Gap(s) Identified: Diabetic Hemoglobin A1c, Diabetic Nephropathy Screening    Chart Review Completed: Yes    Hgb A1c completed on 7/6/24, value of 7.5.    Urine microalbumin completed on 7/6/24.

## 2024-08-13 DIAGNOSIS — E11.9 TYPE 2 DIABETES MELLITUS WITHOUT COMPLICATION, WITHOUT LONG-TERM CURRENT USE OF INSULIN (CMS/HCC): ICD-10-CM

## 2024-08-14 RX ORDER — METFORMIN HYDROCHLORIDE 1000 MG/1
1000 TABLET ORAL 2 TIMES DAILY
Qty: 180 TABLET | Refills: 1 | Status: SHIPPED | OUTPATIENT
Start: 2024-08-14 | End: 2025-02-10

## 2024-08-14 NOTE — TELEPHONE ENCOUNTER
Medicine Refill Request    Last Office Visit: 7/12/2024   Last Consult Visit: Visit date not found  Last Telemedicine Visit: Visit date not found    Next Appointment: 1/13/2025      Current Outpatient Medications:     cyanocobalamin (VITAMIN B12) 1,000 mcg tablet, Take 1 tablet (1,000 mcg total) by mouth daily., Disp: 90 tablet, Rfl: 3    empagliflozin (JARDIANCE) 25 mg tablet, Take 1 tablet (25 mg total) by mouth daily., Disp: 90 tablet, Rfl: 1    FREESTYLE JAKUB 14 DAY SENSOR kit, USE AS DIRECTED, Disp: 2 kit, Rfl: 3    glipiZIDE (GLUCOTROL XL) 2.5 mg 24 hr tablet, Take 1 tablet (2.5 mg total) by mouth daily with breakfast., Disp: 90 tablet, Rfl: 1    Lactobacillus acidophilus (PROBIOTIC) 10 billion cell capsule, Take 2 capsules by mouth daily., Disp: , Rfl:     latanoprost (XALATAN) 0.005 % ophthalmic solution, Administer 1 drop into both eyes nightly., Disp: , Rfl:     metFORMIN (GLUCOPHAGE) 1,000 mg tablet, Take 1 tablet (1,000 mg total) by mouth 2 (two) times a day., Disp: 180 tablet, Rfl: 1    simvastatin (ZOCOR) 20 mg tablet, Take 1 tablet (20 mg total) by mouth nightly., Disp: 90 tablet, Rfl: 3    timolol (TIMOPTIC) 0.5 % ophthalmic solution, Administer 1 drop into both eyes daily.  , Disp: , Rfl:       BP Readings from Last 3 Encounters:   07/12/24 118/64   05/14/24 130/70   01/10/24 130/70       Recent Lab results:  Lab Results   Component Value Date    CHOL 143 07/06/2024   ,   Lab Results   Component Value Date    HDL 44 07/06/2024   ,   Lab Results   Component Value Date    LDLCALC 88 07/06/2024   ,   Lab Results   Component Value Date    TRIG 55 07/06/2024        Lab Results   Component Value Date    GLUCOSE 117 (H) 07/06/2024   ,   Lab Results   Component Value Date    HGBA1C 7.5 (H) 07/06/2024         Lab Results   Component Value Date    CREATININE 1.2 07/06/2024       Lab Results   Component Value Date    TSH 1.57 08/06/2021           Lab Results   Component Value Date    HGBA1C 7.5 (H) 07/06/2024

## 2024-10-06 DIAGNOSIS — E11.9 TYPE 2 DIABETES MELLITUS WITHOUT COMPLICATION, WITHOUT LONG-TERM CURRENT USE OF INSULIN (CMS/HCC): ICD-10-CM

## 2024-10-07 RX ORDER — GLIPIZIDE 2.5 MG/1
2.5 TABLET, EXTENDED RELEASE ORAL
Qty: 90 TABLET | Refills: 3 | Status: SHIPPED | OUTPATIENT
Start: 2024-10-07 | End: 2025-01-13

## 2024-10-07 NOTE — TELEPHONE ENCOUNTER
Medicine Refill Request    Last Office Visit: 7/12/2024   Last Consult Visit: Visit date not found  Last Telemedicine Visit: Visit date not found    Next Appointment: 1/13/2025      Current Outpatient Medications:     cyanocobalamin (VITAMIN B12) 1,000 mcg tablet, Take 1 tablet (1,000 mcg total) by mouth daily., Disp: 90 tablet, Rfl: 3    empagliflozin (JARDIANCE) 25 mg tablet, Take 1 tablet (25 mg total) by mouth daily., Disp: 90 tablet, Rfl: 1    FREESTYLE JAKUB 14 DAY SENSOR kit, USE AS DIRECTED, Disp: 2 kit, Rfl: 3    glipiZIDE (GLUCOTROL XL) 2.5 mg 24 hr tablet, Take 1 tablet (2.5 mg total) by mouth daily with breakfast., Disp: 90 tablet, Rfl: 1    Lactobacillus acidophilus (PROBIOTIC) 10 billion cell capsule, Take 2 capsules by mouth daily., Disp: , Rfl:     latanoprost (XALATAN) 0.005 % ophthalmic solution, Administer 1 drop into both eyes nightly., Disp: , Rfl:     metFORMIN (GLUCOPHAGE) 1,000 mg tablet, TAKE 1 TABLET BY MOUTH TWICE A DAY, Disp: 180 tablet, Rfl: 1    simvastatin (ZOCOR) 20 mg tablet, Take 1 tablet (20 mg total) by mouth nightly., Disp: 90 tablet, Rfl: 3    timolol (TIMOPTIC) 0.5 % ophthalmic solution, Administer 1 drop into both eyes daily.  , Disp: , Rfl:       BP Readings from Last 3 Encounters:   07/12/24 118/64   05/14/24 130/70   01/10/24 130/70       Recent Lab results:  Lab Results   Component Value Date    CHOL 143 07/06/2024   ,   Lab Results   Component Value Date    HDL 44 07/06/2024   ,   Lab Results   Component Value Date    LDLCALC 88 07/06/2024   ,   Lab Results   Component Value Date    TRIG 55 07/06/2024        Lab Results   Component Value Date    GLUCOSE 117 (H) 07/06/2024   ,   Lab Results   Component Value Date    HGBA1C 7.5 (H) 07/06/2024         Lab Results   Component Value Date    CREATININE 1.2 07/06/2024       Lab Results   Component Value Date    TSH 1.57 08/06/2021           Lab Results   Component Value Date    HGBA1C 7.5 (H) 07/06/2024

## 2025-01-02 DIAGNOSIS — E78.5 DYSLIPIDEMIA: ICD-10-CM

## 2025-01-02 RX ORDER — SIMVASTATIN 20 MG/1
20 TABLET, FILM COATED ORAL SEE ADMIN INSTRUCTIONS
Qty: 90 TABLET | Refills: 3 | Status: SHIPPED | OUTPATIENT
Start: 2025-01-02

## 2025-01-02 NOTE — TELEPHONE ENCOUNTER
Medicine Refill Request    Last Office Visit: 7/12/2024   Last Consult Visit: Visit date not found  Last Telemedicine Visit: Visit date not found    Next Appointment: 1/13/2025      Current Outpatient Medications:     cyanocobalamin (VITAMIN B12) 1,000 mcg tablet, Take 1 tablet (1,000 mcg total) by mouth daily., Disp: 90 tablet, Rfl: 3    empagliflozin (JARDIANCE) 25 mg tablet, Take 1 tablet (25 mg total) by mouth daily., Disp: 90 tablet, Rfl: 3    FREESTYLE JAKUB 14 DAY SENSOR kit, USE AS DIRECTED, Disp: 2 kit, Rfl: 3    glipiZIDE (GLUCOTROL XL) 2.5 mg 24 hr tablet, Take 1 tablet (2.5 mg total) by mouth daily with breakfast., Disp: 90 tablet, Rfl: 3    Lactobacillus acidophilus (PROBIOTIC) 10 billion cell capsule, Take 2 capsules by mouth daily., Disp: , Rfl:     latanoprost (XALATAN) 0.005 % ophthalmic solution, Administer 1 drop into both eyes nightly., Disp: , Rfl:     metFORMIN (GLUCOPHAGE) 1,000 mg tablet, TAKE 1 TABLET BY MOUTH TWICE A DAY, Disp: 180 tablet, Rfl: 1    simvastatin (ZOCOR) 20 mg tablet, Take 1 tablet (20 mg total) by mouth nightly., Disp: 90 tablet, Rfl: 3    timolol (TIMOPTIC) 0.5 % ophthalmic solution, Administer 1 drop into both eyes daily.  , Disp: , Rfl:       BP Readings from Last 3 Encounters:   07/12/24 118/64   05/14/24 130/70   01/10/24 130/70       Recent Lab results:  Lab Results   Component Value Date    CHOL 143 07/06/2024   ,   Lab Results   Component Value Date    HDL 44 07/06/2024   ,   Lab Results   Component Value Date    LDLCALC 88 07/06/2024   ,   Lab Results   Component Value Date    TRIG 55 07/06/2024        Lab Results   Component Value Date    GLUCOSE 117 (H) 07/06/2024   ,   Lab Results   Component Value Date    HGBA1C 7.5 (H) 07/06/2024         Lab Results   Component Value Date    CREATININE 1.2 07/06/2024       Lab Results   Component Value Date    TSH 1.57 08/06/2021           Lab Results   Component Value Date    HGBA1C 7.5 (H) 07/06/2024

## 2025-01-11 ENCOUNTER — TRANSCRIBE ORDERS (OUTPATIENT)
Dept: LAB | Facility: HOSPITAL | Age: 55
End: 2025-01-11

## 2025-01-11 ENCOUNTER — APPOINTMENT (OUTPATIENT)
Dept: LAB | Facility: HOSPITAL | Age: 55
End: 2025-01-11
Attending: INTERNAL MEDICINE
Payer: COMMERCIAL

## 2025-01-11 DIAGNOSIS — E11.9 TYPE 2 DIABETES MELLITUS WITHOUT COMPLICATIONS (CMS/HCC): ICD-10-CM

## 2025-01-11 DIAGNOSIS — E11.9 TYPE 2 DIABETES MELLITUS WITHOUT COMPLICATIONS (CMS/HCC): Primary | ICD-10-CM

## 2025-01-11 LAB
ALBUMIN SERPL-MCNC: 4.3 G/DL (ref 3.5–5.7)
ALP SERPL-CCNC: 53 IU/L (ref 34–125)
ALT SERPL-CCNC: 16 IU/L (ref 7–52)
ANION GAP SERPL CALC-SCNC: 8 MEQ/L (ref 3–15)
AST SERPL-CCNC: 15 IU/L (ref 13–39)
BASOPHILS # BLD: 0.02 K/UL (ref 0.01–0.1)
BASOPHILS NFR BLD: 0.4 %
BILIRUB SERPL-MCNC: 0.3 MG/DL (ref 0.3–1.2)
BUN SERPL-MCNC: 14 MG/DL (ref 7–25)
CALCIUM SERPL-MCNC: 9.2 MG/DL (ref 8.6–10.3)
CHLORIDE SERPL-SCNC: 106 MEQ/L (ref 98–107)
CHOLEST SERPL-MCNC: 127 MG/DL
CO2 SERPL-SCNC: 25 MEQ/L (ref 21–31)
CREAT SERPL-MCNC: 1.1 MG/DL (ref 0.7–1.3)
DIFFERENTIAL METHOD BLD: ABNORMAL
EGFRCR SERPLBLD CKD-EPI 2021: >60 ML/MIN/1.73M*2
EOSINOPHIL # BLD: 0.2 K/UL (ref 0.04–0.54)
EOSINOPHIL NFR BLD: 4 %
ERYTHROCYTE [DISTWIDTH] IN BLOOD BY AUTOMATED COUNT: 14.3 % (ref 11.6–14.4)
EST. AVERAGE GLUCOSE BLD GHB EST-MCNC: 174 MG/DL
GLUCOSE SERPL-MCNC: 154 MG/DL (ref 70–99)
HBA1C MFR BLD: 7.7 %
HCT VFR BLD AUTO: 40.8 % (ref 40.1–51)
HDLC SERPL-MCNC: 46 MG/DL
HDLC SERPL: 2.8 {RATIO}
HGB BLD-MCNC: 12.4 G/DL (ref 13.7–17.5)
IMM GRANULOCYTES # BLD AUTO: 0.02 K/UL (ref 0–0.08)
IMM GRANULOCYTES NFR BLD AUTO: 0.4 %
LDLC SERPL CALC-MCNC: 70 MG/DL
LYMPHOCYTES # BLD: 1.38 K/UL (ref 1.2–3.5)
LYMPHOCYTES NFR BLD: 27.3 %
MCH RBC QN AUTO: 26.7 PG (ref 28–33.2)
MCHC RBC AUTO-ENTMCNC: 30.4 G/DL (ref 32.2–36.5)
MCV RBC AUTO: 87.7 FL (ref 83–98)
MONOCYTES # BLD: 0.44 K/UL (ref 0.3–1)
MONOCYTES NFR BLD: 8.7 %
NEUTROPHILS # BLD: 3 K/UL (ref 1.7–7)
NEUTS SEG NFR BLD: 59.2 %
NONHDLC SERPL-MCNC: 81 MG/DL
NRBC BLD-RTO: 0 %
PLATELET # BLD AUTO: 212 K/UL (ref 150–350)
PMV BLD AUTO: 10.9 FL (ref 9.4–12.4)
POTASSIUM SERPL-SCNC: 4.5 MEQ/L (ref 3.5–5.1)
PROT SERPL-MCNC: 6.5 G/DL (ref 6–8.2)
RBC # BLD AUTO: 4.65 M/UL (ref 4.5–5.8)
SODIUM SERPL-SCNC: 139 MEQ/L (ref 136–145)
TRIGL SERPL-MCNC: 54 MG/DL
WBC # BLD AUTO: 5.06 K/UL (ref 3.8–10.5)

## 2025-01-11 PROCEDURE — 36415 COLL VENOUS BLD VENIPUNCTURE: CPT

## 2025-01-11 PROCEDURE — 85025 COMPLETE CBC W/AUTO DIFF WBC: CPT

## 2025-01-11 PROCEDURE — 80061 LIPID PANEL: CPT

## 2025-01-11 PROCEDURE — 83036 HEMOGLOBIN GLYCOSYLATED A1C: CPT

## 2025-01-11 PROCEDURE — 80053 COMPREHEN METABOLIC PANEL: CPT

## 2025-01-13 ENCOUNTER — OFFICE VISIT (OUTPATIENT)
Dept: FAMILY MEDICINE | Facility: CLINIC | Age: 55
End: 2025-01-13
Payer: COMMERCIAL

## 2025-01-13 VITALS
OXYGEN SATURATION: 99 % | WEIGHT: 144.8 LBS | HEART RATE: 69 BPM | RESPIRATION RATE: 16 BRPM | HEIGHT: 70 IN | SYSTOLIC BLOOD PRESSURE: 116 MMHG | DIASTOLIC BLOOD PRESSURE: 68 MMHG | BODY MASS INDEX: 20.73 KG/M2 | TEMPERATURE: 97.6 F

## 2025-01-13 DIAGNOSIS — E78.5 DYSLIPIDEMIA: ICD-10-CM

## 2025-01-13 DIAGNOSIS — Z12.5 SCREENING FOR MALIGNANT NEOPLASM OF PROSTATE: ICD-10-CM

## 2025-01-13 DIAGNOSIS — I10 ESSENTIAL HYPERTENSION: ICD-10-CM

## 2025-01-13 DIAGNOSIS — E11.9 TYPE 2 DIABETES MELLITUS WITHOUT COMPLICATION, WITHOUT LONG-TERM CURRENT USE OF INSULIN (CMS/HCC): Primary | ICD-10-CM

## 2025-01-13 PROBLEM — R59.1 LYMPHADENOPATHY: Status: RESOLVED | Noted: 2024-05-14 | Resolved: 2025-01-13

## 2025-01-13 PROBLEM — G93.40 ENCEPHALOPATHY: Status: RESOLVED | Noted: 2021-08-06 | Resolved: 2025-01-13

## 2025-01-13 PROCEDURE — 3074F SYST BP LT 130 MM HG: CPT | Performed by: INTERNAL MEDICINE

## 2025-01-13 PROCEDURE — 99214 OFFICE O/P EST MOD 30 MIN: CPT | Performed by: INTERNAL MEDICINE

## 2025-01-13 PROCEDURE — 3078F DIAST BP <80 MM HG: CPT | Performed by: INTERNAL MEDICINE

## 2025-01-13 PROCEDURE — 3008F BODY MASS INDEX DOCD: CPT | Performed by: INTERNAL MEDICINE

## 2025-01-13 RX ORDER — GLIPIZIDE 5 MG/1
5 TABLET, FILM COATED, EXTENDED RELEASE ORAL
Qty: 90 TABLET | Refills: 1 | Status: SHIPPED | OUTPATIENT
Start: 2025-01-13

## 2025-01-13 ASSESSMENT — ENCOUNTER SYMPTOMS
ENDOCRINE NEGATIVE: 1
RESPIRATORY NEGATIVE: 1
EYES NEGATIVE: 1
NEUROLOGICAL NEGATIVE: 1
PSYCHIATRIC NEGATIVE: 1
HEMATOLOGIC/LYMPHATIC NEGATIVE: 1
MUSCULOSKELETAL NEGATIVE: 1
CARDIOVASCULAR NEGATIVE: 1
GASTROINTESTINAL NEGATIVE: 1
CONSTITUTIONAL NEGATIVE: 1
ALLERGIC/IMMUNOLOGIC NEGATIVE: 1

## 2025-01-13 ASSESSMENT — PATIENT HEALTH QUESTIONNAIRE - PHQ9: SUM OF ALL RESPONSES TO PHQ9 QUESTIONS 1 & 2: 0

## 2025-01-13 NOTE — PROGRESS NOTES
"Stephane Kirby is a 54 y.o. male     55 y/o male presents for follow up     HTN - no headaches, dizziness, lightheadedness, CP  Checking at home   Tolerating meds without side effects     Reviewed labs   Lab Results       Component                Value               Date                       HGBA1C                   7.7 (H)             01/11/2025            Eye exam this past Wednesday   No numbness paresthesias     Lab Results       Component                Value               Date                       CHOL                     127                 01/11/2025            Lab Results       Component                Value               Date                       HDL                      46                  01/11/2025            Lab Results       Component                Value               Date                       LDLCALC                  70                  01/11/2025            Lab Results       Component                Value               Date                       TRIG                     54                  01/11/2025              No results found for: \"CHOLHDL\"           Past Medical History:   Diagnosis Date    Glaucoma     Lipid disorder     Type 2 diabetes mellitus (CMS/HCC)        Past Surgical History   Procedure Laterality Date    Eye surgery      Glaucoma surgery Bilateral     MT COLONOSCOPY FLX DX W/COLLJ SPEC WHEN PFRMD [55671 (CPT®)] N/A 8/10/2021    [place]formed [place] [facility]y Wolfe [place] [facility]        Social History     Socioeconomic History    Marital status:      Spouse name: None    Number of children: None    Years of education: None    Highest education level: None   Tobacco Use    Smoking status: Former    Smokeless tobacco: Never   Vaping Use    Vaping status: Never Used   Substance and Sexual Activity    Alcohol use: Not Currently     Comment: rarely    Drug use: Not Currently    Sexual activity: Defer   Social History Narrative    Septa       Social Drivers of Health     Food " Insecurity: No Food Insecurity (8/6/2021)    Hunger Vital Sign     Worried About Running Out of Food in the Last Year: Never true     Ran Out of Food in the Last Year: Never true       Family History   Problem Relation Name Age of Onset    Hypertension Biological Mother      Diabetes Biological Mother      Hyperlipidemia Biological Father      Heart attack Biological Father         Patient has no known allergies.    Current Outpatient Medications   Medication Sig Dispense Refill    empagliflozin (JARDIANCE) 25 mg tablet Take 1 tablet (25 mg total) by mouth daily. 90 tablet 3    FREESTYLE JAKUB 14 DAY SENSOR kit USE AS DIRECTED 2 kit 3    glipiZIDE (GLUCOTROL XL) 2.5 mg 24 hr tablet Take 1 tablet (2.5 mg total) by mouth daily with breakfast. 90 tablet 3    Lactobacillus acidophilus (PROBIOTIC) 10 billion cell capsule Take 2 capsules by mouth daily.      latanoprost (XALATAN) 0.005 % ophthalmic solution Administer 1 drop into both eyes nightly.      metFORMIN (GLUCOPHAGE) 1,000 mg tablet TAKE 1 TABLET BY MOUTH TWICE A  tablet 1    simvastatin (ZOCOR) 20 mg tablet Take 1 tablet (20 mg total) by mouth See admin instr. At Night 90 tablet 3    timolol (TIMOPTIC) 0.5 % ophthalmic solution Administer 1 drop into both eyes daily.        cyanocobalamin (VITAMIN B12) 1,000 mcg tablet Take 1 tablet (1,000 mcg total) by mouth daily. 90 tablet 3     No current facility-administered medications for this visit.       Review of Systems   Constitutional: Negative.    HENT: Negative.     Eyes: Negative.    Respiratory: Negative.     Cardiovascular: Negative.    Gastrointestinal: Negative.    Endocrine: Negative.    Genitourinary: Negative.    Musculoskeletal: Negative.    Skin: Negative.    Allergic/Immunologic: Negative.    Neurological: Negative.    Hematological: Negative.    Psychiatric/Behavioral: Negative.            Vitals:    01/13/25 0838   BP: 116/68   Pulse: 69   Resp: 16   Temp: 36.4 °C (97.6 °F)   SpO2: 99%        Body mass index is 20.78 kg/m².      Physical Exam  Constitutional:       Appearance: Normal appearance.   HENT:      Right Ear: Tympanic membrane, ear canal and external ear normal.      Left Ear: Tympanic membrane, ear canal and external ear normal.   Eyes:      Extraocular Movements: Extraocular movements intact.      Conjunctiva/sclera: Conjunctivae normal.      Pupils: Pupils are equal, round, and reactive to light.      Comments: anicteric no pallor         Cardiovascular:      Rate and Rhythm: Normal rate and regular rhythm.      Pulses: Normal pulses.      Heart sounds: Normal heart sounds. No murmur heard.  Pulmonary:      Effort: Pulmonary effort is normal.      Breath sounds: Normal breath sounds. No wheezing or rhonchi.   Abdominal:      General: Abdomen is flat. Bowel sounds are normal.      Palpations: Abdomen is soft. There is no mass.      Tenderness: There is no abdominal tenderness.   Musculoskeletal:      Right lower leg: No edema.      Left lower leg: No edema.   Neurological:      General: No focal deficit present.      Mental Status: He is alert and oriented to person, place, and time.   Psychiatric:         Mood and Affect: Mood normal.         Behavior: Behavior normal.          Assessment/Plan      Diagnoses and all orders for this visit:    Type 2 diabetes mellitus without complication, without long-term current use of insulin (CMS/[facility]) (Primary)  -     Hemoglobin A1c; Future  -     Comprehensive metabolic panel; Future  -     CBC and differential; Future  -     Lipid panel; Future  -     Microalbumin / creatinine urine ratio; Future  Elevated A1C will increase glipizide to 5 mg  Essential hypertension  At goal continue current meds  Dyslipidemia  LDL at goal on statin  Screening for malignant neoplasm of prostate  -     PSA; Future    Other orders  -     glipiZIDE (GLUCOTROL XL) 5 mg 24 hr tablet; Take 1 tablet (5 mg total) by mouth daily with breakfast.          No orders of the  defined types were placed in this encounter.      There are no discontinued medications.     No orders of the defined types were placed in this encounter.       Olivier Melchor, DO  1/13/2025

## 2025-02-10 DIAGNOSIS — E11.9 TYPE 2 DIABETES MELLITUS WITHOUT COMPLICATION, WITHOUT LONG-TERM CURRENT USE OF INSULIN (CMS/HCC): ICD-10-CM

## 2025-02-10 RX ORDER — METFORMIN HYDROCHLORIDE 1000 MG/1
1000 TABLET ORAL 2 TIMES DAILY
Qty: 180 TABLET | Refills: 3 | Status: SHIPPED | OUTPATIENT
Start: 2025-02-10

## 2025-02-10 NOTE — TELEPHONE ENCOUNTER
Medicine Refill Request    Last Office Visit: 1/13/2025   Last Consult Visit: Visit date not found  Last Telemedicine Visit: Visit date not found    Next Appointment: 7/15/2025      Current Outpatient Medications:     cyanocobalamin (VITAMIN B12) 1,000 mcg tablet, Take 1 tablet (1,000 mcg total) by mouth daily., Disp: 90 tablet, Rfl: 3    empagliflozin (JARDIANCE) 25 mg tablet, Take 1 tablet (25 mg total) by mouth daily., Disp: 90 tablet, Rfl: 3    FREESTYLE JAKUB 14 DAY SENSOR kit, USE AS DIRECTED, Disp: 2 kit, Rfl: 3    glipiZIDE (GLUCOTROL XL) 5 mg 24 hr tablet, Take 1 tablet (5 mg total) by mouth daily with breakfast., Disp: 90 tablet, Rfl: 1    Lactobacillus acidophilus (PROBIOTIC) 10 billion cell capsule, Take 2 capsules by mouth daily., Disp: , Rfl:     latanoprost (XALATAN) 0.005 % ophthalmic solution, Administer 1 drop into both eyes nightly., Disp: , Rfl:     metFORMIN (GLUCOPHAGE) 1,000 mg tablet, TAKE 1 TABLET BY MOUTH TWICE A DAY, Disp: 180 tablet, Rfl: 1    simvastatin (ZOCOR) 20 mg tablet, Take 1 tablet (20 mg total) by mouth See admin instr. At Night, Disp: 90 tablet, Rfl: 3    timolol (TIMOPTIC) 0.5 % ophthalmic solution, Administer 1 drop into both eyes daily.  , Disp: , Rfl:       BP Readings from Last 3 Encounters:   01/13/25 116/68   07/12/24 118/64   05/14/24 130/70       Recent Lab results:  Lab Results   Component Value Date    CHOL 127 01/11/2025   ,   Lab Results   Component Value Date    HDL 46 01/11/2025   ,   Lab Results   Component Value Date    LDLCALC 70 01/11/2025   ,   Lab Results   Component Value Date    TRIG 54 01/11/2025        Lab Results   Component Value Date    GLUCOSE 154 (H) 01/11/2025   ,   Lab Results   Component Value Date    HGBA1C 7.7 (H) 01/11/2025         Lab Results   Component Value Date    CREATININE 1.1 01/11/2025       Lab Results   Component Value Date    TSH 1.57 08/06/2021           Lab Results   Component Value Date    HGBA1C 7.7 (H) 01/11/2025

## 2025-04-08 ENCOUNTER — TELEPHONE (OUTPATIENT)
Dept: FAMILY MEDICINE | Facility: CLINIC | Age: 55
End: 2025-04-08
Payer: COMMERCIAL

## 2025-06-15 DIAGNOSIS — E11.9 TYPE 2 DIABETES MELLITUS WITHOUT COMPLICATION, WITHOUT LONG-TERM CURRENT USE OF INSULIN (CMS/HCC): ICD-10-CM

## 2025-06-16 RX ORDER — FLASH GLUCOSE SENSOR
KIT MISCELLANEOUS SEE ADMIN INSTRUCTIONS
Qty: 2 KIT | Refills: 3 | OUTPATIENT
Start: 2025-06-16

## 2025-06-16 RX ORDER — FLASH GLUCOSE SENSOR
KIT MISCELLANEOUS
Qty: 2 KIT | Refills: 3 | Status: SHIPPED | OUTPATIENT
Start: 2025-06-16

## 2025-06-16 NOTE — TELEPHONE ENCOUNTER
Medicine Refill Request    Last Office Visit: 1/13/2025   Last Consult Visit: Visit date not found  Last Telemedicine Visit: Visit date not found    Next Appointment: 7/25/2025      Current Outpatient Medications:     cyanocobalamin (VITAMIN B12) 1,000 mcg tablet, Take 1 tablet (1,000 mcg total) by mouth daily., Disp: 90 tablet, Rfl: 3    empagliflozin (JARDIANCE) 25 mg tablet, Take 1 tablet (25 mg total) by mouth daily., Disp: 90 tablet, Rfl: 3    FREESTYLE JAKUB 14 DAY SENSOR kit, USE AS DIRECTED, Disp: 2 kit, Rfl: 3    glipiZIDE (GLUCOTROL XL) 5 mg 24 hr tablet, Take 1 tablet (5 mg total) by mouth daily with breakfast., Disp: 90 tablet, Rfl: 1    Lactobacillus acidophilus (PROBIOTIC) 10 billion cell capsule, Take 2 capsules by mouth daily., Disp: , Rfl:     latanoprost (XALATAN) 0.005 % ophthalmic solution, Administer 1 drop into both eyes nightly., Disp: , Rfl:     metFORMIN (GLUCOPHAGE) 1,000 mg tablet, TAKE 1 TABLET BY MOUTH TWICE A DAY, Disp: 180 tablet, Rfl: 3    simvastatin (ZOCOR) 20 mg tablet, Take 1 tablet (20 mg total) by mouth See admin instr. At Night, Disp: 90 tablet, Rfl: 3    timolol (TIMOPTIC) 0.5 % ophthalmic solution, Administer 1 drop into both eyes daily.  , Disp: , Rfl:     BP Readings from Last 3 Encounters:   01/13/25 116/68   07/12/24 118/64   05/14/24 130/70       Recent Lab results:  Lab Results   Component Value Date    CHOL 127 01/11/2025   ,   Lab Results   Component Value Date    HDL 46 01/11/2025   ,   Lab Results   Component Value Date    LDLCALC 70 01/11/2025   ,   Lab Results   Component Value Date    TRIG 54 01/11/2025        Lab Results   Component Value Date    GLUCOSE 154 (H) 01/11/2025   ,   Lab Results   Component Value Date    HGBA1C 7.7 (H) 01/11/2025         Lab Results   Component Value Date    CREATININE 1.1 01/11/2025       Lab Results   Component Value Date    TSH 1.57 08/06/2021           Lab Results   Component Value Date    HGBA1C 7.7 (H) 01/11/2025

## 2025-07-12 ENCOUNTER — APPOINTMENT (OUTPATIENT)
Dept: LAB | Facility: HOSPITAL | Age: 55
End: 2025-07-12
Attending: INTERNAL MEDICINE
Payer: COMMERCIAL

## 2025-07-12 DIAGNOSIS — E11.9 DIABETES MELLITUS (CMS/HCC): ICD-10-CM

## 2025-07-12 DIAGNOSIS — Z12.5 SPECIAL SCREENING FOR MALIGNANT NEOPLASM OF PROSTATE: ICD-10-CM

## 2025-07-12 LAB
ALBUMIN SERPL-MCNC: 4.4 G/DL (ref 3.5–5.7)
ALP SERPL-CCNC: 57 IU/L (ref 34–125)
ALT SERPL-CCNC: 18 IU/L (ref 7–52)
ANION GAP SERPL CALC-SCNC: 8 MEQ/L (ref 3–15)
AST SERPL-CCNC: 17 IU/L (ref 13–39)
BASOPHILS # BLD: 0.01 K/UL (ref 0.01–0.1)
BASOPHILS NFR BLD: 0.2 %
BILIRUB SERPL-MCNC: 0.4 MG/DL (ref 0.3–1.2)
BUN SERPL-MCNC: 12 MG/DL (ref 7–25)
CALCIUM SERPL-MCNC: 9.5 MG/DL (ref 8.6–10.3)
CHLORIDE SERPL-SCNC: 105 MEQ/L (ref 98–107)
CHOLEST SERPL-MCNC: 134 MG/DL
CO2 SERPL-SCNC: 25 MEQ/L (ref 21–31)
CREAT SERPL-MCNC: 1.2 MG/DL (ref 0.7–1.3)
CREAT UR-MCNC: 118.1 MG/DL
DIFFERENTIAL METHOD BLD: ABNORMAL
EGFRCR SERPLBLD CKD-EPI 2021: >60 ML/MIN/1.73M*2
EOSINOPHIL # BLD: 0.12 K/UL (ref 0.04–0.54)
EOSINOPHIL NFR BLD: 2.7 %
ERYTHROCYTE [DISTWIDTH] IN BLOOD BY AUTOMATED COUNT: 14.8 % (ref 11.6–14.4)
EST. AVERAGE GLUCOSE BLD GHB EST-MCNC: 186 MG/DL
GLUCOSE SERPL-MCNC: 129 MG/DL (ref 70–99)
HBA1C MFR BLD: 8.1 %
HCT VFR BLD AUTO: 39.9 % (ref 40.1–51)
HDLC SERPL-MCNC: 46 MG/DL
HDLC SERPL: 2.9 {RATIO}
HGB BLD-MCNC: 11.9 G/DL (ref 13.7–17.5)
IMM GRANULOCYTES # BLD AUTO: 0.01 K/UL (ref 0–0.08)
IMM GRANULOCYTES NFR BLD AUTO: 0.2 %
LDLC SERPL CALC-MCNC: 78 MG/DL
LYMPHOCYTES # BLD: 1.33 K/UL (ref 1.2–3.5)
LYMPHOCYTES NFR BLD: 29.6 %
MCH RBC QN AUTO: 25.8 PG (ref 28–33.2)
MCHC RBC AUTO-ENTMCNC: 29.8 G/DL (ref 32.2–36.5)
MCV RBC AUTO: 86.6 FL (ref 83–98)
MICROALBUMIN UR-MCNC: 5.6 MG/L
MICROALBUMIN/CREAT UR: 4.7 UG/MG
MONOCYTES # BLD: 0.41 K/UL (ref 0.3–1)
MONOCYTES NFR BLD: 9.1 %
NEUTROPHILS # BLD: 2.62 K/UL (ref 1.7–7)
NEUTS SEG NFR BLD: 58.2 %
NONHDLC SERPL-MCNC: 88 MG/DL
NRBC BLD-RTO: 0 %
PLATELET # BLD AUTO: 200 K/UL (ref 150–350)
PMV BLD AUTO: 10.6 FL (ref 9.4–12.4)
POTASSIUM SERPL-SCNC: 4.3 MEQ/L (ref 3.5–5.1)
PROT SERPL-MCNC: 6.7 G/DL (ref 6–8.2)
PSA SERPL-MCNC: 1.73 NG/ML
RBC # BLD AUTO: 4.61 M/UL (ref 4.5–5.8)
SODIUM SERPL-SCNC: 138 MEQ/L (ref 136–145)
TRIGL SERPL-MCNC: 52 MG/DL
WBC # BLD AUTO: 4.5 K/UL (ref 3.8–10.5)

## 2025-07-12 PROCEDURE — 80053 COMPREHEN METABOLIC PANEL: CPT

## 2025-07-12 PROCEDURE — 80061 LIPID PANEL: CPT

## 2025-07-12 PROCEDURE — 36415 COLL VENOUS BLD VENIPUNCTURE: CPT

## 2025-07-12 PROCEDURE — 84153 ASSAY OF PSA TOTAL: CPT

## 2025-07-12 PROCEDURE — 82570 ASSAY OF URINE CREATININE: CPT

## 2025-07-12 PROCEDURE — 85025 COMPLETE CBC W/AUTO DIFF WBC: CPT

## 2025-07-12 PROCEDURE — 83036 HEMOGLOBIN GLYCOSYLATED A1C: CPT

## 2025-07-25 ENCOUNTER — OFFICE VISIT (OUTPATIENT)
Dept: FAMILY MEDICINE | Facility: CLINIC | Age: 55
End: 2025-07-25
Payer: COMMERCIAL

## 2025-07-25 VITALS
WEIGHT: 147 LBS | DIASTOLIC BLOOD PRESSURE: 74 MMHG | TEMPERATURE: 97.2 F | OXYGEN SATURATION: 99 % | HEIGHT: 70 IN | SYSTOLIC BLOOD PRESSURE: 116 MMHG | RESPIRATION RATE: 16 BRPM | HEART RATE: 75 BPM | BODY MASS INDEX: 21.05 KG/M2

## 2025-07-25 DIAGNOSIS — I10 ESSENTIAL HYPERTENSION: ICD-10-CM

## 2025-07-25 DIAGNOSIS — Z00.00 PREVENTATIVE HEALTH CARE: Primary | ICD-10-CM

## 2025-07-25 DIAGNOSIS — E11.9 TYPE 2 DIABETES MELLITUS WITHOUT COMPLICATION, WITHOUT LONG-TERM CURRENT USE OF INSULIN (CMS/HCC): ICD-10-CM

## 2025-07-25 DIAGNOSIS — E78.5 DYSLIPIDEMIA: ICD-10-CM

## 2025-07-25 PROCEDURE — 3074F SYST BP LT 130 MM HG: CPT | Performed by: INTERNAL MEDICINE

## 2025-07-25 PROCEDURE — 3078F DIAST BP <80 MM HG: CPT | Performed by: INTERNAL MEDICINE

## 2025-07-25 PROCEDURE — 99396 PREV VISIT EST AGE 40-64: CPT | Performed by: INTERNAL MEDICINE

## 2025-07-25 PROCEDURE — 3008F BODY MASS INDEX DOCD: CPT | Performed by: INTERNAL MEDICINE

## 2025-07-25 RX ORDER — GLIPIZIDE 10 MG/1
10 TABLET, FILM COATED, EXTENDED RELEASE ORAL
Qty: 90 TABLET | Refills: 1 | Status: SHIPPED | OUTPATIENT
Start: 2025-07-25

## 2025-07-25 ASSESSMENT — ENCOUNTER SYMPTOMS
RESPIRATORY NEGATIVE: 1
EYES NEGATIVE: 1
CONSTITUTIONAL NEGATIVE: 1
HEMATOLOGIC/LYMPHATIC NEGATIVE: 1
ENDOCRINE NEGATIVE: 1
NEUROLOGICAL NEGATIVE: 1
PSYCHIATRIC NEGATIVE: 1
MUSCULOSKELETAL NEGATIVE: 1
ALLERGIC/IMMUNOLOGIC NEGATIVE: 1
CARDIOVASCULAR NEGATIVE: 1
GASTROINTESTINAL NEGATIVE: 1

## 2025-07-25 ASSESSMENT — PATIENT HEALTH QUESTIONNAIRE - PHQ9: SUM OF ALL RESPONSES TO PHQ9 QUESTIONS 1 & 2: 0

## 2025-07-25 ASSESSMENT — PAIN SCALES - GENERAL: PAINLEVEL_OUTOF10: 0-NO PAIN

## 2025-07-25 NOTE — PROGRESS NOTES
"Stephane Kirby is a 55 y.o. male     54 y/o male presents for CPE     Vision - saw ophtho 2 weeks ago - ophtho restiring establishing new ophtho   Dental - q 6 months \" I was there Monday\"   NO CP, SOB   Activity staying active   Memory no concerns   Mood good   Bowel, bladder no issues   Diet \"sweet tooth\"  Sleeping well     Reviewed labs   Lab Results       Component                Value               Date                       HGBA1C                   8.1 (H)             07/12/2025                         Past Medical History:   Diagnosis Date    Encephalopathy 08/06/2021    Glaucoma     Lipid disorder     Type 2 diabetes mellitus (CMS/HCC)        Past Surgical History   Procedure Laterality Date    Eye surgery      Glaucoma surgery Bilateral     AK COLONOSCOPY FLX DX W/COLLJ SPEC WHEN PFRMD [27069 (CPT®)] N/A 8/10/2021    [place]formed [place] Joby Wolfe [place] [facility]        Social History     Socioeconomic History    Marital status:      Spouse name: None    Number of children: None    Years of education: None    Highest education level: None   Tobacco Use    Smoking status: Former    Smokeless tobacco: Never   Vaping Use    Vaping status: Never Used   Substance and Sexual Activity    Alcohol use: Not Currently     Comment: rarely    Drug use: Not Currently    Sexual activity: Defer   Social History Narrative    Septa       Social Drivers of Health     Food Insecurity: No Food Insecurity (8/6/2021)    Hunger Vital Sign     Worried About Running Out of Food in the Last Year: Never true     Ran Out of Food in the Last Year: Never true       Family History   Problem Relation Name Age of Onset    Hypertension Biological Mother      Diabetes Biological Mother      Hyperlipidemia Biological Father      Heart attack Biological Father         Patient has no known allergies.    Current Outpatient Medications   Medication Sig Dispense Refill    cyanocobalamin (VITAMIN B12) 1,000 mcg tablet Take 1 tablet (1,000 " mcg total) by mouth daily. 90 tablet 3    empagliflozin (JARDIANCE) 25 mg tablet Take 1 tablet (25 mg total) by mouth daily. 90 tablet 3    FREESTYLE JAKUB 14 DAY SENSOR kit USE AS DIRECTED 2 kit 3    glipiZIDE (GLUCOTROL XL) 5 mg 24 hr tablet Take 1 tablet (5 mg total) by mouth daily with breakfast. 90 tablet 1    Lactobacillus acidophilus (PROBIOTIC) 10 billion cell capsule Take 2 capsules by mouth daily.      latanoprost (XALATAN) 0.005 % ophthalmic solution Administer 1 drop into both eyes nightly.      metFORMIN (GLUCOPHAGE) 1,000 mg tablet TAKE 1 TABLET BY MOUTH TWICE A  tablet 3    simvastatin (ZOCOR) 20 mg tablet Take 1 tablet (20 mg total) by mouth See admin instr. At Night 90 tablet 3    timolol (TIMOPTIC) 0.5 % ophthalmic solution Administer 1 drop into both eyes daily.         No current facility-administered medications for this visit.       Review of Systems   Constitutional: Negative.    HENT: Negative.     Eyes: Negative.    Respiratory: Negative.     Cardiovascular: Negative.    Gastrointestinal: Negative.    Endocrine: Negative.    Genitourinary: Negative.    Musculoskeletal: Negative.    Skin: Negative.    Allergic/Immunologic: Negative.    Neurological: Negative.    Hematological: Negative.    Psychiatric/Behavioral: Negative.            Vitals:    07/25/25 0856   BP: 116/74   Pulse:    Resp:    Temp:    SpO2:        Body mass index is 21.09 kg/m².      Physical Exam  Constitutional:       Appearance: Normal appearance.   HENT:      Right Ear: Tympanic membrane, ear canal and external ear normal.      Left Ear: Tympanic membrane, ear canal and external ear normal.   Eyes:      Extraocular Movements: Extraocular movements intact.      Conjunctiva/sclera: Conjunctivae normal.      Pupils: Pupils are equal, round, and reactive to light.      Comments: anicteric no pallor         Cardiovascular:      Rate and Rhythm: Normal rate and regular rhythm.      Pulses: Normal pulses.      Heart  "sounds: Normal heart sounds. No murmur heard.  Pulmonary:      Effort: Pulmonary effort is normal.      Breath sounds: Normal breath sounds. No wheezing or rhonchi.   Abdominal:      General: Abdomen is flat. Bowel sounds are normal.      Palpations: Abdomen is soft. There is no mass.      Tenderness: There is no abdominal tenderness.   Musculoskeletal:      Right lower leg: No edema.      Left lower leg: No edema.   Neurological:      General: No focal deficit present.      Mental Status: He is alert and oriented to person, place, and time.   Psychiatric:         Mood and Affect: Mood normal.         Behavior: Behavior normal.          Assessment/Plan      Diagnoses and all orders for this visit:    Preventative health care (Primary)  Declines any vaccinations \" I am not a vaccine person\"  Colonoscopy up-to-date    Type 2 diabetes mellitus without complication, without long-term current use of insulin (CMS/[facility])  -     Hemoglobin A1c; Future  -     Comprehensive metabolic panel; Future  -     CBC and differential; Future  -     Lipid panel; Future  -     Microalbumin / creatinine urine ratio; Future  A1c remains elevated increase Glucotrol  Essential hypertension  At goal   Dyslipidemia  Statin   Other orders  -     glipiZIDE (GLUCOTROL XL) 10 mg 24 hr tablet; Take 1 tablet (10 mg total) by mouth daily with breakfast.          No orders of the defined types were placed in this encounter.      There are no discontinued medications.     No orders of the defined types were placed in this encounter.       Olivier Melchor, DO  7/25/2025        "

## (undated) DEVICE — SOLN IV 0.9% NSS 1000ML